# Patient Record
Sex: FEMALE | Race: WHITE | Employment: OTHER | URBAN - METROPOLITAN AREA
[De-identification: names, ages, dates, MRNs, and addresses within clinical notes are randomized per-mention and may not be internally consistent; named-entity substitution may affect disease eponyms.]

---

## 2017-01-24 ENCOUNTER — OFFICE VISIT (OUTPATIENT)
Dept: FAMILY MEDICINE CLINIC | Age: 47
End: 2017-01-24

## 2017-01-24 VITALS
HEART RATE: 63 BPM | BODY MASS INDEX: 24.76 KG/M2 | SYSTOLIC BLOOD PRESSURE: 134 MMHG | DIASTOLIC BLOOD PRESSURE: 81 MMHG | WEIGHT: 145.38 LBS | OXYGEN SATURATION: 100 %

## 2017-01-24 DIAGNOSIS — G43.109 MIGRAINE WITH AURA AND WITHOUT STATUS MIGRAINOSUS, NOT INTRACTABLE: Primary | ICD-10-CM

## 2017-01-24 DIAGNOSIS — R07.81 RIB PAIN: ICD-10-CM

## 2017-01-24 DIAGNOSIS — M54.12 CERVICAL RADICULOPATHY, ACUTE: ICD-10-CM

## 2017-01-24 PROCEDURE — G8420 CALC BMI NORM PARAMETERS: HCPCS | Performed by: FAMILY MEDICINE

## 2017-01-24 PROCEDURE — G8484 FLU IMMUNIZE NO ADMIN: HCPCS | Performed by: FAMILY MEDICINE

## 2017-01-24 PROCEDURE — 99214 OFFICE O/P EST MOD 30 MIN: CPT | Performed by: FAMILY MEDICINE

## 2017-01-24 PROCEDURE — G8427 DOCREV CUR MEDS BY ELIG CLIN: HCPCS | Performed by: FAMILY MEDICINE

## 2017-01-24 PROCEDURE — 1036F TOBACCO NON-USER: CPT | Performed by: FAMILY MEDICINE

## 2017-01-24 RX ORDER — OXYCODONE HYDROCHLORIDE AND ACETAMINOPHEN 5; 325 MG/1; MG/1
1 TABLET ORAL EVERY 4 HOURS PRN
COMMUNITY
End: 2017-01-24 | Stop reason: CLARIF

## 2017-01-24 RX ORDER — OXYCODONE AND ACETAMINOPHEN 7.5; 325 MG/1; MG/1
1 TABLET ORAL EVERY 4 HOURS PRN
COMMUNITY
End: 2018-01-10

## 2017-01-24 RX ORDER — FLUCONAZOLE 150 MG/1
150 TABLET ORAL ONCE
Qty: 1 TABLET | Refills: 1 | Status: SHIPPED | OUTPATIENT
Start: 2017-01-24 | End: 2017-01-24

## 2017-01-24 RX ORDER — PREDNISONE 10 MG/1
TABLET ORAL
Qty: 32 TABLET | Refills: 0 | Status: SHIPPED | OUTPATIENT
Start: 2017-01-24 | End: 2017-02-03

## 2017-01-24 RX ORDER — GABAPENTIN 600 MG/1
600 TABLET ORAL 3 TIMES DAILY
Qty: 90 TABLET | Refills: 5 | Status: SHIPPED | OUTPATIENT
Start: 2017-01-24 | End: 2017-07-24 | Stop reason: ALTCHOICE

## 2017-01-24 RX ORDER — RIZATRIPTAN BENZOATE 10 MG/1
TABLET ORAL
Qty: 12 TABLET | Refills: 3 | Status: SHIPPED | OUTPATIENT
Start: 2017-01-24 | End: 2017-05-31 | Stop reason: SDUPTHER

## 2017-02-02 RX ORDER — NICOTINE 21 MG/24HR
PATCH, TRANSDERMAL 24 HOURS TRANSDERMAL
Qty: 30 PATCH | Refills: 0 | Status: SHIPPED | OUTPATIENT
Start: 2017-02-02 | End: 2017-07-24 | Stop reason: ALTCHOICE

## 2017-02-03 ENCOUNTER — TELEPHONE (OUTPATIENT)
Dept: FAMILY MEDICINE CLINIC | Age: 47
End: 2017-02-03

## 2017-02-03 RX ORDER — CYCLOBENZAPRINE HCL 10 MG
TABLET ORAL
Qty: 30 TABLET | Refills: 0 | Status: SHIPPED | OUTPATIENT
Start: 2017-02-03 | End: 2017-07-24 | Stop reason: ALTCHOICE

## 2017-02-09 ENCOUNTER — TELEPHONE (OUTPATIENT)
Dept: FAMILY MEDICINE CLINIC | Age: 47
End: 2017-02-09

## 2017-02-09 DIAGNOSIS — M54.12 CERVICAL RADICULOPATHY, ACUTE: Primary | ICD-10-CM

## 2017-02-19 DIAGNOSIS — G43.109 MIGRAINE WITH AURA AND WITHOUT STATUS MIGRAINOSUS, NOT INTRACTABLE: ICD-10-CM

## 2017-02-20 ENCOUNTER — OFFICE VISIT (OUTPATIENT)
Dept: ORTHOPEDIC SURGERY | Age: 47
End: 2017-02-20

## 2017-02-20 VITALS
HEART RATE: 62 BPM | DIASTOLIC BLOOD PRESSURE: 77 MMHG | HEIGHT: 64 IN | SYSTOLIC BLOOD PRESSURE: 122 MMHG | BODY MASS INDEX: 24.8 KG/M2 | WEIGHT: 145.28 LBS

## 2017-02-20 DIAGNOSIS — M54.2 NECK PAIN: Primary | ICD-10-CM

## 2017-02-20 PROCEDURE — G8484 FLU IMMUNIZE NO ADMIN: HCPCS | Performed by: PHYSICIAN ASSISTANT

## 2017-02-20 PROCEDURE — 99214 OFFICE O/P EST MOD 30 MIN: CPT | Performed by: PHYSICIAN ASSISTANT

## 2017-02-20 PROCEDURE — 72050 X-RAY EXAM NECK SPINE 4/5VWS: CPT | Performed by: PHYSICIAN ASSISTANT

## 2017-02-20 PROCEDURE — G8427 DOCREV CUR MEDS BY ELIG CLIN: HCPCS | Performed by: PHYSICIAN ASSISTANT

## 2017-02-20 PROCEDURE — G8420 CALC BMI NORM PARAMETERS: HCPCS | Performed by: PHYSICIAN ASSISTANT

## 2017-02-20 PROCEDURE — 1036F TOBACCO NON-USER: CPT | Performed by: PHYSICIAN ASSISTANT

## 2017-02-20 RX ORDER — PROPRANOLOL HCL 60 MG
CAPSULE, EXTENDED RELEASE 24HR ORAL
Qty: 30 CAPSULE | Refills: 5 | Status: SHIPPED | OUTPATIENT
Start: 2017-02-20 | End: 2017-08-21 | Stop reason: SDUPTHER

## 2017-02-20 RX ORDER — PREGABALIN 75 MG/1
CAPSULE ORAL
Qty: 28 CAPSULE | Refills: 0 | Status: SHIPPED | OUTPATIENT
Start: 2017-02-20 | End: 2017-07-24 | Stop reason: SDUPTHER

## 2017-02-20 RX ORDER — PREGABALIN 75 MG/1
CAPSULE ORAL
Qty: 60 CAPSULE | Refills: 2 | Status: SHIPPED | OUTPATIENT
Start: 2017-02-20 | End: 2018-02-05 | Stop reason: SDUPTHER

## 2017-04-10 RX ORDER — ELETRIPTAN HYDROBROMIDE 40 MG/1
TABLET, FILM COATED ORAL
Qty: 6 TABLET | Refills: 5 | Status: SHIPPED | OUTPATIENT
Start: 2017-04-10 | End: 2018-01-24 | Stop reason: SDUPTHER

## 2017-05-31 DIAGNOSIS — G43.109 MIGRAINE WITH AURA AND WITHOUT STATUS MIGRAINOSUS, NOT INTRACTABLE: ICD-10-CM

## 2017-06-01 RX ORDER — RIZATRIPTAN BENZOATE 10 MG/1
TABLET ORAL
Qty: 12 TABLET | Refills: 3 | Status: SHIPPED | OUTPATIENT
Start: 2017-06-01 | End: 2017-09-30 | Stop reason: SDUPTHER

## 2017-06-20 ENCOUNTER — TELEPHONE (OUTPATIENT)
Dept: FAMILY MEDICINE CLINIC | Age: 47
End: 2017-06-20

## 2017-07-24 ENCOUNTER — OFFICE VISIT (OUTPATIENT)
Dept: FAMILY MEDICINE CLINIC | Age: 47
End: 2017-07-24

## 2017-07-24 VITALS
OXYGEN SATURATION: 98 % | WEIGHT: 151.25 LBS | BODY MASS INDEX: 25.82 KG/M2 | SYSTOLIC BLOOD PRESSURE: 124 MMHG | DIASTOLIC BLOOD PRESSURE: 70 MMHG | HEART RATE: 52 BPM

## 2017-07-24 DIAGNOSIS — M54.2 CHRONIC NECK PAIN: ICD-10-CM

## 2017-07-24 DIAGNOSIS — G89.29 CHRONIC NECK PAIN: ICD-10-CM

## 2017-07-24 DIAGNOSIS — N95.1 PERI-MENOPAUSE: ICD-10-CM

## 2017-07-24 DIAGNOSIS — G43.109 MIGRAINE WITH AURA AND WITHOUT STATUS MIGRAINOSUS, NOT INTRACTABLE: Primary | ICD-10-CM

## 2017-07-24 PROCEDURE — G8427 DOCREV CUR MEDS BY ELIG CLIN: HCPCS | Performed by: FAMILY MEDICINE

## 2017-07-24 PROCEDURE — 99214 OFFICE O/P EST MOD 30 MIN: CPT | Performed by: FAMILY MEDICINE

## 2017-07-24 PROCEDURE — 1036F TOBACCO NON-USER: CPT | Performed by: FAMILY MEDICINE

## 2017-07-24 PROCEDURE — G8419 CALC BMI OUT NRM PARAM NOF/U: HCPCS | Performed by: FAMILY MEDICINE

## 2017-07-24 RX ORDER — TOPIRAMATE 25 MG/1
25 TABLET ORAL 2 TIMES DAILY
COMMUNITY
End: 2018-01-12 | Stop reason: ALTCHOICE

## 2017-07-24 RX ORDER — ASPIRIN 325 MG
325 TABLET ORAL DAILY
COMMUNITY
End: 2018-06-03

## 2017-07-24 ASSESSMENT — PATIENT HEALTH QUESTIONNAIRE - PHQ9
1. LITTLE INTEREST OR PLEASURE IN DOING THINGS: 0
SUM OF ALL RESPONSES TO PHQ QUESTIONS 1-9: 0
2. FEELING DOWN, DEPRESSED OR HOPELESS: 0
SUM OF ALL RESPONSES TO PHQ9 QUESTIONS 1 & 2: 0

## 2017-08-21 DIAGNOSIS — G43.109 MIGRAINE WITH AURA AND WITHOUT STATUS MIGRAINOSUS, NOT INTRACTABLE: ICD-10-CM

## 2017-08-21 RX ORDER — PROPRANOLOL HCL 60 MG
CAPSULE, EXTENDED RELEASE 24HR ORAL
Qty: 30 CAPSULE | Refills: 5 | Status: SHIPPED | OUTPATIENT
Start: 2017-08-21 | End: 2018-01-12 | Stop reason: ALTCHOICE

## 2017-09-30 DIAGNOSIS — G43.109 MIGRAINE WITH AURA AND WITHOUT STATUS MIGRAINOSUS, NOT INTRACTABLE: ICD-10-CM

## 2017-10-02 RX ORDER — RIZATRIPTAN BENZOATE 10 MG/1
TABLET ORAL
Qty: 12 TABLET | Refills: 3 | Status: SHIPPED | OUTPATIENT
Start: 2017-10-02 | End: 2018-01-12 | Stop reason: ALTCHOICE

## 2017-11-20 ENCOUNTER — OFFICE VISIT (OUTPATIENT)
Dept: FAMILY MEDICINE CLINIC | Age: 47
End: 2017-11-20

## 2017-11-20 VITALS
OXYGEN SATURATION: 98 % | DIASTOLIC BLOOD PRESSURE: 84 MMHG | SYSTOLIC BLOOD PRESSURE: 150 MMHG | BODY MASS INDEX: 25.1 KG/M2 | HEART RATE: 63 BPM | WEIGHT: 147 LBS

## 2017-11-20 DIAGNOSIS — R10.11 RIGHT UPPER QUADRANT ABDOMINAL PAIN: Primary | ICD-10-CM

## 2017-11-20 LAB
ALBUMIN SERPL-MCNC: 4.7 G/DL (ref 3.4–5)
ALP BLD-CCNC: 73 U/L (ref 40–129)
ALT SERPL-CCNC: 13 U/L (ref 10–40)
AMYLASE: 69 U/L (ref 25–115)
ANION GAP SERPL CALCULATED.3IONS-SCNC: 16 MMOL/L (ref 3–16)
AST SERPL-CCNC: 16 U/L (ref 15–37)
BASOPHILS ABSOLUTE: 0.1 K/UL (ref 0–0.2)
BASOPHILS RELATIVE PERCENT: 1.2 %
BILIRUB SERPL-MCNC: <0.2 MG/DL (ref 0–1)
BILIRUBIN DIRECT: <0.2 MG/DL (ref 0–0.3)
BILIRUBIN, INDIRECT: NORMAL MG/DL (ref 0–1)
BILIRUBIN, POC: NORMAL
BLOOD URINE, POC: NORMAL
BUN BLDV-MCNC: 10 MG/DL (ref 7–20)
CALCIUM SERPL-MCNC: 9.7 MG/DL (ref 8.3–10.6)
CHLORIDE BLD-SCNC: 102 MMOL/L (ref 99–110)
CLARITY, POC: CLEAR
CO2: 23 MMOL/L (ref 21–32)
COLOR, POC: YELLOW
CREAT SERPL-MCNC: 0.8 MG/DL (ref 0.6–1.1)
EOSINOPHILS ABSOLUTE: 0.2 K/UL (ref 0–0.6)
EOSINOPHILS RELATIVE PERCENT: 4 %
GFR AFRICAN AMERICAN: >60
GFR NON-AFRICAN AMERICAN: >60
GLUCOSE BLD-MCNC: 101 MG/DL (ref 70–99)
GLUCOSE URINE, POC: NORMAL
HCT VFR BLD CALC: 39 % (ref 36–48)
HEMOGLOBIN: 13.2 G/DL (ref 12–16)
KETONES, POC: NORMAL
LEUKOCYTE EST, POC: NORMAL
LYMPHOCYTES ABSOLUTE: 2.2 K/UL (ref 1–5.1)
LYMPHOCYTES RELATIVE PERCENT: 48.3 %
MCH RBC QN AUTO: 32.6 PG (ref 26–34)
MCHC RBC AUTO-ENTMCNC: 33.8 G/DL (ref 31–36)
MCV RBC AUTO: 96.6 FL (ref 80–100)
MONOCYTES ABSOLUTE: 0.2 K/UL (ref 0–1.3)
MONOCYTES RELATIVE PERCENT: 4.9 %
NEUTROPHILS ABSOLUTE: 1.9 K/UL (ref 1.7–7.7)
NEUTROPHILS RELATIVE PERCENT: 41.6 %
NITRITE, POC: NORMAL
PDW BLD-RTO: 13.6 % (ref 12.4–15.4)
PH, POC: 7.5
PLATELET # BLD: 246 K/UL (ref 135–450)
PMV BLD AUTO: 8.5 FL (ref 5–10.5)
POTASSIUM SERPL-SCNC: 4 MMOL/L (ref 3.5–5.1)
PROTEIN, POC: NORMAL
RBC # BLD: 4.03 M/UL (ref 4–5.2)
SODIUM BLD-SCNC: 141 MMOL/L (ref 136–145)
SPECIFIC GRAVITY, POC: 1.01
TOTAL PROTEIN: 7.1 G/DL (ref 6.4–8.2)
UROBILINOGEN, POC: 0.2
WBC # BLD: 4.5 K/UL (ref 4–11)

## 2017-11-20 PROCEDURE — 81003 URINALYSIS AUTO W/O SCOPE: CPT | Performed by: FAMILY MEDICINE

## 2017-11-20 PROCEDURE — 1036F TOBACCO NON-USER: CPT | Performed by: FAMILY MEDICINE

## 2017-11-20 PROCEDURE — G8482 FLU IMMUNIZE ORDER/ADMIN: HCPCS | Performed by: FAMILY MEDICINE

## 2017-11-20 PROCEDURE — G8419 CALC BMI OUT NRM PARAM NOF/U: HCPCS | Performed by: FAMILY MEDICINE

## 2017-11-20 PROCEDURE — 99214 OFFICE O/P EST MOD 30 MIN: CPT | Performed by: FAMILY MEDICINE

## 2017-11-20 PROCEDURE — G8427 DOCREV CUR MEDS BY ELIG CLIN: HCPCS | Performed by: FAMILY MEDICINE

## 2017-11-20 PROCEDURE — 36415 COLL VENOUS BLD VENIPUNCTURE: CPT | Performed by: FAMILY MEDICINE

## 2017-11-20 RX ORDER — ZONISAMIDE 100 MG/1
CAPSULE ORAL
Refills: 2 | COMMUNITY
Start: 2017-11-10 | End: 2018-01-12 | Stop reason: ALTCHOICE

## 2017-11-20 RX ORDER — ACETAMINOPHEN 500 MG
500 TABLET ORAL EVERY 6 HOURS PRN
COMMUNITY
End: 2018-01-10

## 2017-11-20 RX ORDER — MEMANTINE HYDROCHLORIDE 5 MG/1
5 TABLET ORAL DAILY
COMMUNITY
End: 2018-01-12 | Stop reason: ALTCHOICE

## 2017-11-20 NOTE — PROGRESS NOTES
Subjective:   She presents for pain on and off in her right side right upper quadrant since September he states one time it seemed to be worse after eating but other times it is not related to eating she has had one episode of vomiting no nausea she states sometimes she is constipated and wonders if it could be from that. No fevers review systems negative for dysuria or hematuria negative for back pain with it positive for constipation        She is allergic to ibuprofen and vicodin [hydrocodone-acetaminophen]. Objective:   BP (!) 150/84 (Site: Left Arm, Position: Sitting, Cuff Size: Large Adult)   Pulse 63   Wt 147 lb (66.7 kg)   SpO2 98%   BMI 25.10 kg/m²   No results found for this visit on 11/20/17. Exam:  Constitutional:  Well developed, well nourished, no acute distress, non-toxic appearance    HENT:  Atraumatic,oropharynx moist,  Neck-  no tenderness, supple   Respiratory:  No respiratory distress, normal breath sounds, no rales, no wheezing   Cardiovascular:  Normal rate, normal rhythm, no murmurs, no gallops, no rubs   GI:  Soft, nondistended, very mild right upper quadrant right flank tenderness to palpation but no guarding or peritoneal signs, no organomegaly, no mass, no rebound, no guarding   Musculoskeletal:  No edema  Integument:  Well hydrated, no rash   Neurologic:  Alert & oriented x 3, no focal deficits noted   Psychiatric:  Speech and behavior appropriate, normal affect and mood   Assessment and Plan:  1. Right upper quadrant abdominal pain  POCT Urinalysis No Micro (Auto)    CBC Auto Differential    Basic Metabolic Panel    Hepatic Function Panel    Amylase    XR Abdomen Kub 1 VW     Urine dip in the office today is negative. Differential diagnosis includes constipation right colon and gallbladder disease and/or duodenal ulcer. Call or return to office prn if these symptoms worsen or fail to improve as anticipated. Avoid tobacco products exposure.  The Healthy Family Handout was given to the patient today.   Maxwell Mcghee M.D.

## 2017-11-21 DIAGNOSIS — R10.11 RIGHT UPPER QUADRANT ABDOMINAL PAIN: ICD-10-CM

## 2017-11-21 DIAGNOSIS — R10.11 RUQ PAIN: Primary | ICD-10-CM

## 2017-12-05 DIAGNOSIS — R10.11 RUQ PAIN: ICD-10-CM

## 2018-01-03 ENCOUNTER — TELEPHONE (OUTPATIENT)
Dept: FAMILY MEDICINE CLINIC | Age: 48
End: 2018-01-03

## 2018-01-03 DIAGNOSIS — K80.20 GALLSTONES: Primary | ICD-10-CM

## 2018-01-10 ENCOUNTER — INITIAL CONSULT (OUTPATIENT)
Dept: SURGERY | Age: 48
End: 2018-01-10

## 2018-01-10 VITALS
HEIGHT: 64 IN | DIASTOLIC BLOOD PRESSURE: 72 MMHG | BODY MASS INDEX: 25.44 KG/M2 | WEIGHT: 149 LBS | SYSTOLIC BLOOD PRESSURE: 110 MMHG

## 2018-01-10 DIAGNOSIS — K80.10 CHRONIC CALCULOUS CHOLECYSTITIS: Primary | ICD-10-CM

## 2018-01-10 PROCEDURE — 99202 OFFICE O/P NEW SF 15 MIN: CPT | Performed by: SURGERY

## 2018-01-15 ENCOUNTER — HOSPITAL ENCOUNTER (OUTPATIENT)
Dept: SURGERY | Age: 48
Discharge: HOME | End: 2018-01-15
Attending: SURGERY | Admitting: SURGERY

## 2018-01-15 VITALS
BODY MASS INDEX: 24.83 KG/M2 | DIASTOLIC BLOOD PRESSURE: 81 MMHG | WEIGHT: 149 LBS | TEMPERATURE: 97.9 F | HEIGHT: 65 IN | HEART RATE: 64 BPM | SYSTOLIC BLOOD PRESSURE: 141 MMHG | RESPIRATION RATE: 15 BRPM | OXYGEN SATURATION: 97 %

## 2018-01-15 DIAGNOSIS — K80.10 CHRONIC CALCULOUS CHOLECYSTITIS: Primary | ICD-10-CM

## 2018-01-15 LAB
ALBUMIN SERPL-MCNC: 4.5 G/DL (ref 3.4–5)
ALP BLD-CCNC: 66 U/L (ref 40–129)
ALT SERPL-CCNC: 25 U/L (ref 10–40)
AST SERPL-CCNC: 19 U/L (ref 15–37)
BILIRUB SERPL-MCNC: <0.2 MG/DL (ref 0–1)
BILIRUBIN DIRECT: <0.2 MG/DL (ref 0–0.3)
BILIRUBIN, INDIRECT: NORMAL MG/DL (ref 0–1)
PREGNANCY, URINE: NEGATIVE
TOTAL PROTEIN: 7.2 G/DL (ref 6.4–8.2)

## 2018-01-15 PROCEDURE — 47562 LAPAROSCOPIC CHOLECYSTECTOMY: CPT | Performed by: SURGERY

## 2018-01-15 RX ORDER — OXYCODONE HYDROCHLORIDE AND ACETAMINOPHEN 5; 325 MG/1; MG/1
1 TABLET ORAL PRN
Status: COMPLETED | OUTPATIENT
Start: 2018-01-15 | End: 2018-01-15

## 2018-01-15 RX ORDER — HYDRALAZINE HYDROCHLORIDE 20 MG/ML
5 INJECTION INTRAMUSCULAR; INTRAVENOUS EVERY 10 MIN PRN
Status: DISCONTINUED | OUTPATIENT
Start: 2018-01-15 | End: 2018-01-16 | Stop reason: HOSPADM

## 2018-01-15 RX ORDER — ONDANSETRON 2 MG/ML
4 INJECTION INTRAMUSCULAR; INTRAVENOUS EVERY 10 MIN PRN
Status: DISCONTINUED | OUTPATIENT
Start: 2018-01-15 | End: 2018-01-16 | Stop reason: HOSPADM

## 2018-01-15 RX ORDER — LABETALOL HYDROCHLORIDE 5 MG/ML
5 INJECTION, SOLUTION INTRAVENOUS EVERY 10 MIN PRN
Status: DISCONTINUED | OUTPATIENT
Start: 2018-01-15 | End: 2018-01-16 | Stop reason: HOSPADM

## 2018-01-15 RX ORDER — HYDROMORPHONE HCL 110MG/55ML
0.5 PATIENT CONTROLLED ANALGESIA SYRINGE INTRAVENOUS EVERY 5 MIN PRN
Status: COMPLETED | OUTPATIENT
Start: 2018-01-15 | End: 2018-01-15

## 2018-01-15 RX ORDER — HEPARIN SODIUM 5000 [USP'U]/ML
5000 INJECTION, SOLUTION INTRAVENOUS; SUBCUTANEOUS EVERY 8 HOURS SCHEDULED
Status: DISCONTINUED | OUTPATIENT
Start: 2018-01-15 | End: 2018-01-16 | Stop reason: HOSPADM

## 2018-01-15 RX ORDER — OXYCODONE HYDROCHLORIDE 5 MG/1
TABLET ORAL
Qty: 40 TABLET | Refills: 0 | Status: SHIPPED | OUTPATIENT
Start: 2018-01-15 | End: 2018-01-29

## 2018-01-15 RX ORDER — OXYCODONE HYDROCHLORIDE AND ACETAMINOPHEN 5; 325 MG/1; MG/1
2 TABLET ORAL PRN
Status: COMPLETED | OUTPATIENT
Start: 2018-01-15 | End: 2018-01-15

## 2018-01-15 RX ORDER — HYDROMORPHONE HCL 110MG/55ML
0.5 PATIENT CONTROLLED ANALGESIA SYRINGE INTRAVENOUS EVERY 5 MIN PRN
Status: DISCONTINUED | OUTPATIENT
Start: 2018-01-15 | End: 2018-01-16 | Stop reason: HOSPADM

## 2018-01-15 RX ORDER — MEPERIDINE HYDROCHLORIDE 25 MG/ML
12.5 INJECTION INTRAMUSCULAR; INTRAVENOUS; SUBCUTANEOUS EVERY 5 MIN PRN
Status: DISCONTINUED | OUTPATIENT
Start: 2018-01-15 | End: 2018-01-16 | Stop reason: HOSPADM

## 2018-01-15 RX ORDER — HYDROMORPHONE HCL 110MG/55ML
0.25 PATIENT CONTROLLED ANALGESIA SYRINGE INTRAVENOUS EVERY 5 MIN PRN
Status: DISCONTINUED | OUTPATIENT
Start: 2018-01-15 | End: 2018-01-16 | Stop reason: HOSPADM

## 2018-01-15 RX ORDER — SODIUM CHLORIDE 0.9 % (FLUSH) 0.9 %
10 SYRINGE (ML) INJECTION PRN
Status: DISCONTINUED | OUTPATIENT
Start: 2018-01-15 | End: 2018-01-16 | Stop reason: HOSPADM

## 2018-01-15 RX ORDER — SODIUM CHLORIDE, SODIUM LACTATE, POTASSIUM CHLORIDE, CALCIUM CHLORIDE 600; 310; 30; 20 MG/100ML; MG/100ML; MG/100ML; MG/100ML
INJECTION, SOLUTION INTRAVENOUS CONTINUOUS
Status: DISCONTINUED | OUTPATIENT
Start: 2018-01-15 | End: 2018-01-16 | Stop reason: HOSPADM

## 2018-01-15 RX ORDER — SODIUM CHLORIDE 0.9 % (FLUSH) 0.9 %
10 SYRINGE (ML) INJECTION EVERY 12 HOURS SCHEDULED
Status: DISCONTINUED | OUTPATIENT
Start: 2018-01-15 | End: 2018-01-16 | Stop reason: HOSPADM

## 2018-01-15 RX ADMIN — OXYCODONE HYDROCHLORIDE AND ACETAMINOPHEN 1 TABLET: 5; 325 TABLET ORAL at 14:02

## 2018-01-15 RX ADMIN — HEPARIN SODIUM 5000 UNITS: 5000 INJECTION, SOLUTION INTRAVENOUS; SUBCUTANEOUS at 10:52

## 2018-01-15 RX ADMIN — Medication 0.5 MG: at 12:51

## 2018-01-15 RX ADMIN — Medication 0.5 MG: at 12:56

## 2018-01-15 RX ADMIN — SODIUM CHLORIDE, SODIUM LACTATE, POTASSIUM CHLORIDE, CALCIUM CHLORIDE: 600; 310; 30; 20 INJECTION, SOLUTION INTRAVENOUS at 10:52

## 2018-01-15 RX ADMIN — Medication 0.5 MG: at 13:14

## 2018-01-15 RX ADMIN — SODIUM CHLORIDE, SODIUM LACTATE, POTASSIUM CHLORIDE, CALCIUM CHLORIDE: 600; 310; 30; 20 INJECTION, SOLUTION INTRAVENOUS at 12:58

## 2018-01-15 RX ADMIN — Medication 0.5 MG: at 13:24

## 2018-01-15 ASSESSMENT — PAIN DESCRIPTION - PAIN TYPE
TYPE: SURGICAL PAIN

## 2018-01-15 ASSESSMENT — PAIN SCALES - GENERAL
PAINLEVEL_OUTOF10: 8
PAINLEVEL_OUTOF10: 8
PAINLEVEL_OUTOF10: 7
PAINLEVEL_OUTOF10: 10
PAINLEVEL_OUTOF10: 10

## 2018-01-15 ASSESSMENT — PAIN DESCRIPTION - LOCATION
LOCATION: ABDOMEN

## 2018-01-15 ASSESSMENT — PAIN DESCRIPTION - DESCRIPTORS: DESCRIPTORS: CONSTANT

## 2018-01-15 ASSESSMENT — PAIN - FUNCTIONAL ASSESSMENT
PAIN_FUNCTIONAL_ASSESSMENT: 0-10
PAIN_FUNCTIONAL_ASSESSMENT: 0-10

## 2018-01-15 NOTE — PLAN OF CARE
Phase II:  1. Patient is identified using name and the date of birth. 2.  The patient is free from signs and symptoms of chemical, electrical, laser, radiation, positioning, or transfer/transport injury. 3.  The patient receives appropriate medication(s), safely administered during the Perioperative period. 4.  The patient has wound/tissue perfusion consistent with or improved from baseline levels established preoperatively. 5.  The patient is at or returning to normothermia at the conclusion of the immediate postoperative period. 6.  The patient's fluid, electrolyte, and acid base balances are consistent with or improved from baseline levels established preoperatively. 7.  The patient's pulmonary function is consistent with or improved from baseline levels established preoperatively. 8.  The patient's cardiovascular status is consistent with or improved from baseline levels established preoperatively. 9.  The patient/caregiver demonstrates knowledge of nutritional management related to the operative or other invasive procedure. 10. The patient/caregiver demonstrates knowledge of medication, pain, and wound management. 11. The patient participates in the rehabilitation process as applicable. 12.  The patient/caregiver participates in decisions affection his or her Perioperative plan of care. 13.  The patient's care is consistent with the individualized Perioperative plan of care. 14.  The patient's right to privacy is maintained. 15. The patient is the recipient of competent and ethical care within legal standards of practice. 16.  The patient's value system, lifestyle, ethnicity, and culture are considered, respected, and incorporated in the Perioperative plan of care and understands special services available. 17.  The patient demonstrates and/or reports adequate pain control throughout the the Perioperative period. 18.   The patient's neurological status is consistent with or improved from
and Phase II process. 23.  Patient pain level is established preoperatively using age appropriate pain scale. 24.  The patient will move to fall risk upon sedation- during and through the recovery phase. Interventions- orient the patient to the environment, especially the location of the bathroom; provide treaded socks/non-skid footwear; demonstrate and teach back use of the nurse's call system; instruct the patient to call for help before getting out of bed; lock all movable equipment before transferring patient; keep bed in lowest position possible.  25.  Other:

## 2018-01-15 NOTE — PROGRESS NOTES
Assessment remains unchanged from arrival. Verbal and written discharge instructions given to patient and family. .Verbalized understanding. Patient getting dressed. Assisted to bathroom. Gait steady; patient voided. States pain is easing 5-6/10 and is tolerable.

## 2018-01-17 NOTE — OP NOTE
from the  gallbladder fossa liver bed. Gallbladder was brought out through the  epigastric incision. I reinspected the right upper quadrant. I copiously  irrigated the area. I suctioned out the irrigant. There was no evident  bleeding, bile leak or complication. I deflated the abdomen and removed  the trocars. The fascia at the epigastric port site was reapproximated  with 0 Vicryl suture. Local anesthetic was infiltrated. A 4-0 Vicryl was  used to reapproximate the skin at all the incisions. Benzoin, Steri-Strip  dressing were placed. DISPOSITION:  The patient tolerated the procedure without any acute  complication. POSTOPERATIVE DIAGNOSIS:  Text.       Tereso Roberson MD    D: 01/16/2018 23:16:11       T: 01/16/2018 23:17:47     [email or web address]  Job#: [id number]     Doc#: [id number]    CC:  Chichi Carney MD

## 2018-01-24 RX ORDER — ELETRIPTAN HYDROBROMIDE 40 MG/1
TABLET, FILM COATED ORAL
Qty: 6 TABLET | Refills: 5 | Status: SHIPPED | OUTPATIENT
Start: 2018-01-24 | End: 2018-06-15 | Stop reason: SDUPTHER

## 2018-01-29 ENCOUNTER — OFFICE VISIT (OUTPATIENT)
Dept: SURGERY | Age: 48
End: 2018-01-29

## 2018-01-29 ENCOUNTER — TELEPHONE (OUTPATIENT)
Dept: FAMILY MEDICINE CLINIC | Age: 48
End: 2018-01-29

## 2018-01-29 VITALS
WEIGHT: 146 LBS | HEIGHT: 65 IN | DIASTOLIC BLOOD PRESSURE: 78 MMHG | BODY MASS INDEX: 24.32 KG/M2 | SYSTOLIC BLOOD PRESSURE: 126 MMHG

## 2018-01-29 DIAGNOSIS — Z09 SURGERY FOLLOW-UP EXAMINATION: Primary | ICD-10-CM

## 2018-01-29 PROCEDURE — 99024 POSTOP FOLLOW-UP VISIT: CPT | Performed by: SURGERY

## 2018-02-05 ENCOUNTER — OFFICE VISIT (OUTPATIENT)
Dept: FAMILY MEDICINE CLINIC | Age: 48
End: 2018-02-05

## 2018-02-05 VITALS
BODY MASS INDEX: 24.66 KG/M2 | SYSTOLIC BLOOD PRESSURE: 128 MMHG | HEIGHT: 65 IN | DIASTOLIC BLOOD PRESSURE: 82 MMHG | WEIGHT: 148 LBS | HEART RATE: 63 BPM | OXYGEN SATURATION: 99 %

## 2018-02-05 DIAGNOSIS — G43.109 MIGRAINE WITH AURA AND WITHOUT STATUS MIGRAINOSUS, NOT INTRACTABLE: ICD-10-CM

## 2018-02-05 DIAGNOSIS — M54.12 CERVICAL RADICULOPATHY, ACUTE: ICD-10-CM

## 2018-02-05 DIAGNOSIS — B35.1 ONYCHOMYCOSIS OF TOENAIL: Primary | ICD-10-CM

## 2018-02-05 PROCEDURE — 99214 OFFICE O/P EST MOD 30 MIN: CPT | Performed by: FAMILY MEDICINE

## 2018-02-05 RX ORDER — TERBINAFINE HYDROCHLORIDE 250 MG/1
250 TABLET ORAL DAILY
Qty: 30 TABLET | Refills: 2 | Status: SHIPPED | OUTPATIENT
Start: 2018-02-05 | End: 2018-03-19

## 2018-02-05 RX ORDER — ZONISAMIDE 100 MG/1
CAPSULE ORAL
COMMUNITY
Start: 2018-01-23 | End: 2018-03-13 | Stop reason: ALTCHOICE

## 2018-02-05 RX ORDER — RIZATRIPTAN BENZOATE 10 MG/1
TABLET ORAL
COMMUNITY
Start: 2018-01-23 | End: 2018-02-05

## 2018-02-05 RX ORDER — PREGABALIN 75 MG/1
CAPSULE ORAL
Qty: 60 CAPSULE | Refills: 2 | Status: SHIPPED | OUTPATIENT
Start: 2018-02-05 | End: 2018-11-10 | Stop reason: SDUPTHER

## 2018-02-05 NOTE — PATIENT INSTRUCTIONS
Please read the healthy family handout that you were given and share it with your family. Please compare this printed medication list with your medications at home to be sure they are the same. If you have any medications that are different please contact us immediately at [phone]. Also review your allergies that we have listed, these may also include medications that you have not been able to tolerate, make sure everything listed is correct. If you have any allergies that are different please contact us immediately at [phone].

## 2018-02-19 DIAGNOSIS — G43.109 MIGRAINE WITH AURA AND WITHOUT STATUS MIGRAINOSUS, NOT INTRACTABLE: ICD-10-CM

## 2018-02-19 RX ORDER — RIZATRIPTAN BENZOATE 10 MG/1
TABLET ORAL
Qty: 12 TABLET | Refills: 3 | Status: SHIPPED | OUTPATIENT
Start: 2018-02-19 | End: 2018-06-03 | Stop reason: ALTCHOICE

## 2018-03-13 ENCOUNTER — OFFICE VISIT (OUTPATIENT)
Dept: FAMILY MEDICINE CLINIC | Age: 48
End: 2018-03-13

## 2018-03-13 VITALS
WEIGHT: 152.25 LBS | TEMPERATURE: 98.3 F | BODY MASS INDEX: 25.34 KG/M2 | OXYGEN SATURATION: 99 % | DIASTOLIC BLOOD PRESSURE: 84 MMHG | SYSTOLIC BLOOD PRESSURE: 122 MMHG | HEART RATE: 76 BPM
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
## 2018-03-13 DIAGNOSIS — M25.512 ACUTE PAIN OF LEFT SHOULDER: ICD-10-CM

## 2018-03-13 DIAGNOSIS — R07.81 RIB PAIN: ICD-10-CM

## 2018-03-13 PROCEDURE — 99213 OFFICE O/P EST LOW 20 MIN: CPT | Performed by: FAMILY MEDICINE

## 2018-03-13 RX ORDER — TRAMADOL HYDROCHLORIDE 50 MG/1
50-100 TABLET ORAL EVERY 6 HOURS PRN
Qty: 30 TABLET | Refills: 0 | Status: SHIPPED | OUTPATIENT
Start: 2018-03-13 | End: 2018-03-13 | Stop reason: SINTOL

## 2018-03-13 RX ORDER — CYCLOBENZAPRINE HCL 10 MG
10 TABLET ORAL NIGHTLY PRN
Qty: 30 TABLET | Refills: 0 | Status: SHIPPED | OUTPATIENT
Start: 2018-03-13 | End: 2018-04-12

## 2018-03-13 NOTE — PATIENT INSTRUCTIONS
Please read the healthy family handout that you were given and share it with your family. Please compare this printed medication list with your medications at home to be sure they are the same. If you have any medications that are different please contact us immediately at [phone]. Also review your allergies that we have listed, these may also include medications that you have not been able to tolerate, make sure everything listed is correct. If you have any allergies that are different please contact us immediately at [phone].

## 2018-03-16 ENCOUNTER — HOSPITAL ENCOUNTER (OUTPATIENT)
Dept: OTHER | Age: 48
Discharge: HOME | End: 2018-03-16
Attending: FAMILY MEDICINE | Admitting: FAMILY MEDICINE

## 2018-03-16 DIAGNOSIS — M25.512 ACUTE PAIN OF LEFT SHOULDER: ICD-10-CM

## 2018-03-16 DIAGNOSIS — R07.81 RIB PAIN: ICD-10-CM

## 2018-05-17 DIAGNOSIS — B35.1 ONYCHOMYCOSIS OF TOENAIL: ICD-10-CM

## 2018-05-17 RX ORDER — TERBINAFINE HYDROCHLORIDE 250 MG/1
250 TABLET ORAL DAILY
Qty: 30 TABLET | Refills: 2 | OUTPATIENT
Start: 2018-05-17 | End: 2018-06-28

## 2018-06-15 RX ORDER — ELETRIPTAN HYDROBROMIDE 40 MG/1
TABLET, FILM COATED ORAL
Qty: 6 TABLET | Refills: 5 | Status: ON HOLD | OUTPATIENT
Start: 2018-06-15 | End: 2022-09-02

## 2018-07-09 DIAGNOSIS — G43.109 MIGRAINE WITH AURA AND WITHOUT STATUS MIGRAINOSUS, NOT INTRACTABLE: ICD-10-CM

## 2018-07-11 RX ORDER — RIZATRIPTAN BENZOATE 10 MG/1
TABLET ORAL
Qty: 12 TABLET | Refills: 3 | Status: SHIPPED | OUTPATIENT
Start: 2018-07-11 | End: 2018-11-11 | Stop reason: SDUPTHER

## 2018-07-11 NOTE — TELEPHONE ENCOUNTER
Patient states she is still having migraines and would like to have maxalt refill and discuss at her appt, Refilled medication per verbal order from MD.

## 2018-08-14 ENCOUNTER — TELEPHONE (OUTPATIENT)
Dept: FAMILY MEDICINE CLINIC | Age: 48
End: 2018-08-14

## 2018-08-21 ENCOUNTER — OFFICE VISIT (OUTPATIENT)
Dept: FAMILY MEDICINE CLINIC | Age: 48
End: 2018-08-21

## 2018-08-21 VITALS
HEART RATE: 72 BPM | SYSTOLIC BLOOD PRESSURE: 130 MMHG | BODY MASS INDEX: 24.83 KG/M2 | DIASTOLIC BLOOD PRESSURE: 80 MMHG | WEIGHT: 149.2 LBS | OXYGEN SATURATION: 98 %

## 2018-08-21 DIAGNOSIS — G89.29 CHRONIC BILATERAL LOW BACK PAIN WITH BILATERAL SCIATICA: Primary | ICD-10-CM

## 2018-08-21 DIAGNOSIS — M54.42 CHRONIC BILATERAL LOW BACK PAIN WITH BILATERAL SCIATICA: Primary | ICD-10-CM

## 2018-08-21 DIAGNOSIS — G43.109 MIGRAINE WITH AURA AND WITHOUT STATUS MIGRAINOSUS, NOT INTRACTABLE: ICD-10-CM

## 2018-08-21 DIAGNOSIS — M54.41 CHRONIC BILATERAL LOW BACK PAIN WITH BILATERAL SCIATICA: Primary | ICD-10-CM

## 2018-08-21 PROCEDURE — G8427 DOCREV CUR MEDS BY ELIG CLIN: HCPCS | Performed by: NURSE PRACTITIONER

## 2018-08-21 PROCEDURE — 99213 OFFICE O/P EST LOW 20 MIN: CPT | Performed by: NURSE PRACTITIONER

## 2018-08-21 PROCEDURE — G8420 CALC BMI NORM PARAMETERS: HCPCS | Performed by: NURSE PRACTITIONER

## 2018-08-21 PROCEDURE — 4004F PT TOBACCO SCREEN RCVD TLK: CPT | Performed by: NURSE PRACTITIONER

## 2018-08-21 RX ORDER — METHOCARBAMOL 500 MG/1
500 TABLET, FILM COATED ORAL 4 TIMES DAILY
Qty: 40 TABLET | Refills: 0 | Status: SHIPPED | OUTPATIENT
Start: 2018-08-21 | End: 2018-08-31

## 2018-08-21 RX ORDER — PREDNISONE 20 MG/1
40 TABLET ORAL DAILY
Qty: 10 TABLET | Refills: 0 | Status: SHIPPED | OUTPATIENT
Start: 2018-08-21 | End: 2018-08-26

## 2018-08-21 ASSESSMENT — PATIENT HEALTH QUESTIONNAIRE - PHQ9
2. FEELING DOWN, DEPRESSED OR HOPELESS: 1
1. LITTLE INTEREST OR PLEASURE IN DOING THINGS: 1
SUM OF ALL RESPONSES TO PHQ QUESTIONS 1-9: 2
SUM OF ALL RESPONSES TO PHQ9 QUESTIONS 1 & 2: 2
SUM OF ALL RESPONSES TO PHQ QUESTIONS 1-9: 2

## 2018-08-21 ASSESSMENT — ENCOUNTER SYMPTOMS
GASTROINTESTINAL NEGATIVE: 1
RESPIRATORY NEGATIVE: 1
BACK PAIN: 1

## 2018-08-21 NOTE — PROGRESS NOTES
FOR MIGRAINE MAY REPEAT IN 1-2 HOURS IF NEEDED 12 tablet 3    pregabalin (LYRICA) 75 MG capsule i po BID. 60 capsule 2       Allergies   Allergen Reactions    Ibuprofen      headache    Vicodin [Hydrocodone-Acetaminophen]     Tramadol Nausea And Vomiting       Social History   Substance Use Topics    Smoking status: Current Every Day Smoker     Packs/day: 1.00     Types: Cigarettes     Last attempt to quit: 9/30/2016    Smokeless tobacco: Never Used    Alcohol use Yes      Comment: bimonthly       Objective:   /80   Pulse 72   Wt 149 lb 3.2 oz (67.7 kg)   SpO2 98%   BMI 24.83 kg/m²     Physical Exam   Constitutional: She is oriented to person, place, and time. She appears well-developed and well-nourished. HENT:   Head: Normocephalic and atraumatic. Eyes: Pupils are equal, round, and reactive to light. Conjunctivae and EOM are normal.   Neck: Normal range of motion. Neck supple. Cardiovascular: Normal rate, regular rhythm and normal heart sounds. Pulmonary/Chest: Effort normal and breath sounds normal. No accessory muscle usage. No respiratory distress. Abdominal: Soft. Bowel sounds are normal.   Musculoskeletal: Normal range of motion. Lumbar back: She exhibits tenderness and pain. She exhibits no bony tenderness. Neurological: She is alert and oriented to person, place, and time. Skin: Skin is warm and dry. Psychiatric: She has a normal mood and affect. Her speech is normal and behavior is normal.   Vitals reviewed. Assessment/Plan:   1. Chronic bilateral low back pain with bilateral sciatica  Patient presents today with complaints of low back pain radiating down bilateral buttocks over the past several days. Patient with history of chronic back pain. Patient denies any known injury. On exam noted mild muscular tenderness - lumbar region. I suspect sciatica. Recommend treatment as below and advised on MARTIR.   Patient to follow-up if no better or worsening of

## 2018-08-21 NOTE — PATIENT INSTRUCTIONS
with a small pillow under their head and another under their knees. Some people prefer to lie on their side with a pillow between their knees. Don't stay in one position for too long. Take short walks (10 to 20 minutes) every 2 to 3 hours. Avoid slopes, hills, and stairs until you feel better. Walk only distances you can manage without pain, especially leg pain. How to do the exercises  Back stretches    1. Get down on your hands and knees on the floor. 2. Relax your head and allow it to droop. Round your back up toward the ceiling until you feel a nice stretch in your upper, middle, and lower back. Hold this stretch for as long as it feels comfortable, or about 15 to 30 seconds. 3. Return to the starting position with a flat back while you are on your hands and knees. 4. Let your back sway by pressing your stomach toward the floor. Lift your buttocks toward the ceiling. 5. Hold this position for 15 to 30 seconds. 6. Repeat 2 to 4 times. Follow-up care is a key part of your treatment and safety. Be sure to make and go to all appointments, and call your doctor if you are having problems. It's also a good idea to know your test results and keep a list of the medicines you take. Where can you learn more? Go to [email or web address]://Work[employer]V [email or web address] O. org and sign in to your [employer] account. Enter K089 in the [facility] box to learn more about \"Sciatica: Exercises. \"     If you do not have an account, please click on the \"Sign Up Now\" link. Current as of: November 29, 2017  Content Version: 11.7  © 3482-0605 [employer]. Care instructions adapted under license by [facility] ([facility]). If you have questions about a medical condition or this instruction, always ask your healthcare professional. [place] [address] any warranty or liability for your use of this information.        Patient Education        Oral Corticosteroids: Care Instructions  Your Care Instructions    Oral taking methocarbamol? You should not use methocarbamol if you are allergic to it. To make sure methocarbamol is safe for you, tell your doctor if you have:  · myasthenia gravis; or  · if you also use a narcotic (opioid) medication. Using methocarbamol during early pregnancy may cause birth defects. Tell your doctor if you are pregnant or if you become pregnant while using this medicine. It is not known whether methocarbamol passes into breast milk or if it could affect the nursing baby. Tell your doctor if you are breast-feeding. Methocarbamol is not approved for use by anyone younger than 12years old. How should I take methocarbamol? Follow all directions on your prescription label. Do not take this medicine in larger or smaller amounts or for longer than recommended. You may need to reduce your dose after the first 2 or 3 days of treatment. Carefully follow your doctor's dosing instructions. Methocarbamol is only part of a complete treatment program that may also include rest, physical therapy, or other pain relief measures. Follow your doctor's instructions very closely. This medicine can cause unusual results with certain medical tests. Tell any doctor who treats you that you are using methocarbamol. Store at room temperature away from moisture and heat. What happens if I miss a dose? Take the missed dose as soon as you remember. Skip the missed dose if it is almost time for your next scheduled dose. Do not take extra medicine to make up the missed dose. What happens if I overdose? Seek emergency medical attention or call the Poison Help line at 1-522.130.8157. Overdose symptoms may include extreme drowsiness, fainting, or seizure (convulsions). What should I avoid while taking methocarbamol? This medication may impair your thinking or reactions. Be careful if you drive or do anything that requires you to be alert. Drinking alcohol with this medicine can cause side effects.   What are the information contained herein may be time sensitive. [employer] information has been compiled for use by healthcare practitioners and consumers in the [place] and therefore [employer] does not warrant that uses outside of the [place] are appropriate, unless specifically indicated otherwise. [facility] drug information does not endorse drugs, diagnose patients or recommend therapy. [employer] drug information is an informational resource designed to assist licensed healthcare practitioners in caring for their patients and/or to serve consumers viewing this service as a supplement to, and not a substitute for, the expertise, skill, knowledge and judgment of healthcare practitioners. The absence of a warning for a given drug or drug combination in no way should be construed to indicate that the drug or drug combination is safe, effective or appropriate for any given patient. [facility] does not assume any responsibility for any aspect of healthcare administered with the aid of information [facility] provides. The information contained herein is not intended to cover all possible uses, directions, precautions, warnings, drug interactions, allergic reactions, or adverse effects. If you have questions about the drugs you are taking, check with your doctor, nurse or pharmacist.  Copyright 1758-5298 [address]. Version: 6.01. Revision date: 3/10/2017. Care instructions adapted under license by [facility] ([facility]). If you have questions about a medical condition or this instruction, always ask your healthcare professional. [place] [address] any warranty or liability for your use of this information.

## 2018-08-31 ENCOUNTER — TELEPHONE (OUTPATIENT)
Dept: FAMILY MEDICINE CLINIC | Age: 48
End: 2018-08-31

## 2018-11-10 DIAGNOSIS — M54.12 CERVICAL RADICULOPATHY, ACUTE: ICD-10-CM

## 2018-11-11 DIAGNOSIS — G43.109 MIGRAINE WITH AURA AND WITHOUT STATUS MIGRAINOSUS, NOT INTRACTABLE: ICD-10-CM

## 2018-11-12 RX ORDER — RIZATRIPTAN BENZOATE 10 MG/1
TABLET ORAL
Qty: 12 TABLET | Refills: 3 | Status: SHIPPED | OUTPATIENT
Start: 2018-11-12

## 2019-03-02 DIAGNOSIS — G43.109 MIGRAINE WITH AURA AND WITHOUT STATUS MIGRAINOSUS, NOT INTRACTABLE: ICD-10-CM

## 2019-03-04 RX ORDER — RIZATRIPTAN BENZOATE 10 MG/1
TABLET ORAL
Qty: 12 TABLET | Refills: 3 | OUTPATIENT
Start: 2019-03-04

## 2019-06-21 ENCOUNTER — HOSPITAL ENCOUNTER (EMERGENCY)
Age: 49
Discharge: HOME | End: 2019-06-21
Attending: EMERGENCY MEDICINE
Payer: MEDICARE

## 2019-06-21 ENCOUNTER — APPOINTMENT (OUTPATIENT)
Dept: GENERAL RADIOLOGY | Age: 49
End: 2019-06-21
Payer: MEDICARE

## 2019-06-21 VITALS
HEIGHT: 65 IN | SYSTOLIC BLOOD PRESSURE: 137 MMHG | HEART RATE: 66 BPM | RESPIRATION RATE: 12 BRPM | TEMPERATURE: 98.2 F | BODY MASS INDEX: 26.49 KG/M2 | DIASTOLIC BLOOD PRESSURE: 82 MMHG | WEIGHT: 159 LBS

## 2019-06-21 DIAGNOSIS — J06.9 UPPER RESPIRATORY TRACT INFECTION, UNSPECIFIED TYPE: Primary | ICD-10-CM

## 2019-06-21 PROCEDURE — 6370000000 HC RX 637 (ALT 250 FOR IP)

## 2019-06-21 PROCEDURE — 99283 EMERGENCY DEPT VISIT LOW MDM: CPT

## 2019-06-21 PROCEDURE — 6370000000 HC RX 637 (ALT 250 FOR IP): Performed by: EMERGENCY MEDICINE

## 2019-06-21 PROCEDURE — 71046 X-RAY EXAM CHEST 2 VIEWS: CPT

## 2019-06-21 RX ORDER — BENZONATATE 100 MG/1
100 CAPSULE ORAL ONCE
Status: COMPLETED | OUTPATIENT
Start: 2019-06-21 | End: 2019-06-21

## 2019-06-21 RX ORDER — PREDNISONE 20 MG/1
40 TABLET ORAL ONCE
Status: DISCONTINUED | OUTPATIENT
Start: 2019-06-21 | End: 2019-06-21 | Stop reason: HOSPADM

## 2019-06-21 RX ORDER — PREDNISONE 10 MG/1
TABLET ORAL
Status: COMPLETED
Start: 2019-06-21 | End: 2019-06-21

## 2019-06-21 RX ORDER — METHYLPREDNISOLONE 4 MG/1
TABLET ORAL
Qty: 1 KIT | Refills: 0 | Status: ON HOLD | OUTPATIENT
Start: 2019-06-21 | End: 2022-09-02

## 2019-06-21 RX ORDER — BENZONATATE 100 MG/1
100 CAPSULE ORAL 2 TIMES DAILY PRN
Qty: 14 CAPSULE | Refills: 0 | Status: SHIPPED | OUTPATIENT
Start: 2019-06-21 | End: 2019-06-28

## 2019-06-21 RX ADMIN — BENZONATATE 100 MG: 100 CAPSULE ORAL at 14:39

## 2019-06-21 RX ADMIN — PREDNISONE: 10 TABLET ORAL at 14:40

## 2019-06-21 ASSESSMENT — ENCOUNTER SYMPTOMS
SINUS PAIN: 1
SINUS PRESSURE: 1
NAUSEA: 0
COUGH: 1
SHORTNESS OF BREATH: 0
VOICE CHANGE: 0
TROUBLE SWALLOWING: 0
DIARRHEA: 0
VOMITING: 1

## 2019-06-21 ASSESSMENT — PAIN SCALES - GENERAL: PAINLEVEL_OUTOF10: 3

## 2019-06-21 NOTE — ED NOTES
Patient provided with discharge instructions and any prescriptions. Follow-up reviewed with patient. No further questions verbalized at this time. Vital signs and patient stable upon discharge.         Debi Mccoy, RN  06/21/19 3381

## 2019-06-21 NOTE — ED PROVIDER NOTES
[facility]  eMERGENCY dEPARTMENT eNCOUnter      Pt Name: Marla Norwood  MRN: [id number]  [place] 1970  Date of evaluation: 6/21/2019  Provider: Javi Blackburn MD    CHIEF COMPLAINT       Chief Complaint   Patient presents with    URI     Pt states she had bronchitis about a month ago, finished antibiotics for it about 2 weeks ago and states she feels like it came back; reports persistent cough, sts vomiting after \"coughing fit\". HISTORY OF PRESENT ILLNESS   (Location/Symptom, Timing/Onset, Context/Setting, Quality, Duration, Modifying Factors, Severity)  Note limiting factors. Marla Norwood is a 52 y.o. female who presents for 1 month of on and off bronchitis symptoms. She reports that 2 weeks ago she finished a course of amoxicillin and states she got mildly better but her symptoms never fully resolved and then now they have come back and worsened. She reports her symptoms are nonproductive cough, fatigue, nasal congestion, sinus pressure, fatigue and mellitus. The patient does report she had a coughing episode earlier today that caused posttussive emesis however she denies any nausea, abdominal pain, inability to tolerate p.o., or any vomiting not related to coughing. She reports a mild headache however states that this is \" nothing\" like her previous subarachnoid hemorrhage and denies any limitation of neck movement, syncope, or onset during exertion. She reports her symptoms have been ongoing for 1 month, are waxing and waning but currently worsening. HPI    Nursing Notes were reviewed. REVIEW OFSYSTEMS    (2-9 systems for level 4, 10 or more for level 5)     Review of Systems   Constitutional: Positive for activity change and fatigue. Negative for appetite change and fever. HENT: Positive for congestion, sinus pressure and sinus pain. Negative for trouble swallowing and voice change. Eyes: Negative for visual disturbance.    Respiratory: Positive for cough. Negative for shortness of breath. Cardiovascular: Negative for chest pain and palpitations. Gastrointestinal: Positive for vomiting (posttussive). Negative for diarrhea and nausea. Genitourinary: Negative for dysuria. Musculoskeletal: Negative for gait problem. Neurological: Positive for weakness. Negative for seizures and syncope. Psychiatric/Behavioral: Negative for self-injury and suicidal ideas. Except as noted above the remainder of the review of systems was reviewed and negative.        PAST MEDICAL HISTORY     Past Medical History:   Diagnosis Date    Alcohol abuse 2014    DDD (degenerative disc disease), lumbosacral     Depression     Gastritis     Heart murmur     Heart palpitations     Migraine     Osteoarthritis     Overactive bladder     SCIATIC NERVE DISEASE     Subarachnoid hemorrhage ([facility] [address] 2007         SURGICAL HISTORY       Past Surgical History:   Procedure Laterality Date    BRAIN SURGERY      PT STATED DRILLED HOLE TO RELIEVE BLOOD FROM HEMORRHAGE    CHOLECYSTECTOMY  01/15/2018     LAPAROSCOPIC CHOLECYSTECTOMY              ENDOMETRIAL ABLATION      TONSILLECTOMY      TUBAL LIGATION           CURRENT MEDICATIONS       Previous Medications    BUPROPION HCL (WELLBUTRIN PO)    Take by mouth Daily    ELETRIPTAN (RELPAX) 40 MG TABLET    TAKE 1 TABLET BY MOUTH AS NEEDED MAY REPEAT IN 2 HOURS IF NECESSARY    LYRICA 75 MG CAPSULE    TAKE 1 CAPSULE BY MOUTH TWICE A DAY    OMEGA-3 FATTY ACIDS (FISH OIL PO)    Take by mouth    RIZATRIPTAN (MAXALT) 10 MG TABLET    TAKE 1 TABLET BY MOUTH ONCE AS NEEDED FOR MIGRAINE MAY REPEAT IN 1-2 HOURS IF NEEDED    VITAMIN B-2 (RIBOFLAVIN) 100 MG TABS TABLET    Take 100 mg by mouth daily       ALLERGIES     Ibuprofen; Vicodin [hydrocodone-acetaminophen]; and Tramadol    FAMILY HISTORY       Family History   Problem Relation Age of Onset    Arthritis Mother     Asthma Mother     Depression Mother     Diabetes Mother  Hearing Loss Mother     Heart Disease Mother     High Blood Pressure Mother     High Cholesterol Mother     Mental Illness Mother     Stroke Mother     Vision Loss Mother     Arthritis Father     Hearing Loss Father     High Blood Pressure Father     High Cholesterol Father     Substance Abuse Father     Vision Loss Father     Arthritis Sister     Depression Sister     High Cholesterol Sister     Mental Retardation Sister     [de-identified] / [place] Sister           SOCIAL HISTORY       Social History     Socioeconomic History    Marital status:      Spouse name: None    Number of children: None    Years of education: None    Highest education level: None   Occupational History    None   Social Needs    Financial resource strain: None    Food insecurity:     Worry: None     Inability: None    Transportation needs:     Medical: None     Non-medical: None   Tobacco Use    Smoking status: Current Every Day Smoker     Packs/day: 1.00     Types: Cigarettes     Last attempt to quit: 2016     Years since quittin.7    Smokeless tobacco: Never Used   Substance and Sexual Activity    Alcohol use: Yes     Comment: Occ.     Drug use: No    Sexual activity: None   Lifestyle    Physical activity:     Days per week: None     Minutes per session: None    Stress: None   Relationships    Social connections:     Talks on phone: None     Gets together: None     Attends [employer] service: None     Active member of club or organization: None     Attends meetings of clubs or organizations: None     Relationship status: None    Intimate partner violence:     Fear of current or ex partner: None     Emotionally abused: None     Physically abused: None     Forced sexual activity: None   Other Topics Concern    None   Social History Narrative    None         PHYSICAL EXAM    (up to 7 for level 4, 8 or more for level 5)     ED Triage Vitals [19 1331]   BP Temp Temp Source Pulse Resp SpO2 Height Weight   137/82 98.2 °F (36.8 °C) Oral 66 12 -- 5' 5\" (1.651 m) 159 lb (72.1 kg)       Physical Exam   Constitutional: She is oriented to person, place, and time. She appears well-developed and well-nourished. No distress. HENT:   Head: Normocephalic and atraumatic. Right Ear: Hearing, tympanic membrane, external ear and ear canal normal.   Left Ear: Hearing, tympanic membrane, external ear and ear canal normal.   Mouth/Throat: Oropharynx is clear and moist. No oropharyngeal exudate, posterior oropharyngeal edema, posterior oropharyngeal erythema or tonsillar abscesses. Eyes: Pupils are equal, round, and reactive to light. Conjunctivae and EOM are normal.   Neck: Normal range of motion. Neck supple. No JVD present. Full range of motion of neck. Cardiovascular: Normal rate and intact distal pulses. Pulmonary/Chest: Effort normal and breath sounds normal. No stridor. No respiratory distress. She has no wheezes. Abdominal: There is no tenderness. There is no rebound. Musculoskeletal: She exhibits no deformity. Neurological: She is alert and oriented to person, place, and time. 5/5 strength in all 4 extremities. Normal gait. DIAGNOSTIC RESULTS       RADIOLOGY:     Interpretation per the Radiologist below, if available at the time of this note:    XR CHEST STANDARD (2 VW)   Final Result   No acute cardiopulmonary disease. EMERGENCY DEPARTMENT COURSE and DIFFERENTIAL DIAGNOSIS/MDM:   Vitals:    Vitals:    06/21/19 1331   BP: 137/82   Pulse: 66   Resp: 12   Temp: 98.2 °F (36.8 °C)   TempSrc: Oral   Weight: 159 lb (72.1 kg)   Height: 5' 5\" (1.651 m)         MDM  Patient is afebrile, nontoxic-appearing, and in no acute distress. Chest x-ray does not show any cardiopulmonary pathology such as pneumonia or focal airspace disease.   I suspect likely bronchitis and based on the fact the patient has already been placed on amoxicillin without full resolution of symptoms Cough    METHYLPREDNISOLONE (MEDROL, REX,) 4 MG TABLET    Take by mouth. NYSTATIN (MYCOSTATIN) 448152 UNIT/ML SUSPENSION    Take 5 mLs by mouth 4 times daily for 14 days          (Please note that portions of this note were completed with a voice recognition program.  Efforts were made to edit the dictations but occasionally words aremis-transcribed. )    Nikhil Valdivia MD (electronically signed)  Attending Emergency Physician           Nikhil Valdivia MD  06/21/19 7010

## 2020-02-12 ENCOUNTER — TELEPHONE (OUTPATIENT)
Dept: FAMILY MEDICINE CLINIC | Age: 50
End: 2020-02-12

## 2020-02-12 NOTE — TELEPHONE ENCOUNTER
Pt new to practice and has appointment for March 6 with Dr Domingo Devine. Pt would like to know with being pregnant and 48years old if she should continue taking her medications.  She has had difficult pregnancies in the past  Lyrica  Dicyclomine  Rizatriptan  Percoset 5mg  Mirtazapine

## 2020-02-14 NOTE — TELEPHONE ENCOUNTER
It's ok to give her the message, but if she's not establishing here any future questions need to go to her OB

## 2020-02-14 NOTE — TELEPHONE ENCOUNTER
Pt canceled appt with Jose J Schofield and did not reschedule for a future date. Do I still contact the pt with this information. We've never seen her.

## 2020-08-31 ENCOUNTER — HOSPITAL ENCOUNTER (OUTPATIENT)
Dept: NEUROLOGY | Age: 50
Discharge: HOME | End: 2020-08-31
Payer: MEDICARE

## 2020-08-31 PROCEDURE — 95909 NRV CNDJ TST 5-6 STUDIES: CPT

## 2020-08-31 PROCEDURE — 95886 MUSC TEST DONE W/N TEST COMP: CPT

## 2020-08-31 NOTE — PROCEDURES
Electrodiagnostic Report  [facility]  Physical Medicine & Rehabilitation    08/31/20    Pierce Mcneill  48 y.o.  1970  [id number]  Referring Provider: Carlos Terrell MD    Clinical Problem:  48 y. o with history of difficulties with foot pain after sitting for prolonged time. Onset months ago. PMH: + back issues, no spine surgery no endocrine disease. + fibromyalgia. + spinal injections 3 + yrs ago in back 1 yr ago c spine.  PE: reflexes trace, normal strength    EMG SUMMARY TABLE RIGHT/LEFT LOWER       Spontaneous     MUAP   Recruitment    Insertional Activity Fibrillation Potential Positive Sharp Waves   Fasiculation High Frequency Potentials   Amp Duration PPP Pattern   Vastus Medialis L2-4 Femoral normal none none none none normal normal normal normal   Anterior Tibialis L4,5 Deep Peroneal normal none none none none normal normal normal normal   Gluteus Medius L4-S1 Superior Gut normal none none none none normal normal normal normal   Peroneus Longus L5-S1 Sup Peroneal normal none none none none normal normal normal normal   Posterior Tibialis L5-S1 Tibial normal none none none none normal normal normal normal   Medial Gastroc S1,2 Tibial normal none none none none normal normal normal normal   Extensor Hallicis I4-W0 Peroneal normal none none none none normal normal normal normal   Extensor Dig Brevis L5-S1 Peroneal normal none none none none normal normal normal normal   LS Paraspinals Posterior Rami       normal none none none none normal normal normal normal       Nerve Conduction Studies     Nerve Sensory Distal Latency (msec) Sensory Distal Latency (msec) Amp uv Amp uv Motor Distal Latency (msec) Motor Distal Latency (msec) Amp uv Amp uv Motor NCV (m/sec) Motor NCV (m/sec)    Right Left Right Left Right Left Right Left Right Left   Sural 4.0 (n<4.2) 4.0 (n<4.2) 10 8         Peroneal     4.5 (n<5.5) 5.2 (n<5.5) 3.5  2.0  45 (n>40) 45 (n>40)   Above Knee         (n>40) (n>40)   Tibial Abs  Abs    4.6 (n<6.2) 5.1 (n<6.2) 5.8 8.6 (n>40) (n>40)   H-Reflex                 Summary of EMG and Nerve Conduction Findings: The above EMG needle exam was within normal limits. Nerve conduction studies demonstrate absent medial plantar sensory evoked responses. Remainder within normal limits. Overall Impression: essentially normal study absent medial plantar responses of uncertain significance may be secondary to technical factors, can not exclude mild tarsal tunnel syndrome. Thank you. Electronically signed by:  Yazan Lang DO,8/31/2020,9:02 AM

## 2021-03-24 ENCOUNTER — HOSPITAL ENCOUNTER (OUTPATIENT)
Dept: NON INVASIVE DIAGNOSTICS | Age: 51
Discharge: HOME | End: 2021-03-24
Payer: MEDICARE

## 2021-03-24 DIAGNOSIS — R00.2 PALPITATIONS: ICD-10-CM

## 2021-03-24 PROCEDURE — 93226 XTRNL ECG REC<48 HR SCAN A/R: CPT

## 2021-03-24 PROCEDURE — 93225 XTRNL ECG REC<48 HRS REC: CPT

## 2021-03-24 NOTE — PROGRESS NOTES
Holter placed on patient and patient understands to return monitor in 48 hours. Patient denies questions.

## 2021-04-01 LAB
ACQUISITION DURATION: NORMAL S
AVERAGE HEART RATE: 74 BPM
EKG DIAGNOSIS: NORMAL
HOLTER MAX HEART RATE: 109 BPM
HOOKUP DATE: NORMAL
HOOKUP TIME: NORMAL
MAX HEART RATE TIME/DATE: NORMAL
MIN HEART RATE TIME/DATE: NORMAL
MIN HEART RATE: 54 BPM
NUMBER OF QRS COMPLEXES: NORMAL
NUMBER OF SUPRAVENTRICULAR COUPLETS: 0
NUMBER OF SUPRAVENTRICULAR ECTOPICS: 0
NUMBER OF SUPRAVENTRICULAR ISOLATED BEATS: 0
NUMBER OF VENTRICULAR BIGEMINAL CYCLES: 0
NUMBER OF VENTRICULAR COUPLETS: 0
NUMBER OF VENTRICULAR ECTOPICS: 0

## 2021-04-15 ENCOUNTER — HOSPITAL ENCOUNTER (OUTPATIENT)
Dept: PHYSICAL THERAPY | Age: 51
Setting detail: THERAPIES SERIES
Discharge: HOME | End: 2021-04-15
Payer: MEDICARE

## 2021-04-15 PROCEDURE — 97140 MANUAL THERAPY 1/> REGIONS: CPT

## 2021-04-15 PROCEDURE — 97110 THERAPEUTIC EXERCISES: CPT

## 2021-04-15 PROCEDURE — 97162 PT EVAL MOD COMPLEX 30 MIN: CPT

## 2021-04-15 NOTE — PROGRESS NOTES
[facility], [facility], [address]  Phone: [phone]                                                    Physical Therapy Certification    We had the pleasure of evaluating the following patient for physical therapy services at [address] [place] [address]. A summary of our findings can be found in the initial assessment below. This includes our plan of care. If you have any questions or concerns regarding these findings, please do not hesitate to contact me at the office phone number checked above. Thank you for the referral.       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   [] Yes, Patient intake triggered further evaluation      [] C-SSRS Screening completed  [] PCP notified via Plan of Care  [] Emergency services notified     Pt Occupation/Job Duties:  Disability- enjoys fishing, berry picking, hiking, gardening    Social support/Environment:     Family/caregiver support:   [x]Yes, lives with  and her parents who have dementia and other medical issues.    []No    Health History reviewed with pt:   []Yes   []No      PMH:   subarachnoid brain hemorrhage with surgical decompression, short term memory deficits, depression, anxiety, Lumbar DDD, OA, alcohol abuse 2014, heart murmur, heart palpitations (f/u with MD tomorrow to address heart monitor findings, migraines (received botox injections), overactive bladder, IBS, sciatic nerve disease,  Choely, endometrial ablation, tonsillectomy, tubal ligation, smokes 1-1.5 ppd. SUBJECTIVE FINDINGS        History of Present Illness:   Patient reports insidious onset of L shoulder pain that started 2 months ago. She woke up one day and had pain in her L shoulder and scapular region. Her pain has gotten progressively worse since then. She followed up her PCP who referred her for PT treatment. She is R hand dominant. Has a history of neck pain with L UE radiculopathy. Had an Cervical MRI done in 2016 with results as follows:  Impression   IMPRESSION:   Disc and osteophytes result in narrowing of the left neural foramen at C3-4   and bilateral narrowing of the neural foramina at C6-7 as discussed above. No stenosis of the thecal sac in the cervical region. She had epidural injections done to her neck and low back in the past.  No recent imaging done. Pain       Patient describes pain to be intermittent pain in L shoulder and scapular region, occasionally radiating into L neck and upper arm region   Patient reports pain is  0/10 pain at present and  7/10 pain at its worst.  Worsened by:  Pain increases with any activity, reaching, lifting    Improved by:  Keeping arm at side       Current Functional Limitations:   [x]Yes   []No  Functional Complaints: limited with most activity using L UE, self-care, household chores     PLOF:   [x]No functional limitations   []Pt with previous functional limitations  Pt's sleep is affected?    [x]Yes, not sleeping well due to pain   []No         Patient goal for therapy:  \"get back to normal\"      OBJECTIVE FINDINGS    Posture:   [x]Forward head  [x]Forward flexed trunk   []Pronounced CT junction    [x]Increased thoracic kyphosis   []Increased lumbar lordosis   []Scoliosis   []Swayback  []Decreased WB on   []R   []L   []Scapular winging  []Other:       Range of Motion   [x]Cervical Spine   []Thoracic Spine     Range Tested AROM PROM MMT/Resisted Tests   Flexion 56  5/5   Extension 60  5/5   Sidebending Left 34  5/5   Sidebending Right 30*  5/5   Rotation Left 70  5/5   Rotation Right 77*  5/5   Comments:    UE Range of Motion/Strength Testing     [x]All ROM WNL except as marked below   [x]All strength WNL (5/5) except as marked below (measured out of 5)     Range Tested AROM PROM Resisted Comments   *denotes pain Left Right Left Right Left Right    Shoulder Flexion 115* 180 180  2/5 4/5    Shoulder Extension 60 70   4+/5 5/5    Shoulder Abduction 100* 180 180  2/5 4/5    Shoulder Adduction           Shoulder IR 90 90   4-/5 4+/5    Shoulder ER 80 100   4-/5 4+/5    Elbow Flexion WNL WNL   4-/5 4/5    Elbow Extension  \" \"   4-/5 5/5    Pronation \" \"   5/5 5/5    Supination \" \"   5/5 5/5    Wrist Flexion \" \"   5/5 5/5    Wrist Extension \" \"   5/5 5/5    Radial Deviation \" \"   5/5 5/5    Ulnar Deviation \" \"   5/5 5/5                           Scapular Strength    []All WNL (5/5) except as marked below (measured out of 5)  [x]NT     Reflexes/Cervical Strength    [x]All reflexes WNL (2+) or negative test except as marked below   [x]All strength WNL (5/5) except as marked below     Reflex Left Right Strength Strength   Biceps (C5,6) 1 1 Anterior cervical flexors    Brachioradialis (C6) 2 2 Lateral musculature    Triceps (C7) 2 2     Abductor Digiti Minimi (C8,T1)       Quadriceps (L3,4)       Achilles (S1,2)       Ankle clonus       Meagan's reflex        Babinski's reflex           Dermatomes/Myotomes     [x]All dermatomes WNL for light touch     Flexibility     Muscle Findings   Pectoralis Minor []WNL   []Decreased R   []Decreased L   []NT   Levator Scapula []WNL   [x]Decreased R   [x]Decreased L []NT   Scalenes []WNL   []Decreased R   []Decreased L   []NT   Upper Trapezius  []WNL   [x]Decreased R   [x]Decreased L   []NT   Suboccipitals  []WNL   []Decreased R   []Decreased L   []NT   Other:  []WNL   []Decreased R   []Decreased L   []NT     Special Tests- UE seated     Special Test Findings   Empty can test/supraspinatus []Neg   []Pos R   [x]Pos L   []NT   Drop arm test [x]Neg   []Pos R   []Pos L   []NT   Neer's impingement []Neg   []Pos R   [x]Pos L   []NT   Storm-Cooper []Neg   []Pos R   [x]Pos L   []NT   Elbow varus stress []Neg   []Pos R   []Pos L   []NT   Elbow valgus stress  []Neg   []Pos R   []Pos L   []NT   Tinel's sign  []Neg   []Pos R   []Pos L   []NT  []Elbow   []Wrist    Finkelstein's test []Neg   []Pos R   []Pos L   []NT   Phalen's test  []Neg   []Pos R   []Pos L   []NT   Other []Neg   []Pos R   []Pos L   []NT   Other  []Neg   []Pos R   []Pos L   []NT     Palpation     Patient reported tenderness with palpation:  [x]Yes   []No   []NA  Location:  L T1-5, L T4 rib, infraspinatus, supraspinatus, and proximal biceps tendon  PT notes warmth:  []Yes   [x]No   []NA  Location:   PT notes increased muscle tone:   [x]Yes   []No   []NA  Location:  L UT, levator scap and thoracic paraspinals  PT notes crepitus with palpation:   []Yes   [x]No   []NA  Location:   Ligament tenderness/provocation:   []Yes   []No   [x]NA  Location:    PT notes decreased scar mobility:   []Yes   []No   [x]NA    Co-morbidities/Complexities (which will affect course of rehabilitation):  []None           Arthritic conditions   []Rheumatoid arthritis (M05.9)  [x]Osteoarthritis (M19.91)   Cardiovascular conditions   []Hypertension (I10)  []Hyperlipidemia (E78.5)  []Angina pectoris (I20)  []Atherosclerosis (I70)   Musculoskeletal conditions   [x]Disc pathology   []Congenital spine pathologies   []Prior surgical intervention  []Osteoporosis (M81.8)  []Osteopenia (M85.8)   Endocrine conditions   []Hypothyroid (E03.9)  []Hyperthyroid or hold objects   [x]Reduced ability to throw or toss an object   []other:    Participation Restrictions   [x]Reduced participation in self care activities   [x]Reduced participation in home management activities   []Reduced participation in work activities   [x]Reduced participation in social activities. []Reduced participation in sport/recreation activities. Classification:    []Signs/symptoms consistent with post-surgical status including decreased ROM, strength and function. []Signs/symptoms consistent with joint sprain/strain   [x]Signs/symptoms consistent with shoulder impingement   [x]Signs/symptoms consistent with shoulder/elbow/wrist tendinopathy   []Signs/symptoms consistent with Rotator cuff tear   []Signs/symptoms consistent with labral tear   [x]Signs/symptoms consistent with postural dysfunction    []Signs/symptoms consistent with Glenohumeral IR Deficit - <45 degrees   [x]Signs/symptoms consistent with facet dysfunction of cervical/thoracic spine    []Signs/symptoms consistent with pathology which may benefit from Dry needling     []other:     Rehabilitation Potential:  Good for goals listed below. Strengths for achieving goals include:  [x]Pt motivated   [x]PLOF   [x]Family support   Limitations for achieving goals include:  [x]None   []Severity of condition     []Cognitive   []Lack of family support   []Lack of resources     []Other:         Prognosis:   [x]Good   []Fair   []Poor    GOALS:  Short Term Goals:  2 weeks  1. Independent in HEP and progression per patient tolerance, in order to prevent re-injury. [] Progressing: [] Met: [] Not Met: [] Adjusted   2. Patient will have a decrease in pain to facilitate improvement in movement, function, and ADLs as indicated by Functional Deficits. [] Progressing: [] Met: [] Not Met: [] Adjusted     Long Term Goals:  8 weeks  1. Disability index score of 20% or less for the QuickDash to assist with reaching prior level of function.    []

## 2021-04-15 NOTE — FLOWSHEET NOTE
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)     Treatment Diagnosis: L shoulder weakness, limited ROM, poor posture, poor scap control, thoracic spine and ribcage dysfunction                              Onset Date:  2/1/21      Referral Date:  3/11/21           Referring Physician: Jesse Mendoza MD                                                Visits Allowed/Insurance/Certification Information: Carlito Matthews     Restrictions/Precautions:  none    Plan of care sent to provider:   []NA   [x]Faxed   []Co-signature       Plan of care signed:    [x]NA   []Yes   []No      Progress report will be due (10 Rx or 30 days whichever is less): 0/17/36     Recertification will be due (POC Duration  / 90 days whichever is less): 7/15/21     Visit# / total visits:     Visit # Insurance Allowable Auth Required   In Person 1/16 PRN []  Yes     [x]  No    Tele Health 0  []  Yes     []  No    Total 1/16       Plan for Next Session:  Reassess thoracic/rib alignment, AAROM L shoulder, shoulder isometrics, prone long arm ext, prone row with kickback. PROM and joint mobs to L shoulder      Subjective:  See eval     Pain level:   AT EVAL: Patient describes pain to be intermittent pain in L shoulder and scapular region, occasionally radiating into L neck and upper arm region   Patient reports pain is  0/10 pain at present and  7/10 pain at its worst.      Objective:       Exercises:    Exercises in bold performed in department today. Items not bolded are carried forward from prior visits for continuity of the record.   Exercise/Equipment Resistance/Repetitions Issued for [employer] Axial elongation x10      scap depression and retraction x10      Seated UT stretch 30 sec/ x3      Barrel stretch to R 30 sec/ x3                                                                                                           Therapeutic Exercise/Home Exercise Program:   x30 min. Patient educated on eval findings, plan of care, and goals. Instructed in HEP with handout given. Group Therapy:    0 minutes    Therapeutic Activity:  0 minutes     Gait: 0 minutes    Neuromuscular Re-Education:  0 minutes      Canalith Repositioning Procedure:  0 minutes    Manual Therapy:  15 minutes  Thoracic mob in supine   Neutral alignment achieved with patient reports that she could sit up better with less pain noted in scap region. Modalities: 0 minutes   [] GAME READY (VASO)- for significant edema, swelling, pain control. Functional Outcome Measure:   []NA  Measure Used: Quick dash  Date Assessed:  4/15/21  Score:  37=59%    Assessment/Treatment/Activity Tolerance:    Patients response to treatment:   [x]Patient tolerated treatment well with no adverse reactions noted    []Patient limited by fatigue   []Patient limited by pain    []Patient limited by other medical complications   [x]Other:  Pain localized to L shoulder 2-3/10, no pain in scapular region after treatment    Goals:   Progress towards goals:  Goals established on 4/15/21  GOALS:  Short Term Goals:  2 weeks  1. Independent in HEP and progression per patient tolerance, in order to prevent re-injury. []? Progressing: []? Met: []? Not Met: []? Adjusted            2. Patient will have a decrease in pain to facilitate improvement in movement, function, and ADLs as indicated by Functional Deficits. []? Progressing: []? Met: []? Not Met: []? Adjusted               Long Term Goals:  8 weeks  1. Disability index score of 20% or less for the QuickDash to assist with reaching prior level of function. []? Progressing: []? Met: []? Not Met: []?  Adjusted 2. Patient will demonstrate increased AROM to L shoulder =R shoulder to allow for proper joint functioning as indicated by patients Functional Deficits. []? Progressing: []? Met: []? Not Met: []? Adjusted            3. Patient will demonstrate an increase in strength to 4+/5 or greater throughout R shoulder/UE to allow for proper functional mobility as indicated by patients Functional Deficits. []? Progressing: []? Met: []? Not Met: []? Adjusted            4. Patient will return to all transfers, work activities, and functional activities without increased symptoms or restriction. []? Progressing: []? Met: []? Not Met: []? Adjusted            5. Patient will have 0-2/10 pain with ADL's.  []? Progressing: []? Met: []? Not Met: []? Adjusted            6. Correct dysfunction in thoracic spine/ribcage. []? Progressing: []? Met: []? Not Met: []? Adjusted    Prognosis: [x]Good   []Fair   []Poor    Patient Requires Follow-up:  [x]Yes  []No    Plan: [x]Plan of care initiated     []Continue per plan of care    [] Alter current plan (see comments)    []Hold pending MD visit []Discharge    Charges:  Timed Code Treatment Minutes: 45   Total Treatment Minutes:  60   BWC time for each procedure?:  TE TIME:  NMR TIME:  MANUAL TIME:  UNTIMED MINUTES:       [] EVAL (LOW) 35275 (typically 20 minutes face-to-face)  [x] EVAL (MOD) 86724 (typically 30 minutes face-to-face)  [] EVAL (HIGH) 19675 (typically 45 minutes face-to-face)  [] RE-EVAL     [x] MV(56065) x2     [] IONTO  [] NMR (33239) x     [] VASO  [x] Manual (19811) x1     [] Other:  [] TA x      [] Mech Traction (49318)  [] ES(attended) (52923)     [] ES (un) (54028): Medicare Cap total YTD:   [x]N/A    Electronically signed by:  Ruthy Umanzor PT, MPT [id number]    Note: If patient does not return for scheduled/ recommended follow up visits, this note will serve as a discharge from care along with most recent update on progress.

## 2021-04-20 ENCOUNTER — HOSPITAL ENCOUNTER (OUTPATIENT)
Dept: PHYSICAL THERAPY | Age: 51
Setting detail: THERAPIES SERIES
Discharge: HOME | End: 2021-04-20
Payer: MEDICARE

## 2021-04-23 ENCOUNTER — HOSPITAL ENCOUNTER (OUTPATIENT)
Dept: PHYSICAL THERAPY | Age: 51
Setting detail: THERAPIES SERIES
Discharge: HOME | End: 2021-04-23
Payer: MEDICARE

## 2021-04-27 ENCOUNTER — HOSPITAL ENCOUNTER (OUTPATIENT)
Dept: PHYSICAL THERAPY | Age: 51
Setting detail: THERAPIES SERIES
Discharge: HOME | End: 2021-04-27
Payer: MEDICARE

## 2021-04-27 PROCEDURE — 97140 MANUAL THERAPY 1/> REGIONS: CPT

## 2021-04-27 PROCEDURE — 97110 THERAPEUTIC EXERCISES: CPT

## 2021-04-27 NOTE — FLOWSHEET NOTE
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)     Treatment Diagnosis: L shoulder weakness, limited ROM, poor posture, poor scap control, thoracic spine and ribcage dysfunction                              Onset Date:  2/1/21      Referral Date:  3/11/21           Referring Physician: Arin Crane MD                                                Visits Allowed/Insurance/Certification Information: Álvaro Camp     Restrictions/Precautions:  none    Plan of care sent to provider:   []NA   [x]Faxed   []Co-signature       Plan of care signed:    [x]NA   []Yes   []No      Progress report will be due (10 Rx or 30 days whichever is less): 2/90/71     Recertification will be due (POC Duration  / 90 days whichever is less): 7/15/21     Visit# / total visits:     Visit # Insurance Allowable Auth Required   In Person 2/16 PRN []  Yes     [x]  No    Tele Health 0  []  Yes     []  No    Total 2/16       Plan for Next Session:  Reassess thoracic/rib alignment,  shoulder isometrics or RTC tband exercises, prone long arm ext, prone row with kickback      Subjective:  Patient doing well. She realize she injured her L shoulder closing her bedroom door while turning off the light on the stairwell. She is wondering if is would be ok to babysit her 3year old autistic grandson who is non-verbal and requires a lot of physical effort to change his diaper. She got headaches 15 min after performing the UT stretch. Most of her pain is in the L scapular region, with minimal pain in her L shoulder. She is now able to don/doff bra without difficulty.   Only getting migraines 3-4x/month with almost daily headaches. Pain level:  0/10 currently, and 3/10 at worst since last visit. AT EVAL: Patient describes pain to be intermittent pain in L shoulder and scapular region, occasionally radiating into L neck and upper arm region   Patient reports pain is  0/10 pain at present and  7/10 pain at its worst.      Objective:       Exercises:    Exercises in bold performed in department today. Items not bolded are carried forward from prior visits for continuity of the record. Exercise/Equipment Resistance/Repetitions Issued for HEP     Axial elongation x10      scap depression and retraction x10      Seated UT stretch 30 sec/ x3, modified to avoid headaches, no hand on head,       Barrel stretch to R 30 sec/ x3      AAROM L shoulder  (flex, abd, ER) x5 each direction                                                                                                    Therapeutic Exercise/Home Exercise Program:   x15 min. Reviewed and progressed exercises as noted above with new handout given. Group Therapy:    0 minutes    Therapeutic Activity:  0 minutes     Gait: 0 minutes    Neuromuscular Re-Education:  0 minutes      Canalith Repositioning Procedure:  0 minutes    Manual Therapy:  30 minutes  Thoracic/rib mob in supine, neutral alignment achieved. STM and TPR to B UT, levator scap,SCM, scalenes  Gentle manual cervical traction  B shoulder depression  Manual UT stretch within comfortable range  STM to scalp        Modalities: 0 minutes   [] GAME READY (VASO)- for significant edema, swelling, pain control. Functional Outcome Measure:   []NA  Measure Used: Quick dash  Date Assessed:  4/15/21  Score:  37=59%    Assessment/Treatment/Activity Tolerance:   Instructed patient to let her pain determine her ability to babysit her autistic grandson. If she is having pain with functional tasks, babysitting might not be a good idea, but if pain-free can try.   Patients response to treatment:   [x]Patient tolerated treatment well with no adverse reactions noted    []Patient limited by fatigue   []Patient limited by pain    []Patient limited by other medical complications   [x]Other:  Pain decreased to 0/10 in all areas after treatment. Goals:   Progress towards goals:  Goals established on 4/15/21  GOALS:  Short Term Goals:  2 weeks  1. Independent in HEP and progression per patient tolerance, in order to prevent re-injury. []? Progressing: []? Met: []? Not Met: []? Adjusted            2. Patient will have a decrease in pain to facilitate improvement in movement, function, and ADLs as indicated by Functional Deficits. []? Progressing: []? Met: []? Not Met: []? Adjusted               Long Term Goals:  8 weeks  1. Disability index score of 20% or less for the QuickDash to assist with reaching prior level of function. []? Progressing: []? Met: []? Not Met: []? Adjusted           2. Patient will demonstrate increased AROM to L shoulder =R shoulder to allow for proper joint functioning as indicated by patients Functional Deficits. []? Progressing: []? Met: []? Not Met: []? Adjusted            3. Patient will demonstrate an increase in strength to 4+/5 or greater throughout R shoulder/UE to allow for proper functional mobility as indicated by patients Functional Deficits. []? Progressing: []? Met: []? Not Met: []? Adjusted            4. Patient will return to all transfers, work activities, and functional activities without increased symptoms or restriction. []? Progressing: []? Met: []? Not Met: []? Adjusted            5. Patient will have 0-2/10 pain with ADL's.  []? Progressing: []? Met: []? Not Met: []? Adjusted            6. Correct dysfunction in thoracic spine/ribcage. []? Progressing: []? Met: []? Not Met: []?  Adjusted    Prognosis: [x]Good   []Fair   []Poor    Patient Requires Follow-up:  [x]Yes  []No    Plan: []Plan of care initiated     [x]Continue per plan of care    []

## 2021-04-30 ENCOUNTER — HOSPITAL ENCOUNTER (OUTPATIENT)
Dept: PHYSICAL THERAPY | Age: 51
Setting detail: THERAPIES SERIES
Discharge: HOME | End: 2021-04-30
Payer: MEDICARE

## 2021-04-30 NOTE — FLOWSHEET NOTE
Physical Therapy  Cancellation/No-show Note  Patient Name:  Carmen Lunsford  :  1970   Date:  2021  Cancels to Date: 1  No-shows to Date: 2    For today's appointment patient:  []  Cancelled  []  Rescheduled appointment  [x]  No-show     Reason given by patient:  []  Patient ill  []  Conflicting appointment  []  No transportation    []  Conflict with work  []  No reason given  []  Other:     Comments:      Electronically signed by:  Cole Aldrich PT, MPT [id number]

## 2021-05-04 ENCOUNTER — HOSPITAL ENCOUNTER (OUTPATIENT)
Dept: PHYSICAL THERAPY | Age: 51
Setting detail: THERAPIES SERIES
Discharge: HOME | End: 2021-05-04
Payer: MEDICARE

## 2021-05-04 PROCEDURE — 97035 APP MDLTY 1+ULTRASOUND EA 15: CPT

## 2021-05-04 PROCEDURE — 97110 THERAPEUTIC EXERCISES: CPT

## 2021-05-04 PROCEDURE — 97140 MANUAL THERAPY 1/> REGIONS: CPT

## 2021-05-04 NOTE — FLOWSHEET NOTE
level:  0/10 currently, and 7/10 at worst since last visit. AT EVAL: Patient describes pain to be intermittent pain in L shoulder and scapular region, occasionally radiating into L neck and upper arm region   Patient reports pain is  0/10 pain at present and  7/10 pain at its worst.      Objective:       Exercises:    Exercises in bold performed in department today. Items not bolded are carried forward from prior visits for continuity of the record. Exercise/Equipment Resistance/Repetitions Issued for HEP     Axial elongation x10      scap depression and retraction X10, progressed to wing breathing      Seated UT stretch 30 sec/ x3, modified to avoid headaches, no hand on head,       Barrel stretch to R 30 sec/ x3      AAROM L shoulder  (flex, abd, ER) x5 each direction      Rhythmic stabilization x1  Min with manual resistance      PNF with D2 x10 reps with light resistance      Ceiling punches 2x10      Figure 8 2x10        Prone row 2x10      Prone long arm ext  2x10                                                       Therapeutic Exercise/Home Exercise Program:   x30 min. Reviewed and progressed exercises as noted above with new handout given. Group Therapy:    0 minutes    Therapeutic Activity:  0 minutes     Gait: 0 minutes    Neuromuscular Re-Education:  0 minutes      Canalith Repositioning Procedure:  0 minutes    Manual Therapy:  15 minutes  PROM to L shoulder  Inf and post joint mobs to [facility], grade 3-4         Modalities: 10 minutes   [] GAME READY (VASO)- for significant edema, swelling, pain control.    US to L ant shoulder, 50%, 3mhz     Functional Outcome Measure:   []NA  Measure Used: Quick dash  Date Assessed:  4/15/21  Score:  37=59%    Assessment/Treatment/Activity Tolerance:     Patients response to treatment:   [x]Patient tolerated treatment well with no adverse reactions noted    []Patient limited by fatigue   []Patient limited by pain    []Patient limited by other medical complications   [x]Other:  Pain decreased to 0/10 in all areas after treatment. Goals:   Progress towards goals:  Goals established on 4/15/21  GOALS:  Short Term Goals:  2 weeks  1. Independent in HEP and progression per patient tolerance, in order to prevent re-injury. []? Progressing: []? Met: []? Not Met: []? Adjusted            2. Patient will have a decrease in pain to facilitate improvement in movement, function, and ADLs as indicated by Functional Deficits. []? Progressing: []? Met: []? Not Met: []? Adjusted               Long Term Goals:  8 weeks  1. Disability index score of 20% or less for the QuickDash to assist with reaching prior level of function. []? Progressing: []? Met: []? Not Met: []? Adjusted           2. Patient will demonstrate increased AROM to L shoulder =R shoulder to allow for proper joint functioning as indicated by patients Functional Deficits. []? Progressing: []? Met: []? Not Met: []? Adjusted            3. Patient will demonstrate an increase in strength to 4+/5 or greater throughout R shoulder/UE to allow for proper functional mobility as indicated by patients Functional Deficits. []? Progressing: []? Met: []? Not Met: []? Adjusted            4. Patient will return to all transfers, work activities, and functional activities without increased symptoms or restriction. []? Progressing: []? Met: []? Not Met: []? Adjusted            5. Patient will have 0-2/10 pain with ADL's.  []? Progressing: []? Met: []? Not Met: []? Adjusted            6. Correct dysfunction in thoracic spine/ribcage. []? Progressing: []? Met: []? Not Met: []?  Adjusted    Prognosis: [x]Good   []Fair   []Poor    Patient Requires Follow-up:  [x]Yes  []No    Plan: []Plan of care initiated     [x]Continue per plan of care    [] Alter current plan (see comments)    []Hold pending MD visit []Discharge    Charges:  Timed Code Treatment Minutes: 55   Total Treatment Minutes:  55   [facility] time for each procedure?:  TE TIME:  NMR TIME:  MANUAL TIME:  UNTIMED MINUTES:       [] EVAL (LOW) 81835 (typically 20 minutes face-to-face)  [] EVAL (MOD) 99297 (typically 30 minutes face-to-face)  [] EVAL (HIGH) 31575 (typically 45 minutes face-to-face)  [] RE-EVAL     X KV(02030) x2   [] IONTO  [] NMR (66426) x     [] VASO  [x] Manual (62341) x1     [x] Other:  US  [] TA x      [] Mech Traction (77018)  [] ES(attended) (63858)     [] ES (un) (14804): Medicare Cap total YTD:   [x]N/A    Electronically signed by:  Susan Black, PT, MPT [id number]    Note: If patient does not return for scheduled/ recommended follow up visits, this note will serve as a discharge from care along with most recent update on progress.

## 2021-05-06 ENCOUNTER — HOSPITAL ENCOUNTER (OUTPATIENT)
Dept: PHYSICAL THERAPY | Age: 51
Setting detail: THERAPIES SERIES
Discharge: HOME | End: 2021-05-06
Payer: MEDICARE

## 2021-07-13 ENCOUNTER — HOSPITAL ENCOUNTER (OUTPATIENT)
Age: 51
Discharge: HOME | End: 2021-07-13
Payer: MEDICARE

## 2021-07-13 ENCOUNTER — HOSPITAL ENCOUNTER (OUTPATIENT)
Dept: GENERAL RADIOLOGY | Age: 51
Discharge: HOME | End: 2021-07-13
Payer: MEDICARE

## 2021-07-13 DIAGNOSIS — M54.16 LUMBAR RADICULOPATHY, ACUTE: ICD-10-CM

## 2021-07-13 PROCEDURE — 72100 X-RAY EXAM L-S SPINE 2/3 VWS: CPT

## 2021-08-03 ENCOUNTER — HOSPITAL ENCOUNTER (OUTPATIENT)
Dept: PHYSICAL THERAPY | Age: 51
Setting detail: THERAPIES SERIES
Discharge: HOME | End: 2021-08-03
Payer: MEDICARE

## 2021-08-03 PROCEDURE — 97162 PT EVAL MOD COMPLEX 30 MIN: CPT

## 2021-08-03 PROCEDURE — 97110 THERAPEUTIC EXERCISES: CPT

## 2021-08-03 PROCEDURE — 97140 MANUAL THERAPY 1/> REGIONS: CPT

## 2021-08-03 NOTE — FLOWSHEET NOTE
[address] [place] [address] [facility] DANAE Chavez [address]  Phone [phone]  Fax [phone]    Outpatient Physical Therapy     [x] Daily Treatment Note   [] Progress Note   [] Discharge Note    Date:  8/3/2021    Patient Name:  Faviola Last         YOB: 1970    Medical Diagnosis:  Lumbar radiculopathy, acute (M54.16)  Treatment Diagnosis:  Pelvic/sacral dysfunction, LE weakness, limited ROM, decreased functional status      Onset Date: 7/10/21  Referral Date: 7/13/21  Referring Physician: Rafita Pugh MD      Visits Allowed/Insurance/Certification Information:  [facility] dual complete, no precert, med nec      Restrictions/Precautions:  none    Plan of care sent to provider:   []NA   [x]Faxed   []Co-signature       Plan of care signed:    [x]NA   []Yes   []No      Progress report will be due (10 Rx or 30 days whichever is less):  5/10/77     Recertification will be due (POC Duration  / 90 days whichever is less): 11/3/21     Visit# / total visits:     Visit # Insurance Allowable Auth Required   In Person 1/16 Med nec []  Yes     [x]  No    Tele Health 0  []  Yes     []  No    Total 1/16       Plan for Next Session:  Add nustep, supine TA, TA with march, TA with controlled knee fallout, seated HS stretch, piriformis stretch      Subjective:  See eval     Pain level:   AT EVAL: Patient describes pain to be intermittent across low back radiating down L leg to knee level  Patient reports pain is  1-2/10 pain at present and  8/10 pain at its worst.  Worsened by:  Stooping, bending, lifting, standing >10 min, walking >10 min, sitting >20 min        Objective:       Exercises:    Exercises in bold performed in department today. Items not bolded are carried forward from prior visits for continuity of the record.   Exercise/Equipment Resistance/Repetitions Issued for HEP     nustep nv    Prone glut squeeze 6 sec/ x10    Prone hip IR/ER x10 Prone TA 6 sec x10      Clam shells x10 B                                                                                                    Therapeutic Exercise/Home Exercise Program:   x30 min. Patient educated on eval findings, plan of care, and goals. Instructed in HEP with handout given. Group Therapy:    0 minutes    Therapeutic Activity:  0 minutes     Gait: 0 minutes    Neuromuscular Re-Education:  0 minutes      Canalith Repositioning Procedure:  0 minutes    Manual Therapy:  15 minutes  Patient noted to have  L ant pelvic rotation, and L on R sacral torsion  Performed B supine lumbopelvic roll, sidelying lumbar roll, and sidelying MET for sacral torsion. Neutral alignment achieved with manual therapy. Modalities: 0 minutes   [] GAME READY (VASO)- for significant edema, swelling, pain control. Functional Outcome Measure:   []NA  Measure Used: oswestry  Date Assessed: 8/3/21  Score: 72%    Assessment/Treatment/Activity Tolerance:    Patients response to treatment:   [x]Patient tolerated treatment well with no adverse reactions noted    []Patient limited by fatigue   []Patient limited by pain    []Patient limited by other medical complications   [x]Other: pain decreased to 1-2/10 across low back, with no pain radiating down her LE's. Goals:   Progress towards goals:  Goals established on 8/3/21  GOALS:  Short Term Goals: 2 weeks  1. Independent in HEP and progression per patient tolerance, in order to prevent re-injury. []? Progressing: []? Met: []? Not Met: []? Adjusted            2. Patient will have a decrease in pain to facilitate improvement in movement, function, and ADLs as indicated by Functional Deficits. []? Progressing: []? Met: []? Not Met: []? Adjusted               Long Term Goals:  8 weeks  1. Disability index score of 20% or less for the oswestry functional questionnaire to assist with reaching prior level of function. []? Progressing: []? Met: []? Not Met: []?  Adjusted 2. Patient will demonstrate increased  Lumbar AROM and hamstring flexibility  to WNL to allow for proper joint functioning as indicated by patients Functional Deficits. []? Progressing: []? Met: []? Not Met: []? Adjusted            3. Patient will demonstrate an increase in strength to 4+/5 or greater throughout B LE's to allow for proper functional mobility as indicated by patients Functional Deficits. []? Progressing: []? Met: []? Not Met: []? Adjusted            4. Patient will return to all transfers, work activities, and functional activities without increased symptoms or restriction. []? Progressing: []? Met: []? Not Met: []? Adjusted            5. Patient will have 0-2/10 pain with ADL's.  []? Progressing: []? Met: []? Not Met: []? Adjusted            6. Patient stated goal: Sleep through the night without waking due to pain  []? Progressing: []? Met: []? Not Met: []? Adjusted    Prognosis: [x]Good   []Fair   []Poor    Patient Requires Follow-up:  [x]Yes  []No    Plan: [x]Plan of care initiated     []Continue per plan of care    [] Alter current plan (see comments)    []Hold pending MD visit []Discharge    Charges:  Timed Code Treatment Minutes: 45   Total Treatment Minutes:  60   [facility] time for each procedure?:  TE TIME:  NMR TIME:  MANUAL TIME:  UNTIMED MINUTES:         [] EVAL (LOW) 48533 (typically 20 minutes face-to-face)  [x] EVAL (MOD) 07669 (typically 30 minutes face-to-face)  [] EVAL (HIGH) 91364 (typically 45 minutes face-to-face)  [] RE-EVAL     [x] BN(88450) x 2    [] IONTO  [] NMR (40446) x     [] VASO  [x] Manual (16158) x1     [] Other:  [] TA x      [] Mech Traction (19039)  [] ES(attended) (21865)     [] ES (un) (76171):     Medicare Cap total YTD:   [x]N/A    Electronically signed by:  Staci Medrano, PT, MPT, OMT-C [id number]      Note: If patient does not return for scheduled/ recommended follow up visits, this note will serve as a discharge from care along with most recent update on progress.

## 2021-08-03 NOTE — PROGRESS NOTES
[facility], [facility], [address]  Phone: [phone]                                                    Physical Therapy Certification    We had the pleasure of evaluating the following patient for physical therapy services at [address] [place] [address]. A summary of our findings can be found in the initial assessment below. This includes our plan of care. If you have any questions or concerns regarding these findings, please do not hesitate to contact me at the office phone number checked above. Thank you for the referral.       Physician Signature:_______________________________Date:__________________  By signing above (or electronic signature), therapists plan is approved by physician    [id number] [facility] EVALUATION    Patient: Deyvi Burns   : 1970   MRN: [id number]     Medical Diagnosis:  Lumbar radiculopathy, acute (M54.16)  Treatment Diagnosis:  Pelvic/sacral dysfunction, LE weakness, limited ROM, decreased functional status      Onset Date: 7/10/21  Referral Date: 21  Referring Physician: Jodee Ghosh MD      Visits Allowed/Insurance/Certification Information:  Jessica dual complete, no precert, med nec      Restrictions/Precautions:  none    C-SSRS Triggered by Intake questionnaire (Past 2 wk assessment):   [x] No, Questionnaire did not trigger screening.   [] Yes, Patient intake triggered further evaluation      [] C-SSRS Screening completed  [] PCP notified via Plan of Care  [] Emergency services notified     Pt's Occupation/Job Duties:  Disability- enjoys fishing, berry picking, hiking, gardening. Takes care of parents who live with her. Social support/Environment:    Family/caregiver support:   [x]Yes, ? Yes, lives with , step-son (21), and her parents who have dementia and other medical issues   []No  Home Environment:   []1 story   [x]>2 story   [x] PIOTR- 2   []No rail   [x]Handrail Bedroom on second floor    Health History reviewed with pt:   [x]Yes   []No       2007 subarachnoid brain hemorrhage with surgical decompression, short term memory deficits, depression, anxiety, Lumbar DDD, OA, alcohol abuse 2014, heart murmur, heart palpitations, migraines (received botox injections), overactive bladder, IBS, sciatic nerve disease,  Choely, endometrial ablation, tonsillectomy, tubal ligation, smokes 2 ppd. SUBJECTIVE FINDINGS         History of Present Illness:         Pt reports that she has had LBP for the past 17 years when she bent forward and felt instant pain in her back. She had pain for several months before the pain went away. She has had a reoccurance of her pain 3 times since that episode. Recently, three weeks ago, she woke up with insidious onset of low back pain radiating down L leg to toes, but now the pain is only going to her knee level. She is treating her pain with percocet and prednisone, but she is now done with the prednisone. She had recent xrays and an old MRI as noted below. 7/13/21 xray lumbar spine-   FINDINGS:   Lumbar vertebrae demonstrate normal height and alignment.  No fracture,   subluxation, or suspicious osseous lesion identified.  There is severe disc   height loss at the lumbosacral junction.  Moderate disc height loss at L4-L5. There is mild disc height loss at the remainder of lumbar levels.  There is   facet arthrosis at the lumbosacral junction.  Soft tissues are without acute   process.  Degenerative changes at L4-L5 appear to progressed compared to   prior. 10/14/15 MRI lumbar spine  Impression:       Mild enhancement of the aforementioned extradural rounded lesion   along the left lateral recess/foramen at L3-L4 which courses along   the L4 nerve root, and is favored to represent either a mildly   enhancing nerve sheath tumor such as a schwannoma or meningioma,   or possibly focal neuritis.  A mildly enhancing disc extrusion is considered less likely. Pain       Patient describes pain to be intermittent across low back radiating down L leg to knee level  Patient reports pain is  1-2/10 pain at present and  8/10 pain at its worst.  Worsened by:  Stooping, bending, lifting, standing >10 min, walking >10 min, sitting >20 min    Improved by:  nothing    Pt. reports pain with coughing, sneezing and laughing:    []Yes   [x]No   []NA   Pt. reports bowel and bladder changes:      []Yes   [x]No   []NA   Pt. reports knee/hip/ankle crepitus, locking, buckling:   [x]Yes   []No   []NA  L Leg buckled on her 1 week ago, she did not fall, was pushing a shopping cart     Current Functional Limitations:   [x]Yes   []No  Functional complaints: not able to garden, limited with household chores     PLOF:   [x]No functional limitations   []Pt with previous functional limitations   Pt's sleep is affected?    [x]Yes, not sleeping well due to pain   []No     Patient goal for therapy:  \"get pain to stop, avoid surgery\"          OBJECTIVE FINDINGS         Gait/Steps/Balance       []Gait WNL unless otherwise noted below:                             [x]Deviations on a level linoleum surface include:  Slightly antalgic on L, no AD    []Steps WNL (reciprocal pattern with 0-1 rail) unless otherwise noted below:    [x]Deviations include: alt with B rails, decreased eccentric control with descending stairs    [x]All balance WNL unless otherwise noted below:      Static/ Dynamic Sitting:    Static/Dynamic Standing:      Quick Tests/Functional Myotome Tests:   Unilateral sit to stand (L1-3)   []NT  []Able to perform WNL   []Unable to perform     Heel Walk (L4):       []NT  []Able to perform WNL   [x]Unable to perform         Toe Walk (S1):        []NT  []Able to perform WNL   [x]Unable to perform        Posture: [] WNL  [x]Forward head   []Forward flexed trunk    []Pronounced CT junction    [x]Increased thoracic kyphosis   []Increased lumbar lordosis   []Scoliosis []Swayback   []Decreased WB on   []R   []L     []Other:       Special Tests- standing   (C)= Keven's Criteria: 3/4 Positive tests or (+) Fortins sign with two additional (+) tests  Special Test Findings   Vianey's Sign                (C)                  []Neg   []Pos R   [x]Pos L   []NT   Standing Landmarks []Iliac Crests Equal   []R High  [x]L High  []PSIS Equal   []R High  []L High  []ASIS Equal   []R High  []L High   []NT  []Difficult to assess due to body habitus    Standing Flexion Test  (C) []Neg   []Pos R   [x]Pos L   []NT   March Test (Gillet's Test) []Neg   []Pos R   [x]Pos L   []NT   Sacral Sulci Test  []Neg   []Pos R   []Pos L   []NT   Cigarette Butt Test:  []Neg   []Pos R   []Pos L   []NT     Lumbar Range of MotionTesting      [x]All WNL except as marked below     ROM (*denotes pain) AROM COMMENTS   Flexion 80*    Extension 15*    Sidebending Left 27*    Sidebending Right 25    Rotation Left  45* []Seated  [x]Standing       Rotation Right 45 []Seated  [x]Standing         Special Tests- seated     Special Test Findings   Seated pelvic landmarks (C) [x]Iliac Crests Equal   []R High   []L High  []PSIS Equal   []R High  []L High  []Difficult to assess due to body habitus   Sitting flexion test  []Neg   []Pos R   []Pos L   []NT   Hip IR PROM  []WNL []Pos R    [x]Pos L   []NT        Slump test/Dural tension  [x]Neg   []Pos R   []Pos L   []NT       Reflexes     [x]All reflexes WNL (2+) or negative test except as marked below    [x]All strength WNL (5/5) or negative test except as marked below    Reflex Left Right Comments   Meagan's Reflex      Biceps (C5,6)      Brachioradialis (C6)      Triceps (C7)      Quadriceps (L3,4)   2 2 []Pendular x 3 R  []Pendular x 3 L   Achilles (S1,2) 1 2    Ankle clonus   []# Beats   Babinski's reflex         Sensation   [x]All dermatomes WNL for light touch     Range of Motion/Strength Testing-Myotomes    [x]All ROM WNL except as marked below    [x]All strength WNL (5/5) except as marked below (measured out of 5)   [x]All  myotomes WNL (5/5) except as marked below (measured out of 5)      Range Tested AROM PROM MMT/Resisted Comments   *denotes pain Left Right Left Right Left Right    Hip Flexion  (L1-2)     4/5 4+/5    Hip Extension     4-/5 4/5    Hip Abduction  (L5)     4/5 5/5    Hip Adduction  (L3)     4/5 4/5    Hip IR          Hip ER          Knee Flexion  (L5,S1)     4/5 5/5    Knee Extension  (L3,4)     4/5 5/5    Ankle Dorsiflex  (L4)     4/5 5/5    Ankle Plantarflex  (S1,2)     4/5 5/5    Ankle Inversion          Ankle Eversion          Great Toe Ext  (L5)     4/5 5/5        Flexibility     Muscle Findings   Hip flexors/Jose Miguel  []WNL   []Decreased R   []Decreased L   []NT   Hamstrings  Degrees in 90/90 []WNL   []Decreased R   []Decreased L   []NT  Right: -20             Left:  -40    Gastrocs []WNL   []Decreased R   []Decreased L   []NT   Obers/TFL/ITB []WNL   []Decreased R   []Decreased L   []NT   Piriformis  []WNL   []Decreased R   [x]Decreased L   []NT   Other:  []WNL   []Decreased R   []Decreased L   []NT     Special Tests Lumbosacral and hip- supine/sidelying/prone    (L) = Laslett's Criteria: 2 positive tests  Special Test Findings   Sit up test/ Supine Long sit test  (C) []Neg   []Pos R   [x]Pos L   []NT  Comments: L ant  rot   SI distraction                               (L) []Neg   []Pos   []NT   Thigh Thrust test                         (L) []Neg   []Pos R   [x]Pos L   []NT   90/90 test  []Neg   []Pos R   []Pos L   []NT   Gaenslen's test []Neg   []Pos R   []Pos L   []NT   Straight Leg Raise [x]Neg   []Pos R   []Pos L   []NT   Crams []Neg   []Pos R   []Pos L   []NT   Lumbar Distraction  []Relief noted   []No relief noted  []Rebound pain   []NT   Hip scour []Neg   []Pos R   [x]Pos L   []NT   Morenita's test []Neg   []Pos R   [x]Pos L   []NT   Jessee's test []Neg   []Pos R   []Pos L   []NT   Oscillation []Neg   []Pos R   []Pos L   []NT   Ant/Post Provocation  []Neg []Pos R   []Pos L   []NT   SI compression                           (L) []Neg   []Pos   []NT   Prone knee flexion test               (C) [x]Neg   []Pos R   []Pos L   []NT  Comments:    Femoral nerve tension test []Neg   []Pos R   []Pos L   []NT   Pheasant test []Neg   []Pos R   []Pos L   []NT   Sacral thrusts                              (L) []Neg   []Pos   []NT  []Base   [][place]   []R Sacral Sulcus  []L Sacral Sulcus   []R AMI   []L AMI     Palpation   L ant pelvic rotation, and L on R sacral torsion  Patient reported tenderness with palpation:  [x]Yes   []No   []NT  Location:  L SI joint, L piriformis, along SP L3-5  PT notes warmth:  []Yes   [x]No   []NT  Location:   PT notes increased muscle tone:   [x]Yes   []No   []NT  Location: L hamstring and piriformis  PT notes crepitus with palpation:   []Yes   [x]No   []NT   Location:     Co-morbidities/Complexities (which will affect course of rehabilitation):  []None           Arthritic conditions   []Rheumatoid arthritis (M05.9)  [x]Osteoarthritis (M19.91)   Cardiovascular conditions   []Hypertension (I10)  []Hyperlipidemia (E78.5)  []Angina pectoris (I20)  []Atherosclerosis (I70)   Musculoskeletal conditions   [x]Disc pathology   []Congenital spine pathologies   []Prior surgical intervention  []Osteoporosis (M81.8)  []Osteopenia (M85.8)   Endocrine conditions   []Hypothyroid (E03.9)  []Hyperthyroid Gastrointestinal conditions   []Constipation (B21.03)   Metabolic conditions   []Morbid obesity (E66.01)  []Diabetes type 1(E10.65) or 2 (E11.65)   []Neuropathy (G60.9)     Pulmonary conditions   []Asthma (J45)  []Coughing   []COPD (J44.9)   Psychological Disorders  []Anxiety (F41.9)  [x]Depression (F32.9)   [x]Other: hx alcohol abuse   [x]Other:    Subarachnoid hemorrhage with surgery  STM issues        Functional Outcome Measure:   []NA  Measure Used: oswestry  Date Assessed: 8/3/21  Score: 72%    ASSESSMENT: Patient presents with s/s consistent with Dx.   Recommend PT treatment to address problem list so that the patient may return to regular activities without any functional limitations noted. Functional Impairments:     []Noted lumbar/proximal hip/LE joint hypomobility   []Decreased LE functional ROM   [x]Decreased core/proximal hip strength and neuromuscular control   [x]Decreased LE functional strength   []Reduced balance/proprioceptive control   [x]other:  Pelvic/sacral dysfunction    Functional Activity Limitations (from functional questionnaire and intake)   [x]Reduced ability to tolerate prolonged functional positions   [x]Reduced ability or difficulty with changes of positions or transfers between positions   [x]Reduced ability to maintain good posture and demonstrate good body mechanics with sitting, bending, and lifting   [x]Reduced ability to sleep   [x] Reduced ability or tolerance with driving and/or computer work   [x]Reduced ability to perform lifting, carrying tasks   [x]Reduced ability to squat   [x]Reduced ability to forward bend   [x]Reduced ability to ambulate prolonged functional periods/distances/surfaces   [x]Reduced ability to ascend/descend stairs   [x]Reduced ability to run, hop, cut or jump   []other:    Participation Restrictions   [x]Reduced participation in self care activities   [x]Reduced participation in home management activities   [x]Reduced participation in work activities   [x]Reduced participation in social activities. [x]Reduced participation in sport/recreation activities. Classification :    []Signs/symptoms consistent with post-surgical status including decreased ROM, strength and function.    []Signs/symptoms consistent with joint sprain/strain   []Signs/symptoms consistent with patella-femoral syndrome   []Signs/symptoms consistent with knee OA/hip OA   []Signs/symptoms consistent with internal derangement of knee/Hip   [x]Signs/symptoms consistent with functional hip weakness/NMR control      []Signs/symptoms consistent with tendinitis/tendinosis    []signs/symptoms consistent with pathology which may benefit from Dry needling      [x]other: pelvic/sacral dysfunction, radiculopathy     Rehabilitation Potential:  Good for goals listed below. Strengths for achieving goals include:   [x]Pt motivated   [x]PLOF   []Family support   Limitations for achieving goals include: []Pain  []Severity of condition     []Cognitive   []Lack of family support   []Lack of resources     []Other:         Prognosis:   [x]Good   []Fair   []Poor    GOALS:  Short Term Goals: 2 weeks  1. Independent in HEP and progression per patient tolerance, in order to prevent re-injury. [] Progressing: [] Met: [] Not Met: [] Adjusted   2. Patient will have a decrease in pain to facilitate improvement in movement, function, and ADLs as indicated by Functional Deficits. [] Progressing: [] Met: [] Not Met: [] Adjusted     Long Term Goals:  8 weeks  1. Disability index score of 20% or less for the oswestry functional questionnaire to assist with reaching prior level of function. [] Progressing: [] Met: [] Not Met: [] Adjusted   2. Patient will demonstrate increased  Lumbar AROM and hamstring flexibility  to WNL to allow for proper joint functioning as indicated by patients Functional Deficits. [] Progressing: [] Met: [] Not Met: [] Adjusted   3. Patient will demonstrate an increase in strength to 4+/5 or greater throughout B LE's to allow for proper functional mobility as indicated by patients Functional Deficits. [] Progressing: [] Met: [] Not Met: [] Adjusted   4. Patient will return to all transfers, work activities, and functional activities without increased symptoms or restriction. [] Progressing: [] Met: [] Not Met: [] Adjusted   5. Patient will have 0-2/10 pain with ADL's.  [] Progressing: [] Met: [] Not Met: [] Adjusted   6.  Patient stated goal: Sleep through the night without waking due to pain  [] Progressing: [] Met: [] Not Met: [] Adjusted     PLAN OF CARE     To see patient  1-2 x/week for 8  weeks for the following treatment interventions:  [x]Therapeutic Exercise  [x]Modalities of Choice:   []Heat   []Cold   []US   []Estim   []Ionto  [x]Mechanical Traction   [x]Manual Therapy  [x]Neuromuscular Re-Education  [x]Gait Training   [x]Therapeutic Activity  []Other     Physical Therapy Evaluation Complexity Justification  [] EVAL (LOW) 51756 (typically 20 minutes face-to-face)  [x] EVAL (MOD) 71256 (typically 30 minutes face-to-face)  [] EVAL (HIGH) 24065 (typically 45 minutes face-to-face)  [] RE-EVAL     Thank you for the referral of this patient.       Timed Code Treatment Minutes:  45   minutes   Total Treatment Time:  60  minutes    Calvin Gordon, PT, MPT, OMT-C [id number]

## 2021-08-05 ENCOUNTER — HOSPITAL ENCOUNTER (OUTPATIENT)
Dept: PHYSICAL THERAPY | Age: 51
Setting detail: THERAPIES SERIES
Discharge: HOME | End: 2021-08-05
Payer: MEDICARE

## 2021-08-05 PROCEDURE — 97110 THERAPEUTIC EXERCISES: CPT

## 2021-08-05 NOTE — FLOWSHEET NOTE
[address] [facility] DANAE Chavez [address]  Phone [phone]  Fax [phone]    Outpatient Physical Therapy     [x] Daily Treatment Note   [] Progress Note   [] Discharge Note    Date:  8/5/2021    Patient Name:  Earline Marie         YOB: 1970    Medical Diagnosis:  Lumbar radiculopathy, acute (M54.16)  Treatment Diagnosis:  Pelvic/sacral dysfunction, LE weakness, limited ROM, decreased functional status      Onset Date: 7/10/21  Referral Date: 7/13/21  Referring Physician: Fortino Brittle, MD      Visits Allowed/Insurance/Certification Information:  [facility] dual complete, no precert, med nec      Restrictions/Precautions:  none    Plan of care sent to provider:   []NA   [x]Faxed   []Co-signature       Plan of care signed:    [x]NA   []Yes   []No      Progress report will be due (10 Rx or 30 days whichever is less):  7/53/41     Recertification will be due (POC Duration  / 90 days whichever is less): 11/3/21     Visit# / total visits:     Visit # Insurance Allowable Auth Required   In Person 2/16 Med nec []  Yes     [x]  No    Tele Health 0  []  Yes     []  No    Total 2/16       Plan for Next Session:  Add standing high marches, standing heel raises, standing gastroc stretch, reverse crunch with tball, lat rolls with tball, sidelying hip abd, prone hip ext alt      Subjective:  Patient feeling good today. No reports of low back pain since last visit, but having soreness in B quads. Performed HEP 1x since last visit. She feels she has had more energy the past 2 days.      Pain level: 0/10 currently, and at worst since last visit  AT EVAL: Patient describes pain to be intermittent across low back radiating down L leg to knee level  Patient reports pain is  1-2/10 pain at present and  8/10 pain at its worst.  Worsened by:  Stooping, bending, lifting, standing >10 min, walking >10 min, sitting >20 min        Objective: Exercises:    Exercises in bold performed in department today. Items not bolded are carried forward from prior visits for continuity of the record. Exercise/Equipment Resistance/Repetitions Issued for HEP     nustep L5, x5 min    Prone glut squeeze 6 sec/ x10    Prone hip IR/ER x10       Prone/supine TA 6 sec x10      Clam shells x20 B      bridges x10      Supine TA with march x10 B      Supine TA with controlled knee fallout x10 B      Supine piriformis stretch 30 sec/ x2        Seated HS stretch 30 sec/ x2       Standing multifidus handshake  3x10 B      Prone quad stretch 30 sec/ x2 B                                                Therapeutic Exercise/Home Exercise Program:   x40 min. Reviewed and progressed exercises as noted above with new handout given. Group Therapy:    0 minutes    Therapeutic Activity:  0 minutes     Gait: 0 minutes  2Neuromuscular Re-Education:  0 minutes      Canalith Repositioning Procedure:  0 minutes    Manual Therapy:  0 minutes    Neutral alignment achieved without manual therapy needed today. Modalities: 0 minutes   [] GAME READY (VASO)- for significant edema, swelling, pain control. Functional Outcome Measure:   []NA  Measure Used: oswestry  Date Assessed: 8/3/21  Score: 72%    Assessment/Treatment/Activity Tolerance:    Patients response to treatment:   [x]Patient tolerated treatment well with no adverse reactions noted    []Patient limited by fatigue   []Patient limited by pain    []Patient limited by other medical complications   [x]Other: pain remained 0/10 after treatment     Goals:   Progress towards goals:  Goals established on 8/3/21  GOALS:  Short Term Goals: 2 weeks  1. Independent in HEP and progression per patient tolerance, in order to prevent re-injury. []? Progressing: []? Met: []? Not Met: []? Adjusted            2.  Patient will have a decrease in pain to facilitate improvement in movement, function, and ADLs as indicated by Functional (62126)     [] ES () (40864): Medicare Cap total YTD:   [x]N/A    Electronically signed by:  Staci Medrano, PT, MPT, OMT-C [id number]      Note: If patient does not return for scheduled/ recommended follow up visits, this note will serve as a discharge from care along with most recent update on progress.

## 2021-08-10 ENCOUNTER — HOSPITAL ENCOUNTER (OUTPATIENT)
Dept: PHYSICAL THERAPY | Age: 51
Setting detail: THERAPIES SERIES
Discharge: HOME | End: 2021-08-10
Payer: MEDICARE

## 2021-08-10 NOTE — FLOWSHEET NOTE
Physical Therapy  Cancellation/No-show Note  Patient Name:  Brodie Soulier  :  1970   Date:  8/10/2021  Cancels to Date: 1  No-shows to Date: 0    For today's appointment patient:  [x]  Cancelled  []  Rescheduled appointment  []  No-show     Reason given by patient:  []  Patient ill  []  Conflicting appointment  []  No transportation    []  Conflict with work  []  No reason given  [x]  Other:     Comments:  Taking someone to the hospital    Electronically signed Petrona Godoy, [id number]

## 2021-08-12 ENCOUNTER — HOSPITAL ENCOUNTER (OUTPATIENT)
Dept: PHYSICAL THERAPY | Age: 51
Setting detail: THERAPIES SERIES
Discharge: HOME | End: 2021-08-12
Payer: MEDICARE

## 2021-08-12 NOTE — FLOWSHEET NOTE
Physical Therapy  Cancellation/No-show Note  Patient Name:  Ace Lockhart  :  1970   Date:  2021  Cancels to Date: 2  No-shows to Date: 0    For today's appointment patient:  [x]  Cancelled  []  Rescheduled appointment  []  No-show     Reason given by patient:  [x]  Patient ill  []  Conflicting appointment  []  No transportation    []  Conflict with work  []  No reason given  [x]  Other:     Comments:  No feeling well today.     Electronically signed by:  Tre Crook, PT , MPT, OMT-C [id number]

## 2021-08-17 ENCOUNTER — HOSPITAL ENCOUNTER (OUTPATIENT)
Dept: PHYSICAL THERAPY | Age: 51
Setting detail: THERAPIES SERIES
Discharge: HOME | End: 2021-08-17
Payer: MEDICARE

## 2021-08-17 PROCEDURE — 97110 THERAPEUTIC EXERCISES: CPT

## 2021-08-17 NOTE — FLOWSHEET NOTE
[address] [facility] DA[place]E Chavez [address]  Phone [phone]  Fax [phone]    Outpatient Physical Therapy     [x] Daily Treatment Note   [] Progress Note   [] Discharge Note    Date:  8/17/2021    Patient Name:  Airam Carter         YOB: 1970    Medical Diagnosis:  Lumbar radiculopathy, acute (M54.16)  Treatment Diagnosis:  Pelvic/sacral dysfunction, LE weakness, limited ROM, decreased functional status      Onset Date: 7/10/21  Referral Date: 7/13/21  Referring Physician: Satinder Grey MD      Visits Allowed/Insurance/Certification Information:  [place] dual complete, no precert, med nec      Restrictions/Precautions:  none    Plan of care sent to provider:   []NA   [x]Faxed   []Co-signature       Plan of care signed:    [x]NA   []Yes   []No      Progress report will be due (10 Rx or 30 days whichever is less):  1/23/06     Recertification will be due (POC Duration  / 90 days whichever is less): 11/3/21     Visit# / total visits:     Visit # Insurance Allowable Auth Required   In Person 3/16 Med nec []  Yes     [x]  No    Tele Health 0  []  Yes     []  No    Total 3/16       Plan for Next Session:  Review est ex      Subjective:  Patient reports she was ill with tummy bug, water leak that ruined her hardwood floors and she had power outage that ruined her food. She has been working at pulling up her floor and has not done many ex. 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      Pain level: 0/10 currently, and at worst since last visit just muscle soreness from all the work she has been doing  AT EVAL: Patient describes pain to be intermittent across low back radiating down L leg to knee level  Patient reports pain is  1-2/10 pain at present and  8/10 pain at its worst.  Worsened by:  Stooping, bending, lifting, standing >10 min, walking >10 min, sitting >20 min        Objective:       Exercises: Exercises in bold performed in department today. Items not bolded are carried forward from prior visits for continuity of the record. Exercise/Equipment Resistance/Repetitions Issued for HEP   nustep L5, x5 min   Not avail at this time    Prone glut squeeze 6 sec/ x10 x   Prone hip IR/ER x10  x   Prone/supine TA 6 sec x10 x   Prone quad stretch 30 sec/ x2 B x   prone hip ext alt x10 B tighten buttock to decr discomfort on R x   Clam shells x20 B x   sidelying hip abd x10 x   bridges x10 x   Supine TA with march x10 B x   Supine TA with controlled knee fallout x10 B x   Supine piriformis stretch 30 sec/ x2 x   lat rolls with tball x10    reverse crunch with tball x10    Seated HS stretch 30 sec/ x2 x   Standing multifidus handshake 3x10 B x   standing high marches x10 x   standing heel raises   x10 x   standing gastroc stretch 2-3x30\" B x                                 Therapeutic Exercise/Home Exercise Program:   x40 min. Reviewed and progressed exercises as noted above with new handout given. Group Therapy:    0 minutes    Therapeutic Activity:  0 minutes     Gait: 0 minutes  2Neuromuscular Re-Education:  0 minutes      Canalith Repositioning Procedure:  0 minutes    Manual Therapy:  0 minutes   No pain in SI jt just muscle        Modalities: 0 minutes   [] GAME READY (VASO)- for significant edema, swelling, pain control. Functional Outcome Measure:   []NA  Measure Used: oswestry  Date Assessed: 8/3/21  Score: 72%    Assessment/Treatment/Activity Tolerance:    Patients response to treatment:   [x]Patient tolerated treatment well with no adverse reactions noted    []Patient limited by fatigue   []Patient limited by pain    []Patient limited by other medical complications   [x]Other: pain remained 0/10 after treatment     Goals:   Progress towards goals:  Goals established on 8/3/21  GOALS:  Short Term Goals: 2 weeks  1.  Independent in HEP and progression per patient tolerance, in order to prevent re-injury. []? Progressing: []? Met: []? Not Met: []? Adjusted            2. Patient will have a decrease in pain to facilitate improvement in movement, function, and ADLs as indicated by Functional Deficits. []? Progressing: []? Met: []? Not Met: []? Adjusted               Long Term Goals:  8 weeks  1. Disability index score of 20% or less for the oswestry functional questionnaire to assist with reaching prior level of function. []? Progressing: []? Met: []? Not Met: []? Adjusted           2. Patient will demonstrate increased  Lumbar AROM and hamstring flexibility  to WNL to allow for proper joint functioning as indicated by patients Functional Deficits. []? Progressing: []? Met: []? Not Met: []? Adjusted            3. Patient will demonstrate an increase in strength to 4+/5 or greater throughout B LE's to allow for proper functional mobility as indicated by patients Functional Deficits. []? Progressing: []? Met: []? Not Met: []? Adjusted            4. Patient will return to all transfers, work activities, and functional activities without increased symptoms or restriction. []? Progressing: []? Met: []? Not Met: []? Adjusted            5. Patient will have 0-2/10 pain with ADL's.  []? Progressing: []? Met: []? Not Met: []? Adjusted            6. Patient stated goal: Sleep through the night without waking due to pain  []? Progressing: []? Met: []? Not Met: []?  Adjusted    Prognosis: [x]Good   []Fair   []Poor    Patient Requires Follow-up:  [x]Yes  []No    Plan: []Plan of care initiated     [x]Continue per plan of care    [] Alter current plan (see comments)    []Hold pending MD visit []Discharge    Charges:  Timed Code Treatment Minutes: 47   Total Treatment Minutes:  47   [facility] [facility] time for each procedure?:  TE TIME:  NMR TIME:  MANUAL TIME:  UNTIMED MINUTES:         [] EVAL (LOW) 73828 (typically 20 minutes face-to-face)  [] EVAL (MOD) 19144 (typically 30 minutes face-to-face)  [] EVAL (HIGH) 87031

## 2021-08-19 ENCOUNTER — HOSPITAL ENCOUNTER (OUTPATIENT)
Dept: PHYSICAL THERAPY | Age: 51
Setting detail: THERAPIES SERIES
Discharge: HOME | End: 2021-08-19
Payer: MEDICARE

## 2021-08-19 PROCEDURE — 97110 THERAPEUTIC EXERCISES: CPT

## 2021-08-19 PROCEDURE — 97140 MANUAL THERAPY 1/> REGIONS: CPT

## 2021-08-19 NOTE — FLOWSHEET NOTE
[address] [place] [address] [facility] DANAE Chavez [address]  Phone [phone]  Fax [phone]    Outpatient Physical Therapy     [x] Daily Treatment Note   [] Progress Note   [] Discharge Note    Date:  8/19/2021    Patient Name:  Reyna Dunlap         YOB: 1970    Medical Diagnosis:  Lumbar radiculopathy, acute (M54.16)  Treatment Diagnosis:  Pelvic/sacral dysfunction, LE weakness, limited ROM, decreased functional status      Onset Date: 7/10/21  Referral Date: 7/13/21  Referring Physician: Chau Cisneros MD      Visits Allowed/Insurance/Certification Information:  [facility] dual complete, no precert, med nec      Restrictions/Precautions:  none    Plan of care sent to provider:   []NA   [x]Faxed   []Co-signature       Plan of care signed:    [x]NA   []Yes   []No      Progress report will be due (10 Rx or 30 days whichever is less):  7/24/22     Recertification will be due (POC Duration  / 90 days whichever is less): 11/3/21     Visit# / total visits:     Visit # Insurance Allowable Auth Required   In Person 4/16 Med nec []  Yes     [x]  No    Tele Health 0  []  Yes     []  No    Total 4/16       Plan for Next Session:  Add multifidus handshake      Subjective:  Patient reports an increase in her pain since yesterday. She is not sure if her fibromyalgia is getting flared up, as she says it hurts to wear clothes today. She doesn't know how much she is able to tolerate today. She has not been working on her floor the past few days. Only taking care of her parents and pets with limited activity.          Pain level: 5-6/10 currently, and at worst since last visit , achy/soreness in L SI joint and B flank regions  AT EVAL: Patient describes pain to be intermittent across low back radiating down L leg to knee level  Patient reports pain is  1-2/10 pain at present and  8/10 pain at its worst.  Worsened by:  Stooping, bending, lifting, standing >10 min, walking >10 min, sitting >20 min        Objective:       Exercises:      Exercises in bold performed in department today. Items not bolded are carried forward from prior visits for continuity of the record. Exercise/Equipment Resistance/Repetitions Issued for HEP   nustep L3, x5 min       Prone glut squeeze 6 sec/ x10 x   Prone hip IR/ER x10  B x   Prone/supine TA 6 sec x10 x   Prone quad stretch 30 sec/ x2 B x   prone hip ext alt x10 B tighten buttock to decr discomfort on R x   Clam shells x10 B x   sidelying hip abd x10 B x   bridges x10 x   Supine TA with march x10 B x   Supine TA with controlled knee fallout x10 B x   Supine piriformis stretch 30 sec/ x2 x   lat rolls with tball x20 red tball    reverse crunch with tball x20 red tball    Seated HS stretch 30 sec/ x2 x   Standing multifidus handshake 3x10 B x   standing high marches x10 x   standing heel raises   x10 x   standing gastroc stretch 2-3x30\" B x                                 Therapeutic Exercise/Home Exercise Program:   x30 min. Reviewed and progressed exercises as noted above. Group Therapy:    0 minutes    Therapeutic Activity:  0 minutes     Gait: 0 minutes    Neuromuscular Re-Education:  0 minutes      Canalith Repositioning Procedure:  0 minutes    Manual Therapy:  15 minutes    Patient noted to have  L ant pelvic rotation, and L on R sacral torsion  Performed B supine lumbopelvic roll, sidelying lumbar roll, and sidelying MET for sacral torsion. Neutral alignment achieved with manual therapy today. Modalities: 0 minutes   [] GAME READY (VASO)- for significant edema, swelling, pain control.      Functional Outcome Measure:   []NA  Measure Used: oswestry  Date Assessed: 8/3/21  Score: 72%    Assessment/Treatment/Activity Tolerance:    Patients response to treatment:   [x]Patient tolerated treatment well with no adverse reactions noted    []Patient limited by fatigue   []Patient limited by pain    []Patient limited Total Treatment Minutes:  45   [facility] time for each procedure?:  TE TIME:  NMR TIME:  MANUAL TIME:  UNTIMED MINUTES:         [] EVAL (LOW) 73796 (typically 20 minutes face-to-face)  [] EVAL (MOD) 31606 (typically 30 minutes face-to-face)  [] EVAL (HIGH) 43553 (typically 45 minutes face-to-face)  [] RE-EVAL     [x] TD(82995) x 2    [] IONTO  [] NMR (45594) x     [] VASO  [x] Manual (26901) x1     [] Other:  [] TA x      [] Mech Traction (84562)  [] ES(attended) (41913)     [] ES (un) (44574): Medicare Cap total YTD:   [x]N/A    Electronically signed by:  Ailin Kimble, PT, MPT, OMT-C [id number]        Note: If patient does not return for scheduled/ recommended follow up visits, this note will serve as a discharge from care along with most recent update on progress.

## 2021-08-24 ENCOUNTER — HOSPITAL ENCOUNTER (OUTPATIENT)
Dept: PHYSICAL THERAPY | Age: 51
Setting detail: THERAPIES SERIES
Discharge: HOME | End: 2021-08-24
Payer: MEDICARE

## 2021-08-24 PROCEDURE — 97110 THERAPEUTIC EXERCISES: CPT

## 2021-08-24 NOTE — FLOWSHEET NOTE
Exercises in bold performed in department today. Items not bolded are carried forward from prior visits for continuity of the record. Exercise/Equipment Resistance/Repetitions Issued for HEP   nustep L3, x5 min       Prone glut squeeze 6 sec/ x10 x   Prone hip IR/ER x10  B x   Prone/supine TA 6 sec x10 x   Prone quad stretch 30 sec/ x2 B x   prone hip ext alt x10 B tighten buttock to decr discomfort on R x   Clam shells x10 B, red band as tolerated x   sidelying hip abd x10 B x   bridges x10 x   Supine TA with march x10 B, red band x   Supine TA with controlled knee fallout x10 B, red band x   Supine piriformis stretch 30 sec/ x2 x   lat rolls with tball x20 red tball    reverse crunch with tball x20 red tball    Seated HS stretch 30 sec/ x2 x   Standing multifidus handshake 3x10 B x   standing high marches x10 x   standing heel raises   x10 x   standing gastroc stretch 2-3x30\" B x                                Therapeutic Exercise/Home Exercise Program:   x25 min. Reviewed and progressed exercises as noted above. Review of pelvic anatomy. Group Therapy:    0 minutes    Therapeutic Activity:  0 minutes     Gait: 0 minutes    Neuromuscular Re-Education:  0 minutes      Canalith Repositioning Procedure:  0 minutes    Manual Therapy:   minutes    ay. Modalities: 0 minutes   [] GAME READY (VASO)- for significant edema, swelling, pain control. Functional Outcome Measure:   []NA  Measure Used: oswestry  Date Assessed: 8/3/21  Score: 72%    Assessment/Treatment/Activity Tolerance:    Patients response to treatment:   [x]Patient tolerated treatment well with no adverse reactions noted    []Patient limited by fatigue   []Patient limited by pain    []Patient limited by other medical complications   [x]Other: pain decreased to 0/10 after treatment     Goals:   Progress towards goals:  Goals established on 8/3/21  GOALS:  Short Term Goals: 2 weeks  1.  Independent in HEP and progression per patient tolerance, (HIGH) 55316 (typically 45 minutes face-to-face)  [] RE-EVAL     [x] QG(68027) x 2    [] IONTO  [] NMR (39694) x     [] VASO  [] Manual (15739) x     [] Other:  [] TA x      [] Mech Traction (41826)  [] ES(attended) (23412)     [] ES (un) (62505): Medicare Cap total YTD:   [x]N/A    Electronically signed by:  BOBO PETERSEN,[id number]        Note: If patient does not return for scheduled/ recommended follow up visits, this note will serve as a discharge from care along with most recent update on progress.

## 2021-08-26 ENCOUNTER — HOSPITAL ENCOUNTER (OUTPATIENT)
Dept: PHYSICAL THERAPY | Age: 51
Setting detail: THERAPIES SERIES
Discharge: HOME | End: 2021-08-26
Payer: MEDICARE

## 2021-08-26 PROCEDURE — 97110 THERAPEUTIC EXERCISES: CPT

## 2021-08-26 NOTE — FLOWSHEET NOTE
[address] [place] [address] [facility] [place] [address]  Phone [phone]  Fax [phone]    Outpatient Physical Therapy     [x] Daily Treatment Note   [] Progress Note   [x] Discharge Note   8/6/21  Date:  8/26/2021    Patient Name:  Airam Carter         YOB: 1970    Medical Diagnosis:  Lumbar radiculopathy, acute (M54.16)  Treatment Diagnosis:  Pelvic/sacral dysfunction, LE weakness, limited ROM, decreased functional status      Onset Date: 7/10/21  Referral Date: 7/13/21  Referring Physician: Satinder Grey MD      Visits Allowed/Insurance/Certification Information:  [facility] dual complete, no precert, med nec      Restrictions/Precautions:  none    Plan of care sent to provider:   []NA   [x]Faxed   []Co-signature       Plan of care signed:    [x]NA   []Yes   []No      Progress report will be due (10 Rx or 30 days whichever is less):  Last sched visit is 8/26 1/46/65     Recertification will be due (POC Duration  / 90 days whichever is less): 11/3/21     Visit# / total visits:     Visit # Insurance Allowable Auth Required   In Person 6/16 Med nec []  Yes     [x]  No    Tele Health 0  []  Yes     []  No    Total 6/16       Plan for Next Session:  Continue with HEP independently      Subjective:  Patient doing great! She has made 100% improvement with PT treatment. She has joined a gym and plans to continue exercising. She is sleeping through the night without waking due to pain. Performing all of her normal activities without limitations. She feels she is ready for d/c from PT. Performing HEP several times per week.         Pain level: 0/10 currently, and at worst since last visit      AT EVAL: Patient describes pain to be intermittent across low back radiating down L leg to knee level  Patient reports pain is  1-2/10 pain at present and  8/10 pain at its worst.  Worsened by:  Stooping, bending, lifting, standing >10 min, walking >10 min, sitting >20 min        Objective:     AROM:  Lumbar flex 115, ext 30, SB 27 B,  ROT L 60, R 57  MMT:  5/5 throughout B LE's,   HS flexibility -15 deg B  Gait:  WNL  Palpation:  Non-tender, neutral alignment      Exercises:      Exercises in bold performed in department today. Items not bolded are carried forward from prior visits for continuity of the record. Exercise/Equipment Resistance/Repetitions Issued for HEP   nustep L5, x5 min       Prone glut squeeze 6 sec/ x10 x   Prone hip IR/ER x10  B x   Prone/supine TA 6 sec x10 x   Prone quad stretch 30 sec/ x2 B x   prone hip ext alt x10 B tighten buttock to decr discomfort on R x   Clam shells x10 B, red band as tolerated x   sidelying hip abd x10 B x   bridges x10 x   Supine TA with march x10 B, red band x   Supine TA with controlled knee fallout x10 B, red band x   Supine piriformis stretch 30 sec/ x2 x   lat rolls with tball x20 red tball    reverse crunch with tball x20 red tball    Seated HS stretch 30 sec/ x2 x   Standing multifidus handshake 3x10 B x   standing high marches x10 x   standing heel raises   x10 x   standing gastroc stretch 2-3x30\" B x                                Therapeutic Exercise/Home Exercise Program:   x45 min. Reviewed exercises as noted above. Patient instructed in proper body mechanics for lifting and other household chores/functional tasks. Handouts given. Assessment done for d/c note. Group Therapy:    0 minutes    Therapeutic Activity:  0 minutes     Gait: 0 minutes    Neuromuscular Re-Education:  0 minutes      Canalith Repositioning Procedure:  0 minutes    Manual Therapy: 0 minutes - neutral alignment   ay. Modalities: 0 minutes   [] GAME READY (VASO)- for significant edema, swelling, pain control. Functional Outcome Measure:   []NA  Measure Used: oswestry  Date Assessed: 8/3/21  8/26/21  Score: 72%    0%    Assessment/Treatment/Activity Tolerance:  Patient made great progress with PT treatment. AROM, strength, alignment, gait WNL. NO reports of pain, and full return to prior level of activity without limitations noted. All goals MET. NO further PT needed at this time. D/c from PT. Patients response to treatment:   [x]Patient tolerated treatment well with no adverse reactions noted    []Patient limited by fatigue   []Patient limited by pain    []Patient limited by other medical complications   [x]Other: pain decreased to 0/10 after treatment     Goals:   Progress towards goals: All goals MET  GOALS:  Short Term Goals: 2 weeks  1. Independent in HEP and progression per patient tolerance, in order to prevent re-injury. []? Progressing: [x]? Met: []? Not Met: []? Adjusted            2. Patient will have a decrease in pain to facilitate improvement in movement, function, and ADLs as indicated by Functional Deficits. []? Progressing: [x]? Met: []? Not Met: []? Adjusted               Long Term Goals:  8 weeks  1. Disability index score of 20% or less for the oswestry functional questionnaire to assist with reaching prior level of function. []? Progressing: [x]? Met: []? Not Met: []? Adjusted           2. Patient will demonstrate increased  Lumbar AROM and hamstring flexibility  to WNL to allow for proper joint functioning as indicated by patients Functional Deficits. []? Progressing: [x]? Met: []? Not Met: []? Adjusted            3. Patient will demonstrate an increase in strength to 4+/5 or greater throughout B LE's to allow for proper functional mobility as indicated by patients Functional Deficits. []? Progressing: [x]? Met: []? Not Met: []? Adjusted            4. Patient will return to all transfers, work activities, and functional activities without increased symptoms or restriction. []? Progressing: [x]? Met: []? Not Met: []? Adjusted            5. Patient will have 0-2/10 pain with ADL's.  []? Progressing: [x]? Met: []? Not Met: []? Adjusted            6.  Patient stated goal: Sleep through the night without waking due to pain  []? Progressing: [x]? Met: []? Not Met: []? Adjusted    Prognosis: [x]Good   []Fair   []Poor    Patient Requires Follow-up:  []Yes  [x]No    Plan: []Plan of care initiated     []Continue per plan of care    [] Alter current plan (see comments)    []Hold pending MD visit [x]Discharge    Charges:  Timed Code Treatment Minutes: 45   Total Treatment Minutes:  45   6768 [facility] time for each procedure?:  TE TIME:  NMR TIME:  MANUAL TIME:  UNTIMED MINUTES:         [] EVAL (LOW) 38364 (typically 20 minutes face-to-face)  [] EVAL (MOD) 88334 (typically 30 minutes face-to-face)  [] EVAL (HIGH) 99632 (typically 45 minutes face-to-face)  [] RE-EVAL     [x] HR(56485) x 3    [] IONTO  [] NMR (38904) x     [] VASO  [] Manual (62020) x     [] Other:  [] TA x      [] Mech Traction (31026)  [] ES(attended) (87400)     [] ES (un) (55540): Medicare Cap total YTD:   [x]N/A    Electronically signed by:  Divina Mendoza, PT,MPT, OMT-C [id number]          Note: If patient does not return for scheduled/ recommended follow up visits, this note will serve as a discharge from care along with most recent update on progress.

## 2021-12-05 ENCOUNTER — HOSPITAL ENCOUNTER (EMERGENCY)
Age: 51
Discharge: HOME | End: 2021-12-05
Attending: EMERGENCY MEDICINE
Payer: MEDICARE

## 2021-12-05 VITALS
HEART RATE: 78 BPM | WEIGHT: 147 LBS | DIASTOLIC BLOOD PRESSURE: 86 MMHG | OXYGEN SATURATION: 95 % | BODY MASS INDEX: 24.49 KG/M2 | RESPIRATION RATE: 20 BRPM | HEIGHT: 65 IN | SYSTOLIC BLOOD PRESSURE: 154 MMHG | TEMPERATURE: 97.8 F

## 2021-12-05 DIAGNOSIS — J06.9 UPPER RESPIRATORY TRACT INFECTION, UNSPECIFIED TYPE: Primary | ICD-10-CM

## 2021-12-05 DIAGNOSIS — J20.9 ACUTE BRONCHITIS, UNSPECIFIED ORGANISM: ICD-10-CM

## 2021-12-05 PROCEDURE — 99284 EMERGENCY DEPT VISIT MOD MDM: CPT

## 2021-12-05 RX ORDER — ALBUTEROL SULFATE 90 UG/1
2 AEROSOL, METERED RESPIRATORY (INHALATION) EVERY 4 HOURS PRN
Qty: 1 EACH | Refills: 0 | Status: SHIPPED | OUTPATIENT
Start: 2021-12-05 | End: 2022-01-04

## 2021-12-05 RX ORDER — MONTELUKAST SODIUM 10 MG/1
TABLET ORAL
Status: ON HOLD | COMMUNITY
Start: 2021-03-09 | End: 2022-09-02

## 2021-12-05 RX ORDER — PREDNISONE 10 MG/1
10 TABLET ORAL SEE ADMIN INSTRUCTIONS
Qty: 30 TABLET | Refills: 0 | Status: ON HOLD | OUTPATIENT
Start: 2021-12-06 | End: 2022-09-02 | Stop reason: HOSPADM

## 2021-12-05 RX ORDER — GUAIFENESIN AND DEXTROMETHORPHAN HYDROBROMIDE 600; 30 MG/1; MG/1
1 TABLET, EXTENDED RELEASE ORAL 3 TIMES DAILY PRN
Qty: 30 TABLET | Refills: 0 | Status: SHIPPED | OUTPATIENT
Start: 2021-12-05 | End: 2021-12-15

## 2021-12-05 RX ORDER — DOXYCYCLINE HYCLATE 100 MG/1
100 CAPSULE ORAL 2 TIMES DAILY
Qty: 20 CAPSULE | Refills: 0 | Status: SHIPPED | OUTPATIENT
Start: 2021-12-05 | End: 2021-12-15

## 2021-12-05 ASSESSMENT — ENCOUNTER SYMPTOMS
CHEST TIGHTNESS: 0
COUGH: 1
SHORTNESS OF BREATH: 0
WHEEZING: 1

## 2021-12-05 NOTE — ED TRIAGE NOTES
Chief Complaint   Patient presents with    URI     cough, congestion, body aches, feeling of sob since wednesday

## 2021-12-05 NOTE — Clinical Note
Rubina Paul was seen and treated in our emergency department on 12/5/2021. She may return to work on 12/08/2021. If you have any questions or concerns, please don't hesitate to call.       Sia Nur MD

## 2021-12-05 NOTE — DISCHARGE INSTR - COC
Continuity of Care Form    Patient Name: Leslee Ford   :  1970  MRN:  [id number]    Admit date:  2021  Discharge date:  ***    Code Status Order: Prior   Advance Directives:      Admitting Physician:  No admitting provider for patient encounter.   PCP: Reese Mckinney MD    Discharging Nurse: [facility] Unit/Room#:   Discharging Unit Phone Number: ***    Emergency Contact:   Extended Emergency Contact Information  Primary Emergency Contact: Bon Glover  Address: [address] [place] [address] Phone: [phone]  Mobile Phone: [phone]  Relation: Other    Past Surgical History:  Past Surgical History:   Procedure Laterality Date    BRAIN SURGERY      PT STATED DRILLED HOLE TO RELIEVE BLOOD FROM HEMORRHAGE    CHOLECYSTECTOMY  01/15/2018     LAPAROSCOPIC CHOLECYSTECTOMY              ENDOMETRIAL ABLATION      TONSILLECTOMY      TUBAL LIGATION         Immunization History:   Immunization History   Administered Date(s) Administered    COVID-19, Pfizer, PF, 30mcg/0.3mL 2021, 2021    Influenza 2013    Influenza Virus Vaccine 2014, 2015, 10/20/2017    Pneumococcal Polysaccharide (Kppubyzqf12) 2014       Active Problems:  Patient Active Problem List   Diagnosis Code    Gastritis K29.70    Anxiety F41.9    Nicotine dependence F17.200    OA (osteoarthritis) M19.90    Alcohol abuse F10.10    Overactive bladder N32.81    Insomnia G47.00    Cervical radiculopathy, acute M54.12    Chronic lumbar pain M54.50, G89.29    Migraine with aura and without status migrainosus, not intractable G43.109    Onychomycosis of toenail B35.1       Isolation/Infection:   Isolation            No Isolation          Patient Infection Status       None to display            Nurse Assessment:  Last Vital Signs: BP (!) 154/86   Pulse 78   Temp 97.8 °F (36.6 °C) (Oral)   Resp 20   Ht 5' 5\" (1.651 m)   Wt 147 lb (66.7 kg) SpO2 95%   BMI 24.46 kg/m²     Last documented pain score (0-10 scale):    Last Weight:   Wt Readings from Last 1 Encounters:   21 147 lb (66.7 kg)     Mental Status:  {IP PT MENTAL STATUS:}    IV Access:  { NIKOS IV ACCESS:733743309}    Nursing Mobility/ADLs:  Walking   {CHP DME FGNK:749651838}  Transfer  {CHP DME LMPV:743261046}  Bathing  {CHP DME ZWJW:678460016}  Dressing  {CHP DME NCUN:934553302}  Toileting  {CHP DME MMIV:841790810}  Feeding  {CHP DME BIGM:372304271}  Med Admin  {CHP DME XGPM:813659140}  Med Delivery   { NIKOS MED Delivery:161834724}    Wound Care Documentation and Therapy:        Elimination:  Continence: Bowel: {YES / HH:28894}  Bladder: {YES / EQ:30193}  Urinary Catheter: {Urinary Catheter:773329943}   Colostomy/Ileostomy/Ileal Conduit: {YES / LW:25425}       Date of Last BM: ***  No intake or output data in the 24 hours ending 21 1000  No intake/output data recorded.     Safety Concerns:     [address] Safety Concerns:199267066}    Impairments/Disabilities:      [address] Impairments/Disabilities:086650793}    Nutrition Therapy:  Current Nutrition Therapy:   [address] Diet List:696604630}    Routes of Feeding: {[facility] DME Other Feedings:331254215}  Liquids: {Slp liquid thickness:83487}  Daily Fluid Restriction: {CHP DME Yes amt example:204534952}  Last Modified Barium Swallow with Video (Video Swallowing Test): {Done Not Done QIYT:149307567}    Treatments at the Time of Hospital Discharge:   Respiratory Treatments: ***  Oxygen Therapy:  {Therapy; copd oxygen:63055}  Ventilator:    { CC Vent SVDJ:973174368}    Rehab Therapies: {THERAPEUTIC INTERVENTION:5912957773}  Weight Bearing Status/Restrictions: [address]  Weight Bearin}  Other Medical Equipment (for information only, NOT a DME order):  {EQUIPMENT:505892834}  Other Treatments: ***    Patient's personal belongings (please select all that are sent with patient):  {MARINA DME Belongings:196044377}    RN SIGNATURE: {Esignature:360065931}    CASE MANAGEMENT/SOCIAL WORK SECTION    Inpatient Status Date: ***    Readmission Risk Assessment Score:  Readmission Risk              Risk of Unplanned Readmission:  0           Discharging to Facility/ Agency   Name:   Address:  Phone:  Fax:    Dialysis Facility (if applicable)   Name:  Address:  Dialysis Schedule:  Phone:  Fax:    / signature: {Esignature:520975294}    PHYSICIAN SECTION    Prognosis: {Prognosis:8676365727}    Condition at Discharge: [address] [place] [address] Patient Condition:574945555}    Rehab Potential (if transferring to Rehab): {Prognosis:3171850500}    Recommended Labs or Other Treatments After Discharge: ***    Physician Certification: I certify the above information and transfer of Zoe Rubalcava  is necessary for the continuing treatment of the diagnosis listed and that she requires {Admit to Appropriate Level of Care:40714} for {GREATER/LESS:759960109} 30 days.      Update Admission H&P: {CHP DME Changes in FEYAR:386984189}    PHYSICIAN SIGNATURE:  {Esignature:685773780}

## 2021-12-05 NOTE — ED PROVIDER NOTES
[address] ENCOUNTER      Pt Name: Leah Francois  MRN: [id number]  [place] 1970  Date of evaluation: 12/5/2021  Provider: Indira Cuello MD    [address] [place] [address]       Chief Complaint   Patient presents with    URI     cough, congestion, body aches, feeling of sob since wednesday         HISTORY OF PRESENT ILLNESS   (Location/Symptom, Timing/Onset, Context/Setting, Quality, Duration, Modifying Factors, Severity)  Note limiting factors. Leah Francois is a 46 y.o. female who presents to the emergency department     This patient presents emergency department history of apparently complaining of cough congestion. Patient states that she does live in a house for many people right now have cold symptoms she did test herself for Covid 2 days ago which was negative. She says that she has had Covid twice. She denies any generalized aching or severely high fever she is a smoker unfortunately  Patient denies shortness of breath denies chest pain patient is coughing up. Then congested. Is her main complaint    The history is provided by the patient. Nursing Notes were reviewed. REVIEW OF SYSTEMS    (2-9 systems for level 4, 10 or more for level 5)     Review of Systems   Constitutional: Positive for activity change, appetite change, chills and fatigue. Negative for fever. HENT: Positive for congestion. Respiratory: Positive for cough and wheezing. Negative for chest tightness and shortness of breath. Cardiovascular: Negative for chest pain and leg swelling. Neurological: Negative for speech difficulty. All other systems reviewed and are negative. Except as noted above the remainder of the review of systems was reviewed and negative.        PAST MEDICAL HISTORY     Past Medical History:   Diagnosis Date    Alcohol abuse 2014    DDD (degenerative disc disease), lumbosacral     Depression     Gastritis     Heart murmur     Heart palpitations     Migraine     Osteoarthritis     Overactive bladder     SCIATIC NERVE DISEASE     Subarachnoid hemorrhage (HCC) 2007         SURGICAL HISTORY       Past Surgical History:   Procedure Laterality Date    BRAIN SURGERY      PT STATED DRILLED HOLE TO RELIEVE BLOOD FROM HEMORRHAGE    CHOLECYSTECTOMY  01/15/2018     LAPAROSCOPIC CHOLECYSTECTOMY              ENDOMETRIAL ABLATION      TONSILLECTOMY      TUBAL LIGATION           CURRENT MEDICATIONS       Previous Medications    BUPROPION HCL (WELLBUTRIN PO)    Take by mouth Daily    ELETRIPTAN (RELPAX) 40 MG TABLET    TAKE 1 TABLET BY MOUTH AS NEEDED MAY REPEAT IN 2 HOURS IF NECESSARY    LYRICA 75 MG CAPSULE    TAKE 1 CAPSULE BY MOUTH TWICE A DAY    METHYLPREDNISOLONE (MEDROL, REX,) 4 MG TABLET    Take by mouth.     MONTELUKAST (SINGULAIR) 10 MG TABLET    Take by mouth    OMEGA-3 FATTY ACIDS (FISH OIL PO)    Take by mouth    RIZATRIPTAN (MAXALT) 10 MG TABLET    TAKE 1 TABLET BY MOUTH ONCE AS NEEDED FOR MIGRAINE MAY REPEAT IN 1-2 HOURS IF NEEDED    VITAMIN B-2 (RIBOFLAVIN) 100 MG TABS TABLET    Take 100 mg by mouth daily       ALLERGIES     Ibuprofen, Vicodin [hydrocodone-acetaminophen], and Tramadol    FAMILY HISTORY       Family History   Problem Relation Age of Onset    Arthritis Mother     Asthma Mother     Depression Mother     Diabetes Mother     Hearing Loss Mother     Heart Disease Mother     High Blood Pressure Mother     High Cholesterol Mother     Mental Illness Mother     Stroke Mother     Vision Loss Mother     Arthritis Father     Hearing Loss Father     High Blood Pressure Father     High Cholesterol Father     Substance Abuse Father     Vision Loss Father     Arthritis Sister     Depression Sister     High Cholesterol Sister     Mental Retardation Sister     Miscarriages / [place] Sister           SOCIAL HISTORY       Social History     Socioeconomic History    Marital status:      Spouse name: None  Number of children: None    Years of education: None    Highest education level: None   Occupational History    None   Tobacco Use    Smoking status: Current Every Day Smoker     Packs/day: 1.00     Types: Cigarettes     Last attempt to quit: 2016     Years since quittin.1    Smokeless tobacco: Never Used   Substance and Sexual Activity    Alcohol use: Yes     Comment: Occ.  Drug use: No    Sexual activity: None   Other Topics Concern    None   Social History Narrative    None     Social Determinants of Health     Financial Resource Strain:     Difficulty of Paying Living Expenses: Not on file   Food Insecurity:     Worried About Running Out of Food in the Last Year: Not on file    Michelle of Food in the Last Year: Not on file   Transportation Needs:     Lack of Transportation (Medical): Not on file    Lack of Transportation (Non-Medical):  Not on file   Physical Activity:     Days of Exercise per Week: Not on file    Minutes of Exercise per Session: Not on file   Stress:     Feeling of Stress : Not on file   Social Connections:     Frequency of Communication with Friends and Family: Not on file    Frequency of Social Gatherings with Friends and Family: Not on file    Attends Sikhism Services: Not on file    Active Member of [address] [place] [address] [employer] or Organizations: Not on file    Attends Club or Organization Meetings: Not on file    Marital Status: Not on file   Intimate Partner Violence:     Fear of Current or Ex-Partner: Not on file    Emotionally Abused: Not on file    Physically Abused: Not on file    Sexually Abused: Not on file   Housing Stability:     Unable to Pay for Housing in the Last Year: Not on file    Number of [place] in the Last Year: Not on file    Unstable Housing in the Last Year: Not on file       SCREENINGS               PHYSICAL EXAM    (up to 7 for level 4, 8 or more for level 5)     ED Triage Vitals [21 0935]   BP Temp Temp Source Pulse Resp SpO2 Height Weight   (!) 154/86 97.8 °F (36.6 °C) Oral 78 20 95 % 5' 5\" (1.651 m) 147 lb (66.7 kg)       Physical Exam  Vitals and nursing note reviewed. Constitutional:       General: She is not in acute distress. Appearance: Normal appearance. She is normal weight. She is not ill-appearing. HENT:      Head: Normocephalic. Right Ear: Tympanic membrane, ear canal and external ear normal.      Left Ear: Tympanic membrane and external ear normal.      Nose: Congestion present. Eyes:      Extraocular Movements: Extraocular movements intact. Pupils: Pupils are equal, round, and reactive to light. Cardiovascular:      Rate and Rhythm: Normal rate and regular rhythm. Pulses: Normal pulses. Heart sounds: Normal heart sounds. Pulmonary:      Breath sounds: Wheezing and rhonchi present. No rales. Chest:      Chest wall: No tenderness. Abdominal:      General: Abdomen is flat. Musculoskeletal:         General: Normal range of motion. Skin:     General: Skin is warm. Neurological:      General: No focal deficit present. Mental Status: She is alert and oriented to person, place, and time. DIAGNOSTIC RESULTS     EKG: All EKG's are interpreted by the Emergency Department Physician who either signs or Co-signs this chart in the absence of a cardiologist.        RADIOLOGY:   Non-plain film images such as CT, Ultrasound and MRI are read by the radiologist. Plain radiographic images are visualized and preliminarily interpreted by the emergency physician with the below findings:        Interpretation per the Radiologist below, if available at the time of this note:    No orders to display           LABS:  No results found for this visit on 12/05/21.          EMERGENCY DEPARTMENT COURSE and DIFFERENTIAL DIAGNOSIS/MDM:     Vitals:    12/05/21 0935   BP: (!) 154/86   Pulse: 78   Resp: 20   Temp: 97.8 °F (36.6 °C)   TempSrc: Oral   SpO2: 95%   Weight: 147 lb (66.7 kg)   Height: 5' 5\" (1.651 m)           MDM      REASSESSMENT          CRITICAL CARE TIME     CONSULTS:  None      PROCEDURES:     Procedures    MEDICATIONS GIVEN THIS VISIT:  Medications - No data to display     FINAL IMPRESSION      1. Upper respiratory tract infection, unspecified type    2. Acute bronchitis, unspecified organism            DISPOSITION/PLAN   DISPOSITION Decision To Discharge 12/05/2021 09:58:52 AM      PATIENT REFERRED TO:  No follow-up provider specified. DISCHARGE MEDICATIONS:  New Prescriptions    ALBUTEROL SULFATE  (90 BASE) MCG/ACT INHALER    Inhale 2 puffs into the lungs every 4 hours as needed for Wheezing or Shortness of Breath (Space out to every 6 hours as symptoms improve)    DEXTROMETHORPHAN-GUAIFENESIN (MUCINEX DM)  MG TB12    Take 1 tablet by mouth 3 times daily as needed (Cough/congestion)    DOXYCYCLINE HYCLATE (VIBRAMYCIN) 100 MG CAPSULE    Take 1 capsule by mouth 2 times daily for 10 days    PREDNISONE (DELTASONE) 10 MG TABLET    Take 1 tablet by mouth See Admin Instructions Take 4 tablets ×4 days  Take 3 tablets ×3 days  Take 2 tablets ×2 days  Take 1 tablet ×1 day       Controlled Substances Monitoring  RX Monitoring 11/12/2018   Attestation The Prescription Monitoring Report for this patient was reviewed today. Periodic Controlled Substance Monitoring No signs of potential drug abuse or diversion identified. (Please note that portions of this note were completed with a voice recognition program.  Efforts were made to edit the dictations but occasionally words are mis-transcribed.)    Patient was advised to return to the Emergency Department if there was any worsening.     Indira Cuello MD (electronically signed)  Attending Emergency Physician         Lico Rich MD  12/05/21 7682

## 2021-12-05 NOTE — ED NOTES
Discharge instructions reviewed with Ms. Glover. She verbalized understanding. Copy of discharge instructions and prescriptions given. Ms. Brett Prakash was discharged to home in good condition per personal vehicle. She exited the ED without difficulty.         Mandy Schmid RN  12/05/21 1011

## 2022-01-21 DIAGNOSIS — M25.511 BILATERAL SHOULDER PAIN, UNSPECIFIED CHRONICITY: ICD-10-CM

## 2022-01-21 DIAGNOSIS — M25.512 BILATERAL SHOULDER PAIN, UNSPECIFIED CHRONICITY: ICD-10-CM

## 2022-01-21 DIAGNOSIS — M54.2 NECK PAIN: Primary | ICD-10-CM

## 2022-01-25 ENCOUNTER — HOSPITAL ENCOUNTER (OUTPATIENT)
Dept: GENERAL RADIOLOGY | Age: 52
Discharge: HOME | End: 2022-01-25
Payer: MEDICARE

## 2022-01-25 ENCOUNTER — HOSPITAL ENCOUNTER (OUTPATIENT)
Age: 52
Discharge: HOME | End: 2022-01-25
Payer: MEDICARE

## 2022-01-25 DIAGNOSIS — M25.512 BILATERAL SHOULDER PAIN, UNSPECIFIED CHRONICITY: ICD-10-CM

## 2022-01-25 DIAGNOSIS — M54.2 NECK PAIN: ICD-10-CM

## 2022-01-25 DIAGNOSIS — M25.511 BILATERAL SHOULDER PAIN, UNSPECIFIED CHRONICITY: ICD-10-CM

## 2022-01-25 PROCEDURE — 73030 X-RAY EXAM OF SHOULDER: CPT

## 2022-01-25 PROCEDURE — 72040 X-RAY EXAM NECK SPINE 2-3 VW: CPT

## 2022-01-25 PROCEDURE — 72070 X-RAY EXAM THORAC SPINE 2VWS: CPT

## 2022-07-15 ENCOUNTER — HOSPITAL ENCOUNTER (OUTPATIENT)
Dept: MAMMOGRAPHY | Age: 52
Discharge: HOME | End: 2022-07-15
Payer: MEDICARE

## 2022-07-15 DIAGNOSIS — Z12.39 SCREENING BREAST EXAMINATION: ICD-10-CM

## 2022-07-15 PROCEDURE — 77067 SCR MAMMO BI INCL CAD: CPT

## 2022-07-16 ENCOUNTER — TELEPHONE (OUTPATIENT)
Dept: MAMMOGRAPHY | Age: 52
End: 2022-07-16

## 2022-08-31 ENCOUNTER — HOSPITAL ENCOUNTER (EMERGENCY)
Age: 52
Discharge: ANOTHER HOSPITAL | End: 2022-09-01
Attending: STUDENT IN AN ORGANIZED HEALTH CARE EDUCATION/TRAINING PROGRAM
Payer: MEDICARE

## 2022-08-31 ENCOUNTER — APPOINTMENT (OUTPATIENT)
Dept: MRI IMAGING | Age: 52
End: 2022-08-31
Payer: MEDICARE

## 2022-08-31 DIAGNOSIS — R29.898 RIGHT LEG WEAKNESS: ICD-10-CM

## 2022-08-31 DIAGNOSIS — R20.0 NUMBNESS IN RIGHT LEG: ICD-10-CM

## 2022-08-31 DIAGNOSIS — M54.50 MIDLINE LOW BACK PAIN, UNSPECIFIED CHRONICITY, UNSPECIFIED WHETHER SCIATICA PRESENT: Primary | ICD-10-CM

## 2022-08-31 LAB
A/G RATIO: 1.7 (ref 1.1–2.2)
ALBUMIN SERPL-MCNC: 4.6 G/DL (ref 3.4–5)
ALP BLD-CCNC: 77 U/L (ref 40–129)
ALT SERPL-CCNC: 16 U/L (ref 10–40)
ANION GAP SERPL CALCULATED.3IONS-SCNC: 10 MMOL/L (ref 3–16)
AST SERPL-CCNC: 17 U/L (ref 15–37)
BACTERIA: ABNORMAL /HPF
BASOPHILS ABSOLUTE: 0.3 K/UL (ref 0–0.2)
BASOPHILS RELATIVE PERCENT: 4.1 %
BILIRUB SERPL-MCNC: <0.2 MG/DL (ref 0–1)
BILIRUBIN URINE: NEGATIVE
BLOOD, URINE: NEGATIVE
BUN BLDV-MCNC: 7 MG/DL (ref 7–20)
CALCIUM SERPL-MCNC: 9.8 MG/DL (ref 8.3–10.6)
CHLORIDE BLD-SCNC: 105 MMOL/L (ref 99–110)
CLARITY: CLEAR
CO2: 27 MMOL/L (ref 21–32)
COLOR: YELLOW
CREAT SERPL-MCNC: 0.7 MG/DL (ref 0.6–1.1)
EOSINOPHILS ABSOLUTE: 0.2 K/UL (ref 0–0.6)
EOSINOPHILS RELATIVE PERCENT: 2.1 %
EPITHELIAL CELLS, UA: ABNORMAL /HPF (ref 0–5)
GFR AFRICAN AMERICAN: >60
GFR NON-AFRICAN AMERICAN: >60
GLUCOSE BLD-MCNC: 94 MG/DL (ref 70–99)
GLUCOSE URINE: NEGATIVE MG/DL
HCT VFR BLD CALC: 40.5 % (ref 36–48)
HEMOGLOBIN: 13.5 G/DL (ref 12–16)
KETONES, URINE: NEGATIVE MG/DL
LEUKOCYTE ESTERASE, URINE: NEGATIVE
LYMPHOCYTES ABSOLUTE: 3.2 K/UL (ref 1–5.1)
LYMPHOCYTES RELATIVE PERCENT: 44.2 %
MCH RBC QN AUTO: 31 PG (ref 26–34)
MCHC RBC AUTO-ENTMCNC: 33.2 G/DL (ref 31–36)
MCV RBC AUTO: 93.4 FL (ref 80–100)
MICROSCOPIC EXAMINATION: ABNORMAL
MONOCYTES ABSOLUTE: 0.3 K/UL (ref 0–1.3)
MONOCYTES RELATIVE PERCENT: 3.7 %
MUCUS: ABNORMAL /LPF
NEUTROPHILS ABSOLUTE: 3.3 K/UL (ref 1.7–7.7)
NEUTROPHILS RELATIVE PERCENT: 45.9 %
NITRITE, URINE: NEGATIVE
PDW BLD-RTO: 14.8 % (ref 12.4–15.4)
PH UA: 6 (ref 5–8)
PLATELET # BLD: 268 K/UL (ref 135–450)
PMV BLD AUTO: 7.6 FL (ref 5–10.5)
POTASSIUM SERPL-SCNC: 4 MMOL/L (ref 3.5–5.1)
PROTEIN UA: NEGATIVE MG/DL
RBC # BLD: 4.34 M/UL (ref 4–5.2)
RBC UA: ABNORMAL /HPF (ref 0–4)
SODIUM BLD-SCNC: 142 MMOL/L (ref 136–145)
SPECIFIC GRAVITY UA: <=1.005 (ref 1–1.03)
TOTAL PROTEIN: 7.3 G/DL (ref 6.4–8.2)
URINE TYPE: ABNORMAL
UROBILINOGEN, URINE: 0.2 E.U./DL
WBC # BLD: 7.2 K/UL (ref 4–11)
WBC UA: ABNORMAL /HPF (ref 0–5)

## 2022-08-31 PROCEDURE — 51798 US URINE CAPACITY MEASURE: CPT

## 2022-08-31 PROCEDURE — 99285 EMERGENCY DEPT VISIT HI MDM: CPT

## 2022-08-31 PROCEDURE — 6360000002 HC RX W HCPCS: Performed by: NURSE PRACTITIONER

## 2022-08-31 PROCEDURE — 96375 TX/PRO/DX INJ NEW DRUG ADDON: CPT

## 2022-08-31 PROCEDURE — 6360000004 HC RX CONTRAST MEDICATION: Performed by: NURSE PRACTITIONER

## 2022-08-31 PROCEDURE — 80053 COMPREHEN METABOLIC PANEL: CPT

## 2022-08-31 PROCEDURE — 96376 TX/PRO/DX INJ SAME DRUG ADON: CPT

## 2022-08-31 PROCEDURE — 96374 THER/PROPH/DIAG INJ IV PUSH: CPT

## 2022-08-31 PROCEDURE — 81001 URINALYSIS AUTO W/SCOPE: CPT

## 2022-08-31 PROCEDURE — 85025 COMPLETE CBC W/AUTO DIFF WBC: CPT

## 2022-08-31 PROCEDURE — 72158 MRI LUMBAR SPINE W/O & W/DYE: CPT

## 2022-08-31 PROCEDURE — A9579 GAD-BASE MR CONTRAST NOS,1ML: HCPCS | Performed by: NURSE PRACTITIONER

## 2022-08-31 RX ORDER — PROMETHAZINE HYDROCHLORIDE 25 MG/1
12.5 TABLET ORAL ONCE
Status: DISCONTINUED | OUTPATIENT
Start: 2022-08-31 | End: 2022-08-31

## 2022-08-31 RX ORDER — MORPHINE SULFATE 4 MG/ML
4 INJECTION, SOLUTION INTRAMUSCULAR; INTRAVENOUS ONCE
Status: COMPLETED | OUTPATIENT
Start: 2022-08-31 | End: 2022-08-31

## 2022-08-31 RX ORDER — ONDANSETRON 2 MG/ML
4 INJECTION INTRAMUSCULAR; INTRAVENOUS ONCE
Status: COMPLETED | OUTPATIENT
Start: 2022-08-31 | End: 2022-08-31

## 2022-08-31 RX ORDER — MORPHINE SULFATE 4 MG/ML
4 INJECTION, SOLUTION INTRAMUSCULAR; INTRAVENOUS ONCE
Status: COMPLETED | OUTPATIENT
Start: 2022-09-01 | End: 2022-09-01

## 2022-08-31 RX ORDER — HYDROCODONE BITARTRATE AND ACETAMINOPHEN 5; 325 MG/1; MG/1
1 TABLET ORAL ONCE
Status: DISCONTINUED | OUTPATIENT
Start: 2022-08-31 | End: 2022-08-31

## 2022-08-31 RX ADMIN — GADOTERIDOL 10 ML: 279.3 INJECTION, SOLUTION INTRAVENOUS at 21:59

## 2022-08-31 RX ADMIN — ONDANSETRON HYDROCHLORIDE 4 MG: 2 INJECTION, SOLUTION INTRAMUSCULAR; INTRAVENOUS at 19:41

## 2022-08-31 RX ADMIN — MORPHINE SULFATE 4 MG: 4 INJECTION, SOLUTION INTRAMUSCULAR; INTRAVENOUS at 19:41

## 2022-08-31 ASSESSMENT — ENCOUNTER SYMPTOMS
VOMITING: 0
RHINORRHEA: 0
NAUSEA: 0
BLOOD IN STOOL: 0
DIARRHEA: 0
BACK PAIN: 1
EYE PAIN: 0
COUGH: 0
ABDOMINAL PAIN: 0
SORE THROAT: 0
SHORTNESS OF BREATH: 0

## 2022-08-31 ASSESSMENT — PAIN DESCRIPTION - LOCATION: LOCATION: BUTTOCKS

## 2022-08-31 ASSESSMENT — PAIN SCALES - GENERAL
PAINLEVEL_OUTOF10: 8
PAINLEVEL_OUTOF10: 3

## 2022-08-31 ASSESSMENT — PAIN - FUNCTIONAL ASSESSMENT: PAIN_FUNCTIONAL_ASSESSMENT: 0-10

## 2022-08-31 NOTE — ED PROVIDER NOTES
[address] ED  EMERGENCY DEPARTMENT ENCOUNTER        Pt Name: Varun Smith  MRN: [id number]  [place] 1970  Date of evaluation: 8/31/2022  Provider: LV Barry - CNP  PCP: Sunil Krishnan MD  Note Started: 7:46 PM EDT       I have seen and evaluated this patient with my supervising physician Sarah Braun MD.      [address]       Chief Complaint   Patient presents with    Back Pain     Pt states Monday sciatic pain into right leg causing it to go numb, had PCP visit josep Guillen yesterday he ordered xray and is trying to get MRI approved by ins. Pt states pain starts at top or right \"butt cheek\" and goes all the way down. Pt reports when she coughs it shoots lighting through her         HISTORY OF PRESENT ILLNESS   (Location/Symptom, Timing/Onset, Context/Setting, Quality, Duration, Modifying Factors, Severity)  Note limiting factors. Chief Complaint: Right leg weakness and numbness    Varun Smith is a 46 y.o. female who presents to the emergency department symptoms of low back pain radiates down the right leg but has had changes where she now feels weak and numb in the right leg. States that she has a loss of sensation in the right leg. States that she has some sensation left but states it is definitely decreased in her baseline. She believes this is all due to injuring her low back. She denies any trauma. She does have pain in her back. States he does have history of chronic back pain reports that symptoms have worsened since Monday. He says she is having some difficulty walking and states that she is dragging the right leg. She has no symptoms of headache or vision changes. No upper extremity weakness or numbness. Denies any loss of bowel or bladder function. No symptoms of fever. Again no trauma. Nursing Notes were all reviewed and agreed with or any disagreements were addressed in the HPI.     REVIEW OF SYSTEMS    (2-9 systems for level 4, 10 or more for level 5)     Review of Systems   Constitutional:  Negative for chills, diaphoresis and fever. HENT:  Negative for congestion, ear pain, rhinorrhea and sore throat. Eyes:  Negative for pain and visual disturbance. Respiratory:  Negative for cough and shortness of breath. Cardiovascular:  Negative for chest pain and leg swelling. Gastrointestinal:  Negative for abdominal pain, blood in stool, diarrhea, nausea and vomiting. Genitourinary:  Negative for difficulty urinating, dysuria, flank pain and frequency. Musculoskeletal:  Positive for back pain. Negative for neck pain. Skin:  Negative for rash and wound. Neurological:  Positive for weakness and numbness. Negative for dizziness and light-headedness.      PAST MEDICAL HISTORY     Past Medical History:   Diagnosis Date    Alcohol abuse 2014    DDD (degenerative disc disease), lumbosacral     Depression     Gastritis     Heart murmur     Heart palpitations     Migraine     Osteoarthritis     Overactive bladder     SCIATIC NERVE DISEASE     Subarachnoid hemorrhage ([facility] [address] 2007       SURGICAL HISTORY     Past Surgical History:   Procedure Laterality Date    BRAIN SURGERY      PT STATED DRILLED HOLE TO RELIEVE BLOOD FROM HEMORRHAGE    CHOLECYSTECTOMY  01/15/2018     LAPAROSCOPIC CHOLECYSTECTOMY              ENDOMETRIAL ABLATION      TONSILLECTOMY      TUBAL LIGATION         CURRENTMEDICATIONS       Discharge Medication List as of 9/1/2022  3:58 AM        CONTINUE these medications which have NOT CHANGED    Details   albuterol sulfate  (90 Base) MCG/ACT inhaler Inhale 2 puffs into the lungs every 4 hours as needed for Wheezing or Shortness of Breath (Space out to every 6 hours as symptoms improve), Disp-1 each, R-0Print      predniSONE (DELTASONE) 10 MG tablet Take 1 tablet by mouth See Admin Instructions Take 4 tablets ×4 days  Take 3 tablets ×3 days  Take 2 tablets ×2 days  Take 1 tablet ×1 day, Disp-30 tablet, R-0Print montelukast (SINGULAIR) 10 MG tablet Take by mouthHistorical Med      buPROPion HCl (WELLBUTRIN PO) Take by mouth DailyHistorical Med      methylPREDNISolone (MEDROL, REX,) 4 MG tablet Take by mouth., Disp-1 kit, R-0Print      rizatriptan (MAXALT) 10 MG tablet TAKE 1 TABLET BY MOUTH ONCE AS NEEDED FOR MIGRAINE MAY REPEAT IN 1-2 HOURS IF NEEDED, Disp-12 tablet, R-3Normal      LYRICA 75 MG capsule TAKE 1 CAPSULE BY MOUTH TWICE A DAY, Disp-60 capsule, R-0Print      eletriptan (RELPAX) 40 MG tablet TAKE 1 TABLET BY MOUTH AS NEEDED MAY REPEAT IN 2 HOURS IF NECESSARY, Disp-6 tablet, R-5Normal      vitamin B-2 (RIBOFLAVIN) 100 MG TABS tablet Take 100 mg by mouth dailyHistorical Med      Omega-3 Fatty Acids (FISH OIL PO) Take by mouth             ALLERGIES     Ibuprofen, Vicodin [hydrocodone-acetaminophen], and Tramadol    FAMILYHISTORY       Family History   Problem Relation Age of Onset    Arthritis Mother     Asthma Mother     Depression Mother     Diabetes Mother     Hearing Loss Mother     Heart Disease Mother     High Blood Pressure Mother     High Cholesterol Mother     Mental Illness Mother     Stroke Mother     Vision Loss Mother     Arthritis Father     Hearing Loss Father     High Blood Pressure Father     High Cholesterol Father     Substance Abuse Father     Vision Loss Father     Arthritis Sister     Depression Sister     High Cholesterol Sister     Mental Retardation Sister     Miscarriages / Stillbirths Sister         SOCIAL HISTORY       Social History     Socioeconomic History    Marital status:      Spouse name: None    Number of children: None    Years of education: None    Highest education level: None   Tobacco Use    Smoking status: Every Day     Packs/day: 1.00     Types: Cigarettes     Last attempt to quit: 2016     Years since quittin.9    Smokeless tobacco: Never   Substance and Sexual Activity    Alcohol use: Yes     Comment: Occ.     Drug use: No       SCREENINGS    Heron Coma Scale  Eye Opening: Spontaneous  Best Verbal Response: Oriented  Best Motor Response: Obeys commands  Heron Coma Scale Score: 15        PHYSICAL EXAM    (up to 7 for level 4, 8 or more for level 5)     ED Triage Vitals   BP Temp Temp Source Heart Rate Resp SpO2 Height Weight   08/31/22 1741 08/31/22 1741 08/31/22 1741 08/31/22 1741 08/31/22 1741 08/31/22 1741 08/31/22 1742 08/31/22 1742   (!) 141/84 97.8 °F (36.6 °C) Oral 77 16 97 % 5' 5\" (1.651 m) 119 lb (54 kg)       Physical Exam  Vitals and nursing note reviewed. Constitutional:       Appearance: Normal appearance. She is not toxic-appearing or diaphoretic. HENT:      Head: Normocephalic and atraumatic. Nose: Nose normal.   Eyes:      General: No visual field deficit. Right eye: No discharge. Left eye: No discharge. Cardiovascular:      Rate and Rhythm: Normal rate and regular rhythm. Pulses: Normal pulses. Heart sounds: No murmur heard. Pulmonary:      Effort: Pulmonary effort is normal. No respiratory distress. Breath sounds: No wheezing or rhonchi. Abdominal:      Palpations: Abdomen is soft. Tenderness: There is no abdominal tenderness. There is no guarding or rebound. Musculoskeletal:      Cervical back: Normal, normal range of motion and neck supple. Thoracic back: Normal.      Lumbar back: Tenderness present. No lacerations. Normal range of motion. Positive right straight leg raise test. Negative left straight leg raise test.      Right lower leg: No swelling. No edema. Left lower leg: Normal. No swelling or tenderness. No edema. Comments: General weakness in the right lower extremity. Weakness noted 3 out of 5 on the right leg compared to 5 out of 5 on the left leg. Patient also has significant numbness involving the right leg. Significant decrease sensation. Patient is barely able to lift extremity for a moment against gravity. Skin:     General: Skin is warm and dry. Neurological:      General: No focal deficit present. Mental Status: She is alert and oriented to person, place, and time. GCS: GCS eye subscore is 4. GCS verbal subscore is 5. GCS motor subscore is 6. Cranial Nerves: Cranial nerves are intact. No dysarthria. Sensory: Sensation is intact. No sensory deficit. Motor: Weakness present. Gait: Gait is intact. Deep Tendon Reflexes:      Reflex Scores:       Patellar reflexes are 2+ on the right side and 2+ on the left side. Psychiatric:         Mood and Affect: Mood normal.         Behavior: Behavior normal.       DIAGNOSTIC RESULTS   LABS:    Labs Reviewed   CBC WITH AUTO DIFFERENTIAL - Abnormal; Notable for the following components:       Result Value    Basophils Absolute 0.3 (*)     All other components within normal limits   URINALYSIS WITH MICROSCOPIC - Abnormal; Notable for the following components:    Mucus, UA Rare (*)     Bacteria, UA 1+ (*)     All other components within normal limits   COMPREHENSIVE METABOLIC PANEL       When ordered, only abnormal lab results are displayed. All other labs were within normal range or not returned as of this dictation. EKG: When ordered, EKG's are interpreted by the Emergency Department Physician in the absence of a cardiologist.  Please see their note for interpretation of EKG. RADIOLOGY:   Non-plain film images such as CT, Ultrasound and MRI are read by the radiologist. Naima Colin radiographic images are visualized andpreliminarily interpreted by the  ED Provider with the below findings:        Interpretation [facility] Radiologist below, if available at the time of this note:    MRI [address]   Final Result   Moderate broad-based posterior disc protrusion at L5-S1. Moderate narrowing   of the neural foramina bilaterally. Mild broad-based annular bulges at L3-4 and L4-5. No results found.       PROCEDURES   Unless otherwise noted below, none Procedures    CRITICAL CARE TIME   N/A    CONSULTS:  IP CONSULT TO NEUROSURGERY  IP CONSULT TO HOSPITALIST      EMERGENCY DEPARTMENT COURSE and DIFFERENTIAL DIAGNOSIS/MDM:   Vitals:    Vitals:    08/31/22 1946 08/31/22 2102 09/01/22 0011 09/01/22 0332   BP: 117/77 135/75 (!) 141/71 124/75   Pulse: 72 70 67 65   Resp: 20  18    Temp:       TempSrc:       SpO2: 98% 98% 97% 95%   Weight:       Height:           Patient was given thefollowing medications:  Medications   ondansetron (ZOFRAN) injection 4 mg (4 mg IntraVENous Given 8/31/22 1941)   morphine sulfate (PF) injection 4 mg (4 mg IntraVENous Given 8/31/22 1941)   gadoteridol (PROHANCE) injection 10 mL (10 mLs IntraVENous Given 8/31/22 2159)   morphine sulfate (PF) injection 4 mg (4 mg IntraVENous Given 9/1/22 0009)     ED Course as of 09/02/22 1336   Wed Aug 31, 2022   2127 Urinalysis shows no evidence of blood or infection. Low suspicion for UTI or kidney stone [ER]   2127 No electrolyte abnormalities or evidence of kidney dysfunction [ER]   2127 Liver function testing unremarkable [ER]   2127 No Leukocytosis, anemia, thrombocytopenia [ER]   2307 MRI L spine: IMPRESSION:  Moderate broad-based posterior disc protrusion at L5-S1. Moderate narrowing  of the neural foramina bilaterally. Mild broad-based annular bulges at L3-4 and L4-5.  ------  Discussed with neurosurgery given neurologic deficits [ER]      ED Course User Index  [ER] Emily Steinberg MD      Is this patient to be included in the SEP-1 Core Measure due to severe sepsis or septic shock? No   Exclusion criteria - the patient is NOT to be included for SEP-1 Core Measure due to:  2+ SIRS criteria are not met  Signout provided to Dr. Gary Segura. Patient is noted to have right leg weakness and likely due to symptoms in her lumbar spine. I believe that the plan of my last evaluation of the patient she was awaiting on MRI here in the emergency department.   She was having difficulty walking and having difficulty in performing range of motion of the right leg. At this time I believe this is a issue within the low back and not culprit of any intracranial etiology. The patient agrees with the treatment plan and currently pain controlled here in the emergency department. At this time Dr. Amor Garcia will follow results of the MRI and we will set up for further evaluation and disposition. I am the Primary Clinician of Record. FINAL IMPRESSION      1. Midline low back pain, unspecified chronicity, unspecified whether sciatica present    2. Right leg weakness    3. Numbness in right leg          DISPOSITION/PLAN   DISPOSITION Decision To Transfer 09/01/2022 12:10:22 AM      PATIENT REFERREDTO:  No follow-up provider specified.     DISCHARGE MEDICATIONS:  Discharge Medication List as of 9/1/2022  3:58 AM          DISCONTINUED MEDICATIONS:  Discharge Medication List as of 9/1/2022  3:58 AM                 (Please note that portions ofthis note were completed with a voice recognition program.  Efforts were made to edit the dictations but occasionally words are mis-transcribed.)    LV Esparza CNP (electronically signed)             LV Esparza CNP  09/02/22 4666

## 2022-08-31 NOTE — ED PROVIDER NOTES
I independently examined and evaluated Neema Mosquera. I personally saw the patient and performed a substantive portion of the visit including all aspects of the medical decision making. I am the primary physician of record. In brief, Neema Mosquera is a 46 y.o. female with a past medical history of migraine and sciatica, who presents to the ED complaining of low back pain and right leg neurologic symptoms. Patient reports chronic low back pain that radiates down the right leg. However, now patient is reporting decreased sensation on the lateral part of the leg as well as weakness over the past 2 days. She does report some difficulty walking due to this weakness. She denies headache, upper extremity symptoms, loss of bowel or bladder function. She denies fever. No trauma. Back Pain Red Flags:   IV drug abuse? No   Fever without source? No   Systemic illness? No   Immunosuppressed? No   Recent surgery/procedure? No   Incontinence or retention? No   Indwelling parker? No   Alcohol abuse? No      Diabetes? No   Night pain? No   3rd visit in 20 days? No   Renal failure? No   Total: 0         REVIEW OF SYSTEMS  All systems reviewed, pertinent positives per HPI otherwise noted to be negative. Focused exam revealed   PHYSICAL EXAM  BP (!) 141/84   Pulse 77   Temp 97.8 °F (36.6 °C) (Oral)   Resp 16   Ht 5' 5\" (1.651 m)   Wt 119 lb (54 kg)   SpO2 97%   BMI 19.80 kg/m²    GENERAL APPEARANCE: Awake and alert. Cooperative. uncomfortable. HENT: Normocephalic. Atraumatic. Mucous membranes are moist  NECK: Supple. Full range of motion of the neck without stiffness or pain. EYES: PERRL. EOM's grossly intact. HEART/CHEST: RRR. No murmurs. Chest wall is not tender to palpation. LUNGS: Respirations unlabored. CTAB. Good air exchange. Speaking comfortably in full sentences. ABDOMEN: No tenderness. Soft. Non-distended. No masses. No organomegaly. No guarding or rebound.    BACK: lumbar midline spinal tenderness. no CVA tenderness  MUSCULOSKELETAL: No extremity edema. Compartments soft. No deformity. No tenderness in the extremities. All extremities vascularly intact. SKIN: Warm and dry. No acute rashes. NEUROLOGICAL: Awake, alert and oriented x 3. Difficulty lifting right leg off the bed, intact strength at the knee and ankle. Sensation is in the lateral arch of the right lower extremity. Patellar DTRs intact. Patient denies saddle anesthesia. No ataxia. PSYCHIATRIC: Normal mood and affect. ED course / MDM:   Overall well appearing patient, presenting for lumbar back pain and new weakness and numbness. Physical exam remarkable for decree sensation and weakness of the right lower extremity. I personally saw the patient and performed a substantive portion of the visit including all aspects of the medical decision making. Differential diagnosis includes but is not limited to: Musculoskeletal pain, spinal stenosis, sciatica, disc herniation, degenerative joint disease, kidney stone, pyelonephritis, low suspicion for spinal fracture, epidural abscess, cauda equina, AAA, aortic dissection        Workup showed:    Imaging:  MRI LUMBAR SPINE W WO CONTRAST   Final Result   Moderate broad-based posterior disc protrusion at L5-S1. Moderate narrowing   of the neural foramina bilaterally. Mild broad-based annular bulges at L3-4 and L4-5. ED Course as of 09/01/22 2057   Wed Aug 31, 2022   2127 Urinalysis shows no evidence of blood or infection. Low suspicion for UTI or kidney stone [ER]   2127 No electrolyte abnormalities or evidence of kidney dysfunction [ER]   2127 Liver function testing unremarkable [ER]   2127 No Leukocytosis, anemia, thrombocytopenia [ER]   2307 MRI L spine: IMPRESSION:  Moderate broad-based posterior disc protrusion at L5-S1. Moderate narrowing  of the neural foramina bilaterally.      Mild broad-based annular bulges at L3-4 and L4-5.  ------  Discussed with neurosurgery given neurologic deficits [ER]      ED Course User Index  [ER] Anabel Carr MD     Patient reports that her symptoms have been progressive over the past 2 days, presentation is not consistent with stroke. Is this patient to be included in the SEP-1 Core Measure due to severe sepsis or septic shock? No   Exclusion criteria - the patient is NOT to be included for SEP-1 Core Measure due to:  2+ SIRS criteria are not met    At least 3 minutes of smoking cessation education was provided to the patient. MRI without evidence of cauda equina, spinal abscess, or other significant abnormality. Does show some evidence of foraminal narrowing. Did speak to neurosurgery regarding patient's objective findings on exam of weakness and report of decreased sensation. Neurosurgery did not feel that patient Judaism time. They recommended not at MRI of the thoracic spine and neurology consultation once in the hospital. At this time, do feel the patient requires admission for further work-up and management. Discussed the patient with hospital team.    CLINICAL IMPRESSION  1. Midline low back pain, unspecified chronicity, unspecified whether sciatica present    2. Right leg weakness    3. Numbness in right leg        Blood pressure 124/75, pulse 65, temperature 97.8 °F (36.6 °C), temperature source Oral, resp. rate 18, height 5' 5\" (1.651 m), weight 119 lb (54 kg), SpO2 95 %, not currently breastfeeding. DISPOSITION  Leslee Ashley was transferred to [facility]  in stable condition. All diagnostic, treatment, and disposition decisions were made by myself in conjunction with the advanced practice provider. For all further details of the patient's emergency department visit, please see the advanced practice provider's documentation.     Comment: Please note this report has been produced using speech recognition software and may contain errors related to that system including errors in grammar, punctuation, and spelling, as well as words and phrases that may be inappropriate. If there are any questions or concerns please feel free to contact the dictating provider for clarification.        Aisha Whaley MD  09/01/22 6699

## 2022-09-01 ENCOUNTER — APPOINTMENT (OUTPATIENT)
Dept: MRI IMAGING | Age: 52
End: 2022-09-01
Attending: INTERNAL MEDICINE
Payer: MEDICARE

## 2022-09-01 ENCOUNTER — HOSPITAL ENCOUNTER (OUTPATIENT)
Age: 52
Setting detail: OBSERVATION
Discharge: HOME | End: 2022-09-02
Attending: INTERNAL MEDICINE | Admitting: INTERNAL MEDICINE
Payer: MEDICARE

## 2022-09-01 VITALS
BODY MASS INDEX: 19.83 KG/M2 | DIASTOLIC BLOOD PRESSURE: 75 MMHG | TEMPERATURE: 97.8 F | HEIGHT: 65 IN | RESPIRATION RATE: 18 BRPM | HEART RATE: 65 BPM | SYSTOLIC BLOOD PRESSURE: 124 MMHG | WEIGHT: 119 LBS | OXYGEN SATURATION: 95 %

## 2022-09-01 DIAGNOSIS — R29.898 RIGHT LEG WEAKNESS: Primary | ICD-10-CM

## 2022-09-01 PROCEDURE — 72146 MRI CHEST SPINE W/O DYE: CPT

## 2022-09-01 PROCEDURE — G0379 DIRECT REFER HOSPITAL OBSERV: HCPCS

## 2022-09-01 PROCEDURE — 6360000002 HC RX W HCPCS: Performed by: STUDENT IN AN ORGANIZED HEALTH CARE EDUCATION/TRAINING PROGRAM

## 2022-09-01 PROCEDURE — 1200000000 HC SEMI PRIVATE

## 2022-09-01 PROCEDURE — G0378 HOSPITAL OBSERVATION PER HR: HCPCS

## 2022-09-01 PROCEDURE — 72141 MRI NECK SPINE W/O DYE: CPT

## 2022-09-01 PROCEDURE — 6370000000 HC RX 637 (ALT 250 FOR IP): Performed by: STUDENT IN AN ORGANIZED HEALTH CARE EDUCATION/TRAINING PROGRAM

## 2022-09-01 PROCEDURE — 96374 THER/PROPH/DIAG INJ IV PUSH: CPT

## 2022-09-01 PROCEDURE — 6370000000 HC RX 637 (ALT 250 FOR IP): Performed by: INTERNAL MEDICINE

## 2022-09-01 PROCEDURE — 2580000003 HC RX 258: Performed by: INTERNAL MEDICINE

## 2022-09-01 PROCEDURE — 6360000002 HC RX W HCPCS: Performed by: INTERNAL MEDICINE

## 2022-09-01 RX ORDER — MORPHINE SULFATE 4 MG/ML
4 INJECTION, SOLUTION INTRAMUSCULAR; INTRAVENOUS EVERY 4 HOURS PRN
Status: DISCONTINUED | OUTPATIENT
Start: 2022-09-01 | End: 2022-09-02 | Stop reason: HOSPADM

## 2022-09-01 RX ORDER — OXYCODONE HYDROCHLORIDE AND ACETAMINOPHEN 5; 325 MG/1; MG/1
1 TABLET ORAL EVERY 6 HOURS PRN
COMMUNITY

## 2022-09-01 RX ORDER — LIDOCAINE 4 G/G
1 PATCH TOPICAL DAILY
Status: DISCONTINUED | OUTPATIENT
Start: 2022-09-01 | End: 2022-09-02 | Stop reason: HOSPADM

## 2022-09-01 RX ORDER — ACETAMINOPHEN 325 MG/1
650 TABLET ORAL EVERY 6 HOURS PRN
Status: DISCONTINUED | OUTPATIENT
Start: 2022-09-01 | End: 2022-09-02 | Stop reason: HOSPADM

## 2022-09-01 RX ORDER — MORPHINE SULFATE 4 MG/ML
4 INJECTION, SOLUTION INTRAMUSCULAR; INTRAVENOUS ONCE
Status: COMPLETED | OUTPATIENT
Start: 2022-09-01 | End: 2022-09-01

## 2022-09-01 RX ORDER — ONDANSETRON 4 MG/1
4 TABLET, ORALLY DISINTEGRATING ORAL EVERY 8 HOURS PRN
Status: DISCONTINUED | OUTPATIENT
Start: 2022-09-01 | End: 2022-09-02 | Stop reason: HOSPADM

## 2022-09-01 RX ORDER — POLYETHYLENE GLYCOL 3350 17 G/17G
17 POWDER, FOR SOLUTION ORAL DAILY PRN
Status: DISCONTINUED | OUTPATIENT
Start: 2022-09-01 | End: 2022-09-02 | Stop reason: HOSPADM

## 2022-09-01 RX ORDER — TIZANIDINE 4 MG/1
4 TABLET ORAL 3 TIMES DAILY
Status: DISCONTINUED | OUTPATIENT
Start: 2022-09-01 | End: 2022-09-02 | Stop reason: HOSPADM

## 2022-09-01 RX ORDER — ENOXAPARIN SODIUM 100 MG/ML
40 INJECTION SUBCUTANEOUS DAILY
Status: DISCONTINUED | OUTPATIENT
Start: 2022-09-01 | End: 2022-09-02 | Stop reason: HOSPADM

## 2022-09-01 RX ORDER — SODIUM CHLORIDE 0.9 % (FLUSH) 0.9 %
5-40 SYRINGE (ML) INJECTION EVERY 12 HOURS SCHEDULED
Status: DISCONTINUED | OUTPATIENT
Start: 2022-09-01 | End: 2022-09-02 | Stop reason: HOSPADM

## 2022-09-01 RX ORDER — DIPHENHYDRAMINE HCL 25 MG
25 TABLET ORAL EVERY 6 HOURS PRN
Status: DISCONTINUED | OUTPATIENT
Start: 2022-09-01 | End: 2022-09-02 | Stop reason: HOSPADM

## 2022-09-01 RX ORDER — NICOTINE 21 MG/24HR
1 PATCH, TRANSDERMAL 24 HOURS TRANSDERMAL DAILY
Status: DISCONTINUED | OUTPATIENT
Start: 2022-09-01 | End: 2022-09-02 | Stop reason: HOSPADM

## 2022-09-01 RX ORDER — ACETAMINOPHEN 650 MG/1
650 SUPPOSITORY RECTAL EVERY 6 HOURS PRN
Status: DISCONTINUED | OUTPATIENT
Start: 2022-09-01 | End: 2022-09-02 | Stop reason: HOSPADM

## 2022-09-01 RX ORDER — SODIUM CHLORIDE 9 MG/ML
INJECTION, SOLUTION INTRAVENOUS PRN
Status: DISCONTINUED | OUTPATIENT
Start: 2022-09-01 | End: 2022-09-02 | Stop reason: HOSPADM

## 2022-09-01 RX ORDER — NICOTINE 21 MG/24HR
1 PATCH, TRANSDERMAL 24 HOURS TRANSDERMAL ONCE
Status: DISCONTINUED | OUTPATIENT
Start: 2022-09-01 | End: 2022-09-01 | Stop reason: HOSPADM

## 2022-09-01 RX ORDER — OXYCODONE HYDROCHLORIDE AND ACETAMINOPHEN 5; 325 MG/1; MG/1
1 TABLET ORAL EVERY 6 HOURS PRN
Status: DISCONTINUED | OUTPATIENT
Start: 2022-09-01 | End: 2022-09-02 | Stop reason: HOSPADM

## 2022-09-01 RX ORDER — ONDANSETRON 2 MG/ML
4 INJECTION INTRAMUSCULAR; INTRAVENOUS EVERY 6 HOURS PRN
Status: DISCONTINUED | OUTPATIENT
Start: 2022-09-01 | End: 2022-09-02 | Stop reason: HOSPADM

## 2022-09-01 RX ORDER — SODIUM CHLORIDE 0.9 % (FLUSH) 0.9 %
5-40 SYRINGE (ML) INJECTION PRN
Status: DISCONTINUED | OUTPATIENT
Start: 2022-09-01 | End: 2022-09-02 | Stop reason: HOSPADM

## 2022-09-01 RX ORDER — SUMATRIPTAN 100 MG/1
100 TABLET, FILM COATED ORAL ONCE
Refills: 3 | Status: COMPLETED | OUTPATIENT
Start: 2022-09-01 | End: 2022-09-01

## 2022-09-01 RX ADMIN — SUMATRIPTAN SUCCINATE 100 MG: 100 TABLET ORAL at 09:42

## 2022-09-01 RX ADMIN — TIZANIDINE 4 MG: 4 TABLET ORAL at 19:59

## 2022-09-01 RX ADMIN — MORPHINE SULFATE 4 MG: 4 INJECTION INTRAVENOUS at 05:42

## 2022-09-01 RX ADMIN — MORPHINE SULFATE 4 MG: 4 INJECTION, SOLUTION INTRAMUSCULAR; INTRAVENOUS at 00:09

## 2022-09-01 RX ADMIN — OXYCODONE HYDROCHLORIDE AND ACETAMINOPHEN 1 TABLET: 5; 325 TABLET ORAL at 16:44

## 2022-09-01 RX ADMIN — SODIUM CHLORIDE, PRESERVATIVE FREE 10 ML: 5 INJECTION INTRAVENOUS at 23:11

## 2022-09-01 RX ADMIN — SODIUM CHLORIDE, PRESERVATIVE FREE 10 ML: 5 INJECTION INTRAVENOUS at 08:53

## 2022-09-01 RX ADMIN — TIZANIDINE 4 MG: 4 TABLET ORAL at 12:15

## 2022-09-01 RX ADMIN — OXYCODONE HYDROCHLORIDE AND ACETAMINOPHEN 1 TABLET: 5; 325 TABLET ORAL at 09:42

## 2022-09-01 RX ADMIN — DIPHENHYDRAMINE HYDROCHLORIDE 25 MG: 25 TABLET ORAL at 19:59

## 2022-09-01 RX ADMIN — TIZANIDINE 4 MG: 4 TABLET ORAL at 16:43

## 2022-09-01 RX ADMIN — OXYCODONE HYDROCHLORIDE AND ACETAMINOPHEN 1 TABLET: 5; 325 TABLET ORAL at 23:01

## 2022-09-01 ASSESSMENT — PAIN SCALES - GENERAL
PAINLEVEL_OUTOF10: 8
PAINLEVEL_OUTOF10: 4
PAINLEVEL_OUTOF10: 10
PAINLEVEL_OUTOF10: 5
PAINLEVEL_OUTOF10: 6
PAINLEVEL_OUTOF10: 3

## 2022-09-01 ASSESSMENT — PAIN DESCRIPTION - LOCATION
LOCATION: LEG
LOCATION: BACK
LOCATION: BACK;LEG
LOCATION: LEG

## 2022-09-01 ASSESSMENT — PAIN - FUNCTIONAL ASSESSMENT
PAIN_FUNCTIONAL_ASSESSMENT: PREVENTS OR INTERFERES SOME ACTIVE ACTIVITIES AND ADLS

## 2022-09-01 ASSESSMENT — PAIN DESCRIPTION - ORIENTATION
ORIENTATION: RIGHT
ORIENTATION: MID

## 2022-09-01 ASSESSMENT — PAIN DESCRIPTION - FREQUENCY
FREQUENCY: CONTINUOUS

## 2022-09-01 ASSESSMENT — PAIN DESCRIPTION - PAIN TYPE
TYPE: ACUTE PAIN

## 2022-09-01 ASSESSMENT — PAIN DESCRIPTION - ONSET
ONSET: ON-GOING

## 2022-09-01 ASSESSMENT — PAIN DESCRIPTION - DESCRIPTORS
DESCRIPTORS: ACHING
DESCRIPTORS: ACHING;DISCOMFORT
DESCRIPTORS: DISCOMFORT
DESCRIPTORS: PINS AND NEEDLES

## 2022-09-01 NOTE — ED NOTES
Bladder Scan completed at this time. Pt. With 150 mL in her bladder. Pt. Reports last voiding approx 1 hr ago.      Kiana Mane RN  08/31/22 2028

## 2022-09-01 NOTE — PROGRESS NOTES
[facility]   Herbal and Nutritional Product Restrictions      The following herbal, alternative, and/or nutritional/dietary supplement product(s) has been discontinued per [employer] approved policy:    Vitamin B-2 (riboflavin) 100 mg tablet daily     Please reorder upon discharge if appropriate.     Thank you,  Gayle Briggs, [address] [place] [address]  9/1/2022 9:14 AM

## 2022-09-01 NOTE — ED NOTES
2330  Call placed to [facility] for consult with Neurosurgery at [facility].      Harinibeatriz Xavier  08/31/22 [id number] consulkt completed with call back from Dr. Lux Casas speaking with Dr. Ani Plummer  08/31/22 0465

## 2022-09-01 NOTE — PROGRESS NOTES
Pt began complaining of a itching feeling in the posterior right thigh nothing physically noted and did not travel anywhere notified Lisseth Beyer and gave pt an ice pack to try and help will follow

## 2022-09-01 NOTE — CONSULTS
Sridhar Kerr MD is requesting this consult. Reason for Consult: Right lower extremity weakness  Admission Chief Complaint: Back pain, right leg pain    History of Present Illness     Jorje Huerta is a 46 y.o. y/o female with depression, degenerative disc disease, migraines, subarachnoid hemorrhage (2007) who presents with intractable back pain, right leg pain and numbness/weakness. Patient states that she has had right sciatica pain previously, typically it does not last this long and is not associated with numbness or weakness. Her last flare she was prescribed steroids and a muscle relaxer and had good improvement in her symptoms after this. Currently she has right leg numbness that radiates from the top of her right butt cheek down to the back of her knee and has numbness to the bottom of her foot. She does have shooting pain with any movement and with coughing down her right leg that is consistent with her previous pain. Patient also has a history of neck pain, she previously had left arm numbness and was told that she had cervical stenosis and needed surgery, her surgery was delayed due to the pandemic and her pain improved spontaneously, she did not pursue any further treatment for her neck pain. REVIEW OF SYSTEMS:   Constitutional- No weight loss or fevers   Eyes- No diplopia. No photophobia. Ears/nose/throat- No dysphagia. No Dysarthria   Cardiovascular- No palpitations. No chest pain   Respiratory- No dyspnea. No Cough   Gastrointestinal- No Abdominal pain. No Vomiting. Genitourinary- No incontinence. No urinary retention   Musculoskeletal- No myalgia. No arthralgia   Skin- No rash. No easy bruising. Psychiatric- No depression. No anxiety   Endocrine- No diabetes. No thyroid issues. Hematologic- No bleeding difficulty.  No fatigue   Neurologic-right leg pain numbness and weakness    Past Medical, Surgical, Family, and Social History   PAST MEDICAL HISTORY:  Past Medical History:   Diagnosis Date    Alcohol abuse     DDD (degenerative disc disease), lumbosacral     Depression     Gastritis     Heart murmur     Heart palpitations     Migraine     Osteoarthritis     Overactive bladder     SCIATIC NERVE DISEASE     Subarachnoid hemorrhage ([facility] [address]      SURGICAL HISTORY:  Past Surgical History:   Procedure Laterality Date    BRAIN SURGERY      PT STATED DRILLED HOLE TO RELIEVE BLOOD FROM HEMORRHAGE    CHOLECYSTECTOMY  01/15/2018     LAPAROSCOPIC CHOLECYSTECTOMY              ENDOMETRIAL ABLATION      TONSILLECTOMY      TUBAL LIGATION       FAMILY HISTORY & SOCIAL HISTORY:  Family history non-contributory  Family History   Problem Relation Age of Onset    Arthritis Mother     Asthma Mother     Depression Mother     Diabetes Mother     Hearing Loss Mother     Heart Disease Mother     High Blood Pressure Mother     High Cholesterol Mother     Mental Illness Mother     Stroke Mother     Vision Loss Mother     Arthritis Father     Hearing Loss Father     High Blood Pressure Father     High Cholesterol Father     Substance Abuse Father     Vision Loss Father     Arthritis Sister     Depression Sister     High Cholesterol Sister     Mental Retardation Sister     [de-identified] / [place] Sister      Social History     Tobacco Use    Smoking status: Every Day     Packs/day: 1.00     Types: Cigarettes     Last attempt to quit: 2016     Years since quittin.9    Smokeless tobacco: Never   Substance Use Topics    Alcohol use: Yes     Comment: Occ.     Drug use: No          Allergies & Outpatient Medications   ALLERGIES:  Allergies   Allergen Reactions    Ibuprofen      headache    Vicodin [Hydrocodone-Acetaminophen]     Tramadol Nausea And Vomiting     HOME MEDICATIONS:  Current Discharge Medication List        CONTINUE these medications which have NOT CHANGED    Details   oxyCODONE-acetaminophen (PERCOCET) 5-325 MG per tablet Take 1 tablet by mouth every 6 hours as needed for Pain.      montelukast (SINGULAIR) 10 MG tablet Take by mouth      albuterol sulfate  (90 Base) MCG/ACT inhaler Inhale 2 puffs into the lungs every 4 hours as needed for Wheezing or Shortness of Breath (Space out to every 6 hours as symptoms improve)  Qty: 1 each, Refills: 0      predniSONE (DELTASONE) 10 MG tablet Take 1 tablet by mouth See Admin Instructions Take 4 tablets ×4 days  Take 3 tablets ×3 days  Take 2 tablets ×2 days  Take 1 tablet ×1 day  Qty: 30 tablet, Refills: 0      buPROPion HCl (WELLBUTRIN PO) Take by mouth Daily      methylPREDNISolone (MEDROL, REX,) 4 MG tablet Take by mouth. Qty: 1 kit, Refills: 0      LYRICA 75 MG capsule TAKE 1 CAPSULE BY MOUTH TWICE A DAY  Qty: 60 capsule, Refills: 0    Comments: This request is for a new prescription for a controlled substance as required by Federal/State law.   Associated Diagnoses: Cervical radiculopathy, acute      rizatriptan (MAXALT) 10 MG tablet TAKE 1 TABLET BY MOUTH ONCE AS NEEDED FOR MIGRAINE MAY REPEAT IN 1-2 HOURS IF NEEDED  Qty: 12 tablet, Refills: 3    Associated Diagnoses: Migraine with aura and without status migrainosus, not intractable      eletriptan (RELPAX) 40 MG tablet TAKE 1 TABLET BY MOUTH AS NEEDED MAY REPEAT IN 2 HOURS IF NECESSARY  Qty: 6 tablet, Refills: 5      vitamin B-2 (RIBOFLAVIN) 100 MG TABS tablet Take 100 mg by mouth daily      Omega-3 Fatty Acids (FISH OIL PO) Take by mouth               Physical Exam   PHYSICAL EXAM:  /60   Pulse 68   Temp 98 °F (36.7 °C) (Oral)   Resp 18   Ht 5' 5\" (1.651 m)   Wt 121 lb 14.6 oz (55.3 kg)   SpO2 94%   BMI 20.29 kg/m²     General Alert, no distress, well-nourished    Neurologic  Mental status:   Orientation to person, place, time, situation  Attention intact as able to attend well to the exam    Language fluent in conversation  Comprehension intact; follows simple commands    Motor Exam:   R  L    Deltoid 5 5   Biceps 5 5   Triceps 5 5   Wrist extension  5 5   Interossei 5 5   Negative hoffmans bilaterally      R  L    Hip flexion  5 5   Hip extension  5 5   Knee flexion  4 5   Knee extension  4 5   Ankle dorsiflexion  4 5   Ankle plantar flexion  4 5     Deep tendon reflexes:    R  L    Patellar  3+ 2+     Sensory: Decreased sensation over right posterior thigh, otherwise sensation intact  Tone: normal in all 4 extremities  Patient is able to ambulate with cane, she does have slightly antalgic gait    Cardiovascular: Warm, appears well perfused   Respiratory: Easy, non-labored respiratory pattern   Abdominal: Abdomen is without distention   Extremities: Upper and lower extremities are atraumatic in appearance without deformity. Diagnostic Results   IMAGES:  Images personally reviewed and agree w/ radiology interpretation. MRI of lumbar spine with and without contrast  Impression   Moderate broad-based posterior disc protrusion at L5-S1. Moderate narrowing   of the neural foramina bilaterally. Mild broad-based annular bulges at L3-4 and L4-5. LABS:  All results below personally reviewed. Pertinent positives & negatives are addressed in Impression & Recommendations below.      LABS   Metabolic Panel Recent Labs     08/31/22 1940      K 4.0      CO2 27   BUN 7   CREATININE 0.7   GLUCOSE 94   CALCIUM 9.8   LABALBU 4.6   ALKPHOS 77   ALT 16   AST 17      CBC / Coags Recent Labs     08/31/22 1940   WBC 7.2   RBC 4.34   HGB 13.5   HCT 40.5         Other Lab Results   Component Value Date/Time    LDLCALC 110 06/29/2016 10:44 AM    TRIG 91 06/29/2016 10:44 AM    TSH 1.05 12/10/2009 02:29 PM     No results found for: PHENYTOIN, KEPPRA, LACOSA, LAMO, VALPROATE, LACTSEPSIS, LACTA       CURRENT SCHEDULED MEDICATIONS   Inpatient Medications     sodium chloride flush, 5-40 mL, IntraVENous, 2 times per day    enoxaparin, 40 mg, SubCUTAneous, Daily    tiZANidine, 4 mg, Oral, TID    lidocaine, 1 patch, TransDERmal, Daily   Infusions sodium chloride        Antibiotics   Recent Abx Admin        No antibiotic orders with administrations found. IMPRESSION & RECOMMENDATIONS     IMPRESSION:  Patient is a 69-year-old female with right lower extremity numbness, weakness and intermittent pain. Reviewed MRI lumbar spine with Dr. Nigel Geronimo and it was not felt that the level of foraminal stenosis would not account for patient's symptoms and he would like to obtain imaging of thoracic spine, cervical spine added due to history of cervical stenosis. RECOMMENDATIONS:  MRI of thoracic and cervical spine without contrast  Medrol Dosepak  Muscle relaxers - Tizanidine -if this is creating too much sedation could try Robaxin 750 mg 4 times daily as needed. Will follow-up after imaging for further recommendation.       LV Lewis - CNP   9/1/2022 2:03 PM

## 2022-09-01 NOTE — ED NOTES
1 [facility] called back with Dr. Sherif Hester on the line speaking with Dr. Shahrair Hathaway  09/01/22 0008

## 2022-09-01 NOTE — PROGRESS NOTES
Physical Therapy and Occupational Therapy  No Treatment    Orders received and chart reviewed. Pt awaiting Neurosurgery consult and MRI. RN reports pt with increased pain. Will hold PT/OT at this time. RN in agreement with plan. Will follow up this afternoon as schedule permits vs 9/2.       Cooper Palacios, [facility], OTR/L

## 2022-09-01 NOTE — H&P
Hospital Medicine History & Physical      PCP: Parisa Madison MD    Date of Admission: 9/1/2022    Date of Service: Pt seen/examined on 9/1/2022 and Admitted to Inpatient with expected LOS greater than two midnights due to medical therapy. Chief Complaint:  lower back pain, right leg numbness and weakness      History Of Present Illness: The patient is a 46 y.o. female with medical history of migraine headache and back pain, who presents to [facility] with worsening lower back pain, radiating down to right leg, associated with numbness on the back of the leg and also weakness. Patient states her symptoms started suddenly while she was out shopping. She denies lifting anything heavy and no trauma, pulling or twisting to her back prior to the symptom onset. She had sciatica pains before and had ZOEY treatments previously, but never numbness and weakness. She has trouble ambulating and has to lift her leg with her hands for mobility. No urinary issues. Past Medical History:        Diagnosis Date    Alcohol abuse 2014    DDD (degenerative disc disease), lumbosacral     Depression     Gastritis     Heart murmur     Heart palpitations     Migraine     Osteoarthritis     Overactive bladder     SCIATIC NERVE DISEASE     Subarach[facility]oid hemorrhage ([facility] [address] 2007       Past Surgical History:        Procedure Laterality Date    BRAIN SURGERY      PT STATED DRILLED HOLE TO RELIEVE BLOOD FROM HEMORRHAGE    CHOLECYSTECTOMY  01/15/2018     LAPAROSCOPIC CHOLECYSTECTOMY              ENDOMETRIAL ABLATION      TONSILLECTOMY      TUBAL LIGATION         Medications Prior to Admission:    Prior to Admission medications    Medication Sig Start Date End Date Taking? Authorizing Provider   oxyCODONE-acetaminophen (PERCOCET) 5-325 MG per tablet Take 1 tablet by mouth every 6 hours as needed for Pain.    Yes Historical Provider, MD   montelukast (SINGULAIR) 10 MG tablet Take by mouth  Patient not taking: Reported on 9/1/2022 3/9/21   Historical Provider, MD   albuterol sulfate  (90 Base) MCG/ACT inhaler Inhale 2 puffs into the lungs every 4 hours as needed for Wheezing or Shortness of Breath (Space out to every 6 hours as symptoms improve) 12/5/21 1/4/22  Vinh Green MD   predniSONE (DELTASONE) 10 MG tablet Take 1 tablet by mouth See Admin Instructions Take 4 tablets ×4 days  Take 3 tablets ×3 days  Take 2 tablets ×2 days  Take 1 tablet ×1 day 12/6/21   Vinh Green MD   buPROPion HCl (WELLBUTRIN PO) Take by mouth Daily  Patient not taking: Reported on 9/1/2022    Historical Provider, MD   methylPREDNISolone (MEDROL, REX,) 4 MG tablet Take by mouth. Patient not taking: Reported on 9/1/2022 6/21/19   Davon Briscoe MD   LYRICA 75 MG capsule TAKE 1 CAPSULE BY MOUTH TWICE A DAY  Patient taking differently: Take 200 mg by mouth as needed. 11/12/18 6/21/19  LV Guzman CNP   rizatriptan (MAXALT) 10 MG tablet TAKE 1 TABLET BY MOUTH ONCE AS NEEDED FOR MIGRAINE MAY REPEAT IN 1-2 HOURS IF NEEDED 11/12/18   LV Guzman CNP   eletriptan (RELPAX) 40 MG tablet TAKE 1 TABLET BY MOUTH AS NEEDED MAY REPEAT IN 2 HOURS IF NECESSARY  Patient not taking: Reported on 9/1/2022 6/15/18   Reinaldo Ovalle MD   vitamin B-2 (RIBOFLAVIN) 100 MG TABS tablet Take 100 mg by mouth daily    Historical Provider, MD   Omega-3 Fatty Acids (FISH OIL PO) Take by mouth    Historical Provider, MD   oxybutynin (DITROPAN) 5 MG tablet Take 1 tablet by mouth daily. 9/24/13 4/29/14  Reinaldo Ovalle MD       Allergies:  Ibuprofen, Vicodin [hydrocodone-acetaminophen], and Tramadol    Social History:  The patient currently lives at home    TOBACCO:   reports that she has been smoking cigarettes. She has been smoking an average of 1 pack per day. She has never used smokeless tobacco.  ETOH:   reports current alcohol use.       Family History:  Reviewed in detail and Positive as follows:        Problem Relation Age of Onset    Arthritis Mother     Asthma Mother     Depression Mother     Diabetes Mother     Hearing Loss Mother     Heart Disease Mother     High Blood Pressure Mother     High Cholesterol Mother     Mental Illness Mother     Stroke Mother     Vision Loss Mother     Arthritis Father     Hearing Loss Father     High Blood Pressure Father     High Cholesterol Father     Substance Abuse Father     Vision Loss Father     Arthritis Sister     Depression Sister     High Cholesterol Sister     Mental Retardation Sister     Miscarriages / Stillbirths Sister        REVIEW OF SYSTEMS:   Positive and negative as noted in the HPI. All other systems reviewed and negative. PHYSICAL EXAM:    /75   Pulse 64   Temp 97.8 °F (36.6 °C) (Oral)   Resp 18   Ht 5' 5\" (1.651 m)   Wt 121 lb 14.6 oz (55.3 kg)   SpO2 95%   BMI 20.29 kg/m²     General appearance: moderate distress appears stated age and cooperative. HEENT Normal cephalic, atraumatic without obvious deformity. Pupils equal, round, and reactive to light. Extra ocular muscles intact. Conjunctivae/corneas clear. Neck: Supple, No jugular venous distention/bruits. Trachea midline without thyromegaly or adenopathy with full range of motion. Lungs: Clear to auscultation, bilaterally without Rales/Wheezes/Rhonchi with good respiratory effort. Heart: Regular rate and rhythm with Normal S1/S2 without murmurs, rubs or gallops, point of maximum impulse non-displaced  Abdomen: Soft, non-tender or non-distended without rigidity or guarding and positive bowel sounds all four quadrants. Extremities: No clubbing, cyanosis, or edema bilaterally. Full range of motion without deformity and normal gait intact. Skin: Skin color, texture, turgor normal.  No rashes or lesions. Neurologic: Alert and oriented X 3, numbness on the posterior aspect of RLE down to toes, 3/5 strength  Mental status: Alert, oriented, thought content appropriate.   Capillary refill is brisk  Peripheral pulses 2+    MRI lumbar:  Moderate broad-based posterior disc protrusion at L5-S1. Moderate narrowing   of the neural foramina bilaterally. Mild broad-based annular bulges at L3-4 and L4-5. CBC   Recent Labs     08/31/22 1940   WBC 7.2   HGB 13.5   HCT 40.5         RENAL  Recent Labs     08/31/22 1940      K 4.0      CO2 27   BUN 7   CREATININE 0.7     LFT'S  Recent Labs     08/31/22 1940   AST 17   ALT 16   BILITOT <0.2   ALKPHOS 77     COAG  No results for input(s): INR in the last 72 hours. CARDIAC ENZYMES  No results for input(s): CKTOTAL, CKMB, CKMBINDEX, TROPONINI in the last 72 hours. U/A:    Lab Results   Component Value Date/Time    NITRITE neg 11/20/2017 02:20 PM    COLORU Yellow 08/31/2022 07:40 PM    WBCUA 0-2 08/31/2022 07:40 PM    RBCUA 0-2 08/31/2022 07:40 PM    MUCUS Rare 08/31/2022 07:40 PM    BACTERIA 1+ 08/31/2022 07:40 PM    CLARITYU Clear 08/31/2022 07:40 PM    SPECGRAV <=1.005 08/31/2022 07:40 PM    LEUKOCYTESUR Negative 08/31/2022 07:40 PM    BLOODU Negative 08/31/2022 07:40 PM    GLUCOSEU Negative 08/31/2022 07:40 PM       ABG  No results found for: TRB4OKM, BEART, R5XVBNQK, PHART, THGBART, ERK0QGB, PO2ART, TID1VCF        Active Hospital Problems    Diagnosis Date Noted    Right leg weakness [R29.898] 09/01/2022     Priority: Medium         ASSESSMENT/PLAN:    Lower back pain with right leg numbness and weakness:  Danielle q4h   Consult with neurosurgery  Thoracic MRI pending  Pain control as needed    Migraine headaches:  Gets botox injections as outpatient for maintenance   Will use sumatriptan as needed for breakthrough  B2 if has from home    DVT Prophylaxis: lovenox  Diet: ADULT DIET; Regular  Code Status: Full Code  PT/OT Eval Status: pending    Oralee MD Gilbert    Thank you Mercedes Grajeda MD for the opportunity to be involved in this patient's care. If you have any questions or concerns please feel free to contact me at [phone].

## 2022-09-01 NOTE — PROGRESS NOTES
4 Eyes Skin Assessment     The patient is being assess for  Admission    I agree that 2 RN's have performed a thorough Head to Toe Skin Assessment on the patient. ALL assessment sites listed below have been assessed. Areas assessed by both nurses:   [x]   Head, Face, and Ears   [x]   Shoulders, Back, and Chest  [x]   Arms, Elbows, and Hands   [x]   Coccyx, Sacrum, and Ischum  [x]   Legs, Feet, and Heels        Does the Patient have Skin Breakdown?   No         Amish Prevention initiated:  NA   Wound Care Orders initiated:  NA      [facility] nurse consulted for Pressure Injury (Stage 3,4, Unstageable, DTI, NWPT, and Complex wounds):  NA      Nurse 1 eSignature: Electronically signed by Chester Nuñez RN on 9/1/22 at 6:20 AM EDT    **SHARE this note so that the co-signing nurse is able to place an eSignature**    Nurse 2 eSignature: Electronically signed by Corrina Orellana RN on 8/1/08 at 4:44 AM EDT

## 2022-09-02 ENCOUNTER — APPOINTMENT (OUTPATIENT)
Dept: INTERVENTIONAL RADIOLOGY/VASCULAR | Age: 52
End: 2022-09-02
Attending: INTERNAL MEDICINE
Payer: MEDICARE

## 2022-09-02 VITALS
RESPIRATION RATE: 18 BRPM | SYSTOLIC BLOOD PRESSURE: 115 MMHG | DIASTOLIC BLOOD PRESSURE: 61 MMHG | TEMPERATURE: 97.5 F | BODY MASS INDEX: 20.2 KG/M2 | WEIGHT: 121.25 LBS | HEIGHT: 65 IN | OXYGEN SATURATION: 94 % | HEART RATE: 85 BPM

## 2022-09-02 LAB
INR BLD: 0.98 (ref 0.87–1.14)
PROTHROMBIN TIME: 12.9 SEC (ref 11.7–14.5)

## 2022-09-02 PROCEDURE — 62323 NJX INTERLAMINAR LMBR/SAC: CPT

## 2022-09-02 PROCEDURE — 6360000002 HC RX W HCPCS: Performed by: NURSE PRACTITIONER

## 2022-09-02 PROCEDURE — 6370000000 HC RX 637 (ALT 250 FOR IP): Performed by: INTERNAL MEDICINE

## 2022-09-02 PROCEDURE — 99232 SBSQ HOSP IP/OBS MODERATE 35: CPT | Performed by: NURSE PRACTITIONER

## 2022-09-02 PROCEDURE — 2500000003 HC RX 250 WO HCPCS

## 2022-09-02 PROCEDURE — 97166 OT EVAL MOD COMPLEX 45 MIN: CPT

## 2022-09-02 PROCEDURE — 64483 NJX AA&/STRD TFRM EPI L/S 1: CPT

## 2022-09-02 PROCEDURE — 36415 COLL VENOUS BLD VENIPUNCTURE: CPT

## 2022-09-02 PROCEDURE — G0378 HOSPITAL OBSERVATION PER HR: HCPCS

## 2022-09-02 PROCEDURE — 97161 PT EVAL LOW COMPLEX 20 MIN: CPT

## 2022-09-02 PROCEDURE — 97530 THERAPEUTIC ACTIVITIES: CPT

## 2022-09-02 PROCEDURE — 2709999900 HC NON-CHARGEABLE SUPPLY

## 2022-09-02 PROCEDURE — 6360000002 HC RX W HCPCS

## 2022-09-02 PROCEDURE — 85610 PROTHROMBIN TIME: CPT

## 2022-09-02 PROCEDURE — 2580000003 HC RX 258: Performed by: INTERNAL MEDICINE

## 2022-09-02 PROCEDURE — 97116 GAIT TRAINING THERAPY: CPT

## 2022-09-02 PROCEDURE — 97535 SELF CARE MNGMENT TRAINING: CPT

## 2022-09-02 RX ORDER — LORATADINE 10 MG/1
10 TABLET ORAL DAILY
COMMUNITY

## 2022-09-02 RX ORDER — METHYLPREDNISOLONE 4 MG/1
8 TABLET ORAL NIGHTLY
Status: DISCONTINUED | OUTPATIENT
Start: 2022-09-03 | End: 2022-09-02 | Stop reason: HOSPADM

## 2022-09-02 RX ORDER — METHYLPREDNISOLONE 4 MG/1
4 TABLET ORAL
Status: DISCONTINUED | OUTPATIENT
Start: 2022-09-03 | End: 2022-09-02 | Stop reason: HOSPADM

## 2022-09-02 RX ORDER — TIZANIDINE 4 MG/1
4 TABLET ORAL EVERY 8 HOURS PRN
Qty: 90 TABLET | Refills: 0 | Status: SHIPPED | OUTPATIENT
Start: 2022-09-02 | End: 2022-10-02

## 2022-09-02 RX ORDER — METHYLPREDNISOLONE 4 MG/1
4 TABLET ORAL NIGHTLY
Status: DISCONTINUED | OUTPATIENT
Start: 2022-09-04 | End: 2022-09-02 | Stop reason: HOSPADM

## 2022-09-02 RX ORDER — METHYLPREDNISOLONE 4 MG/1
24 TABLET ORAL ONCE
Status: COMPLETED | OUTPATIENT
Start: 2022-09-02 | End: 2022-09-02

## 2022-09-02 RX ORDER — RIZATRIPTAN BENZOATE 10 MG/1
10 TABLET, ORALLY DISINTEGRATING ORAL
Status: COMPLETED | OUTPATIENT
Start: 2022-09-02 | End: 2022-09-02

## 2022-09-02 RX ORDER — METHYLPREDNISOLONE 4 MG/1
TABLET ORAL
Qty: 14 TABLET | Refills: 0 | Status: SHIPPED | OUTPATIENT
Start: 2022-09-02 | End: 2022-09-07

## 2022-09-02 RX ORDER — LIDOCAINE 4 G/G
1 PATCH TOPICAL DAILY
Qty: 30 PATCH | Refills: 3 | Status: SHIPPED | OUTPATIENT
Start: 2022-09-03

## 2022-09-02 RX ADMIN — OXYCODONE HYDROCHLORIDE AND ACETAMINOPHEN 1 TABLET: 5; 325 TABLET ORAL at 05:12

## 2022-09-02 RX ADMIN — TIZANIDINE 4 MG: 4 TABLET ORAL at 13:30

## 2022-09-02 RX ADMIN — TIZANIDINE 4 MG: 4 TABLET ORAL at 08:29

## 2022-09-02 RX ADMIN — ACETAMINOPHEN 650 MG: 325 TABLET, FILM COATED ORAL at 08:29

## 2022-09-02 RX ADMIN — RIZATRIPTAN BENZOATE 10 MG: 10 TABLET, ORALLY DISINTEGRATING ORAL at 15:11

## 2022-09-02 RX ADMIN — METHYLPREDNISOLONE 24 MG: 4 TABLET ORAL at 06:54

## 2022-09-02 RX ADMIN — SODIUM CHLORIDE, PRESERVATIVE FREE 10 ML: 5 INJECTION INTRAVENOUS at 08:30

## 2022-09-02 RX ADMIN — OXYCODONE HYDROCHLORIDE AND ACETAMINOPHEN 1 TABLET: 5; 325 TABLET ORAL at 11:52

## 2022-09-02 ASSESSMENT — PAIN DESCRIPTION - PAIN TYPE
TYPE: ACUTE PAIN
TYPE: ACUTE PAIN

## 2022-09-02 ASSESSMENT — PAIN SCALES - GENERAL
PAINLEVEL_OUTOF10: 7
PAINLEVEL_OUTOF10: 6
PAINLEVEL_OUTOF10: 3
PAINLEVEL_OUTOF10: 0
PAINLEVEL_OUTOF10: 6
PAINLEVEL_OUTOF10: 5

## 2022-09-02 ASSESSMENT — PAIN DESCRIPTION - FREQUENCY
FREQUENCY: CONTINUOUS

## 2022-09-02 ASSESSMENT — PAIN - FUNCTIONAL ASSESSMENT
PAIN_FUNCTIONAL_ASSESSMENT: PREVENTS OR INTERFERES SOME ACTIVE ACTIVITIES AND ADLS
PAIN_FUNCTIONAL_ASSESSMENT: PREVENTS OR INTERFERES SOME ACTIVE ACTIVITIES AND ADLS

## 2022-09-02 ASSESSMENT — PAIN DESCRIPTION - ORIENTATION
ORIENTATION: LOWER
ORIENTATION: LOWER

## 2022-09-02 ASSESSMENT — PAIN DESCRIPTION - ONSET
ONSET: ON-GOING

## 2022-09-02 ASSESSMENT — PAIN DESCRIPTION - DESCRIPTORS
DESCRIPTORS: ACHING
DESCRIPTORS: ACHING
DESCRIPTORS: ACHING;DULL;DISCOMFORT
DESCRIPTORS: ACHING;DISCOMFORT;DULL
DESCRIPTORS: ACHING

## 2022-09-02 ASSESSMENT — PAIN DESCRIPTION - LOCATION
LOCATION: BACK;HEAD
LOCATION: HEAD
LOCATION: HEAD;BACK
LOCATION: HEAD

## 2022-09-02 NOTE — DISCHARGE SUMMARY
Hospital Medicine Discharge Summary      Patient ID: Rosangela Chaves      Patient's PCP: Kannan Pak MD    Admit Date: 9/1/2022     Discharge Date:   9/2/2022    Admitting Physician: Jazmin Cook MD    Discharge Physician: Jazmin Cook MD     Discharge Diagnoses: Active Hospital Problems    Diagnosis Date Noted    Right leg weakness [R29.898] 09/01/2022     Priority: Medium         The patient was seen and examined on day of discharge and this discharge summary is in conjunction with any daily progress note from day of discharge. Hospital Course:     Patient is a 15-year-old female with medical history of migraine headaches who presented to the hospital for neurosurgical evaluation due to right-sided lower back pain radiating to her right leg, associated with leg weakness and numbness. Patient underwent MRI of cervical spine, thoracic and lumbar spine. She was seen by [place] neurosurgery. Started on Medrol Dosepak and muscle relaxant. Patient also underwent translaminal ZOEY to L5-S1 on 9/2. Patient had immediate response to injection and was able to tolerate PT OT assessment. Cleared for discharge from neurosurgical standpoint and will continue therapy as outpatient. Discharged on burst of Medrol pack and follow-up with [place] neurosurgery in 1 to 2 weeks. As of note cervical spine MRI picked up indeterminant epidural space lesion at C6 which was asymptomatic and likely related to disc bulge disease. Patient will follow-up with neurosurgery regarding further management. Consults:     IP CONSULT TO NEUROSURGERY  IP CONSULT TO PHARMACY    Disposition: Home     Discharged Condition: Stable    Code Status: Full Code    Activity: activity as tolerated    Diet: regular diet    Follow Up: Primary Care Physician in one week    Exam:     General appearance: No apparent distress, appears stated age and cooperative.   Lungs: Clear to ascultation, bilaterally without Rales/Wheezes/Rhonchi with good respiratory effort. Heart: Regular rate and rhythm with Normal S1/S2 without  murmurs, rubs or gallops, point of maximum impulse non-displaced  Abdomen: Soft, non-tender or non-distended without rigidity or guarding and positive bowel sounds all four quadrants. Extremities: No clubbing, cyanosis, or edema bilaterally. Skin: Skin color, texture, turgor normal.   Neurologic: Alert and oriented X 3, improving strength of right lower extremity  Mental status: Alert, oriented, thought content appropriate      Labs: For convenience and continuity at follow-up the following most recent labs are provided:    CBC:   Lab Results   Component Value Date/Time    WBC 7.2 08/31/2022 07:40 PM    HGB 13.5 08/31/2022 07:40 PM    HCT 40.5 08/31/2022 07:40 PM     08/31/2022 07:40 PM       RENAL:   Lab Results   Component Value Date/Time     08/31/2022 07:40 PM    K 4.0 08/31/2022 07:40 PM    K 3.8 04/12/2018 12:09 AM     08/31/2022 07:40 PM    CO2 27 08/31/2022 07:40 PM    BUN 7 08/31/2022 07:40 PM    CREATININE 0.7 08/31/2022 07:40 PM           Discharge Medications:   Current Discharge Medication List             Details   lidocaine 4 % external patch Place 1 patch onto the skin daily  Qty: 30 patch, Refills: 3      tiZANidine (ZANAFLEX) 4 MG tablet Take 1 tablet by mouth every 8 hours as needed (muscle spasms)  Qty: 90 tablet, Refills: 0      methylPREDNISolone (MEDROL) 4 MG tablet Take 1 tablet by mouth 4 times daily for 2 days, THEN 1 tablet 3 times daily for 1 day, THEN 1 tablet 2 times daily for 1 day, THEN 1 tablet daily for 1 day. Qty: 14 tablet, Refills: 0                Details   loratadine (CLARITIN) 10 MG tablet Take 10 mg by mouth daily      Multiple Vitamins-Minerals (VITAMIN D3 COMPLETE PO) Take by mouth daily      oxyCODONE-acetaminophen (PERCOCET) 5-325 MG per tablet Take 1 tablet by mouth every 6 hours as needed for Pain.       albuterol sulfate  (90 Base) MCG/ACT inhaler Inhale 2 puffs into the lungs every 4 hours as needed for Wheezing or Shortness of Breath (Space out to every 6 hours as symptoms improve)  Qty: 1 each, Refills: 0      rizatriptan (MAXALT) 10 MG tablet TAKE 1 TABLET BY MOUTH ONCE AS NEEDED FOR MIGRAINE MAY REPEAT IN 1-2 HOURS IF NEEDED  Qty: 12 tablet, Refills: 3    Associated Diagnoses: Migraine with aura and without status migrainosus, not intractable      Omega-3 Fatty Acids (FISH OIL PO) Take by mouth                Time Spent on discharge is more than 30 minutes in the examination, evaluation, counseling and review of medications and discharge plan. Signed:  Billy Weiner MD   9/2/2022      Thank you Sunil Krishnan MD for the opportunity to be involved in this patient's care. If you have any questions or concerns please feel free to contact me at [phone].

## 2022-09-02 NOTE — CONSULTS
Clinical Pharmacy Progress Note  Medication History     Admit Date: 9/1/2022    Pharmacy consulted to verify home medication list by Dr. Christy Durham. List of of current medications patient is taking is complete. Home Medication list in EPIC updated to reflect changes noted below. Source of information: Rx fill history, interview with patient    Patient's home pharmacy: [facility]     Changes made to medication list:   Medications removed: (include reason, ex: therapy completed, patient no longer taking, etc.)  Bupropion - pt reports she does not take  Eletriptan - takes Rizatriptan  Lyrica - pt states she does not take  Methylprednisone - Rx from 2019; pt does not take  Montelukast - pt does not take  Medications added:   Loratadine   Vitamin D - unsure of dose    Current Outpatient Medications   Medication Instructions    albuterol sulfate  (90 Base) MCG/ACT inhaler 2 puffs, Inhalation, EVERY 4 HOURS PRN    Omega-3 Fatty Acids (FISH OIL PO) Oral    oxyCODONE-acetaminophen (PERCOCET) 5-325 MG per tablet 1 tablet, Oral, EVERY 6 HOURS PRN    predniSONE (DELTASONE) 10 mg, Oral, SEE ADMIN INSTRUCTIONS, Take 4 tablets ×4 days<BR>Take 3 tablets ×3 days<BR>Take 2 tablets ×2 days<BR>Take 1 tablet ×1 day    rizatriptan (MAXALT) 10 MG tablet TAKE 1 TABLET BY MOUTH ONCE AS NEEDED FOR MIGRAINE MAY REPEAT IN 1-2 HOURS IF NEEDED       Please call with any questions.   Gonzalez Bliss PharmD, BCPS  Wireless: [phone]   9/2/2022 12:24 PM

## 2022-09-02 NOTE — ADT AUTH CERT
Utilization Reviews       Back Pain - Care Day 2 (9/2/2022) by Emory Ramos RN       Review Status Review Entered   Completed 9/2/2022 16:52      Criteria Review      Care Day: 2 Care Date: 9/2/2022 Level of Care: Intermediate Care    Guideline Day 2    Level Of Care    ( ) Floor    9/2/2022 4:52 PM EDT by Shabnam Miranda      PCU    Clinical Status    ( ) * Pain control improved    9/2/2022 4:52 PM EDT by Shabnam Miranda      ZOEY done, but PS scores remains high    (X) * New neurologic deficits absent    9/2/2022 4:52 PM EDT by Shabnam Miranda      none noted    ( ) * Acute etiologies requiring continued inpatient care absent    9/2/2022 4:52 PM EDT by Shabnam Miranda      broad-based posterior disc protrusion at L5-S1. Moderate narrowing  of the neural foramina bilaterally. Activity    (X) Possible ambulation with assistance    9/2/2022 4:52 PM EDT by Shabnam Miranda      SBA sit><stand, needs SBA/CGA for functional transfers    Routes    ( ) Diet as tolerated    9/2/2022 4:52 PM EDT by Shabnam Miranda      minimal po    (X) Oral or parenteral medications    9/2/2022 4:52 PM EDT by Shabnam Miranda      Medrol 24mg po x1  Zanaflex 4mg 3x daily po    Medications    (X) * PCA absent    9/2/2022 4:52 PM EDT by Shabnam Miranda      none noted    (X) Parenteral or oral analgesics    9/2/2022 4:52 PM EDT by Shabnam Miranda      tylenol 650mg q6h prn po x1  percocet 5-325 q6h prn po x2  rizatriptan 10mg prn po x1    * Milestone   Additional Notes   DATE:    9/2 (PCU)      PERTINENT UPDATES:   - still continues to report back and R leg pain and also now with migrane   - had ZOEY    - noted to have minimal PO      VITALS:   T 97.6 (36.4)   RR 20   MA 91   /80   SPO2 93% RA   PS 5-7/10      ABNL/PERTINENT LABS/RADIOLOGY/DIAGNOSTIC STUDIES:   IR INJ INTERLAMINAR EPI/SUBARACHNOID LUMB/SAC    DIAGNOSIS :   L5-S1 translaminar epidural injection of Celestone and Sensorcaine.    Right S1 transforaminal epidural injection of Celestone and Sensorcaine. Pre-procedure pain score : 10/10   Post-procedure pain score : Could not accurately be assessed due to pain caused by injection of steroid and saline surrounding an acutely inflamed nerve root. This will be evaluated at the time of the patient's scheduled 1 week post-procedure assessment. PHYSICAL EXAM:   Sensory: Decreased sensation over right posterior thigh, otherwise sensation intact   Sensory: Motor strength: Right Knee Flexors, Ankle Dorsiflex, Ankle Plantarflex. Ext Jimi Franks 4/5      MD CONSULTS/ASSESSMENT AND PLAN:   **NEURO SX NOTE   ZOEY   NPO   coags   Medrol Dosepak   Muscle relaxers - Tizanidine    She can follow up in 2 weeks with Hanna   WE WILL SIGN OFF AT THIS TIME.   dISPO: UP TO MEDICINE ONCE SHE GETS HER ZOEY      **IM NOTE   ASSESSMENT AND PLAN     Lower back pain with right leg numbness and weakness:   Danielle q4h    Consulted with neurosurgery, advised on ZOEY to L5S1 translaminal injection, had procedure this morning   Medrol dose pack   Tizanidine scheduled and percocet as needed   PT/OT today       Abnormal cervical spine MRI   Per report- epidural space mass at C6, differential includes disk extrusion with hemorrhage   Will follow up with neurosurgery regarding findings       Migraine headaches:   Gets botox injections as outpatient for maintenance    Patient reports we do not have correct medications- will consult pharmacy to reconcile         ORDERS:   Neuro checks  EVERY 4 HOURS     Vital signs per unit routine  EVERY 4 HOURS         PT/OT/SLP/CM ASSESSMENT OR NOTES:    **OT NOTE   21/24 on the AM-PAC ADL    Currently, she is SBA sit><stand, needs SBA/CGA for functional transfers, and needs Shelby with LE dressing. She resides with family, and they are able to assist with all ADLs and IADLs. No further acute OT needs, d/c OT. Recommend discharge to home with initial 24 hr A.      **PT NOTE   20/24 on the AM-PAC    Pt functioning below baseline.  However, anticipate good progress while here & that pt will go home upon D/C. Recommend st cane for home & outpt PT. Will cont PT while here to maximize independence. **DIETARY NOTE   Nutrition Recommendations/Plan:    1. ADAT per MD   2.  Monitor nutrition adequacy, pertinent labs, bowel habits, wt changes, and clinical progress

## 2022-09-02 NOTE — PROGRESS NOTES
Occupational Therapy  Facility/Department: [place] [address] [facility]U  Occupational Therapy Initial Assessment/tx/discharge    Name: Luke Alejandro  : 1970  MRN: [id number]  Date of Service: 2022    Discharge Recommendations:  Luke Alejandro scored a 21/24 on the AM-PAC ADL Inpatient form. Current research shows that an AM-PAC score of 18 or greater is typically associated with a discharge to the patient's home setting. Please see assessment section for further patient specific details. If patient discharges prior to next session this note will serve as a discharge summary. Please see below for the latest assessment towards goals. OT Equipment Recommendations  Equipment Needed: No       Patient Diagnosis(es): There were no encounter diagnoses. Past Medical History:  has a past medical history of Alcohol abuse, DDD (degenerative disc disease), lumbosacral, Depression, Gastritis, Heart murmur, Heart palpitations, Migraine, Osteoarthritis, Overactive bladder, SCIATIC NERVE DISEASE, and Subarachnoid hemorrhage ([facility] [address] Past Surgical History:  has a past surgical history that includes Tubal ligation; Tonsillectomy; brain surgery; Endometrial ablation; and Cholecystectomy (01/15/2018). Assessment   Assessment: Pt admitted with low back and R LE pain. She received [facility]. Currently, she is SBA sit><stand, needs SBA/CGA for functional transfers, and needs Shelby with LE dressing. She resides with family, and they are able to assist with all ADLs and IADLs. No further acute OT needs, d/c OT. Recommend discharge to home with initial 24 hr A.   Prognosis: Good  Decision Making: Medium Complexity  REQUIRES OT FOLLOW-UP: No  Activity Tolerance  Activity Tolerance: Patient Tolerated treatment well        Plan   Plan  Plan Comment: d/c OT     Restrictions  Position Activity Restriction  Other position/activity restrictions: Up as tolerated, Up with assist    Subjective   General  Chart Reviewed: Yes  Additional Pertinent Hx: PMH:  migraine, sciatica, alcohol abuse, DDD, depression, sciatic nerve dz, SAH with brain surgery  Family / Caregiver Present: No  Referring Practitioner: Dr. Sterling Sarmiento  Diagnosis: Pt transferred from [facility] with low back pain, R LE weakness and numbness-lumbar spine MRI=disc protrusion L5-S1, annular bulges L3-4, L4-5, thoracic MRI=no canal or foraminal stenosis, MRI cervical spine=probable compression of L C7 nerve root, no cord compression-9/2 pt had ZOEY  Subjective  Subjective: Pt in bed, agreeable to work with OT. Social/Functional History  Social/Functional History  Lives With: Spouse (& parents)  Type of Home: House  Home Layout: Two level, Bed/Bath upstairs (full bath on 1st)  Home Access: Stairs to enter with rails, Stairs to enter without rails  Entrance Stairs - Number of Steps: 2 without rails in back, 2 with rails in front  Bathroom Shower/Tub: Tub/Shower unit  [employer]: Standard (next to sink)  Bathroom Equipment: Grab bars in shower, Shower chair  Home Equipment: Walker, rolling  Has the patient had two or more falls in the past year or any fall with injury in the past year?: No  ADL Assistance: Independent  Homemaking Assistance: Independent  Ambulation Assistance: Independent (using st cane for past 4  days d/t right LE pain/weakness)  Transfer Assistance: Independent  Active : Yes  Occupation: On disability  Additional Comments:  does not work, can assist as needed. pt normally cares for her parents. Objective                Safety Devices  Type of Devices: Left in chair;Call light within reach; Chair alarm in place;Nurse notified  Balance  Sitting: Intact  Standing:  (SBA/S)  Gait  Overall Level of Assistance:  (Functional mobility with cane, SBA/CGA)  Toilet Transfers  Toilet - Technique: Ambulating (with cane and CGA/SBA to/from bathroom)  Equipment Used: Standard toilet (grab bar)  Toilet Transfer: Contact guard assistance;Stand by assistance (CGA/SBA)     ADL  LE Dressing: Minimal assistance (Shelby donning L shoe, indep with R)        Bed mobility  Supine to Sit: Modified independent (head of bed elevated)  Scooting: Independent  Transfers  Sit to stand: Stand by assistance  Stand to sit: Stand by assistance  Vision  Vision: Within Functional Limits  Hearing  Hearing: Within functional limits  Cognition  Overall Cognitive Status: WNL  Orientation  Overall Orientation Status: Within Normal Limits                  Education Given To: Patient  Education Provided: Role of Therapy;Plan of Care  Education Method: Verbal  Barriers to Learning: None  Education Outcome: Verbalized understanding  LUE AROM (degrees)  LUE AROM : WFL  Left Hand AROM (degrees)  Left Hand AROM: WFL  RUE AROM (degrees)  RUE AROM : WFL  Right Hand AROM (degrees)  Right Hand AROM: WFL        Hand Dominance  Hand Dominance: Right            G-Code     OutComes Score                                                  AM-PAC Score        AM-[facility] Inpatient Daily Activity Raw Score: 21 (09/02/22 1203)  AM-PAC Inpatient ADL T-Scale Score : 44.27 (09/02/22 1203)  ADL Inpatient CMS 0-100% Score: 32.79 (09/02/22 1203)  ADL Inpatient CMS G-Code Modifier : CJ (09/02/22 1203)                     Therapy Time   Individual Concurrent Group Co-treatment   Time In 1110         Time Out 1148         Minutes 38          Timed Code Treatment Minutes:   23    Total Treatment Minutes:   [address], OTR/L 7785

## 2022-09-02 NOTE — PROGRESS NOTES
Discharge instructions, medication regimen, prescriptions and follow-up appointments explained and given to pt. All questions answered. Pt verbalizes understanding when to seek medical attention. IV removed with tip intact. Telemonitor discontinued. Pt stable at discharge.

## 2022-09-02 NOTE — PLAN OF CARE
Problem: Pain  Goal: Verbalizes/displays adequate comfort level or baseline comfort level  Outcome: Progressing  Flowsheets (Taken 9/2/2022 0541)  Verbalizes/displays adequate comfort level or baseline comfort level:   Encourage patient to monitor pain and request assistance   Administer analgesics based on type and severity of pain and evaluate response   Assess pain using appropriate pain scale   Implement non-pharmacological measures as appropriate and evaluate response   Notify Licensed Independent Practitioner if interventions unsuccessful or patient reports new pain  Note: Pt continues to report back and R leg pain. Pt has Percocet PRN q6h for moderate pain. Will continue to monitor pt's pain level. Problem: Safety - Adult  Goal: Free from fall injury  Outcome: Progressing  Flowsheets (Taken 9/1/2022 2241)  Free From Fall Injury: Instruct family/caregiver on patient safety  Note: Pt will remain free from accidental injury this shift. Pt has fall risk measures (fall risk bracelet, fall risk sign, fall risk cloth, non-slip socks, dome light on) in place. Pt bed is in low position, bed alarm on, bed wheels locked, call light within reach, bedside table within reach, chair wheels locked, chair alarm on. Problem: ABCDS Injury Assessment  Goal: Absence of physical injury  Outcome: Progressing  Flowsheets (Taken 9/1/2022 2241)  Absence of Physical Injury: Implement safety measures based on patient assessment     Problem: Neurosensory - Adult  Goal: Achieves stable or improved neurological status  Outcome: Progressing  Flowsheets (Taken 9/2/2022 0528)  Achieves stable or improved neurological status:   Assess for and report changes in neurological status   Initiate measures to prevent increased intracranial pressure   Maintain blood pressure and fluid volume within ordered parameters to optimize cerebral perfusion and minimize risk of hemorrhage   Monitor temperature, glucose, and sodium.  Initiate appropriate interventions as ordered  Note: Neuro checks are being performed every four hours. Problem: Respiratory - Adult  Goal: Achieves optimal ventilation and oxygenation  Outcome: Progressing  Flowsheets (Taken 9/2/2022 0549)  Achieves optimal ventilation and oxygenation:   Assess for changes in respiratory status   Position to facilitate oxygenation and minimize respiratory effort   Initiate smoking cessation protocol as indicated   Assess the need for suctioning and aspirate as needed   Respiratory therapy support as indicated   Assess for changes in mentation and behavior   Oxygen supplementation based on oxygen saturation or arterial blood gases   Encourage broncho-pulmonary hygiene including cough, deep breathe, incentive spirometry   Assess and instruct to report shortness of breath or any respiratory difficulty  Note: Pt remains on RA and SpO2 has been WNL. Problem: Cardiovascular - Adult  Goal: Maintains optimal cardiac output and hemodynamic stability  Outcome: Progressing  Flowsheets (Taken 9/2/2022 0549)  Maintains optimal cardiac output and hemodynamic stability:   Monitor blood pressure and heart rate   Monitor urine output and notify Licensed Independent Practitioner for values outside of normal range   Assess for signs of decreased cardiac output  Note: Pt is on continuous telemetry and heart rhythm is NSR.

## 2022-09-02 NOTE — PROGRESS NOTES
Comprehensive Nutrition Assessment    Type and Reason for Visit:  Positive Nutrition Screen, Initial    Nutrition Recommendations/Plan:   ADAT per MD  Monitor nutrition adequacy, pertinent labs, bowel habits, wt changes, and clinical progress     Malnutrition Assessment:  Malnutrition Status:  Insufficient data (09/02/22 1249)    Context:  Acute Illness       Nutrition Assessment:    Positive nutrition screen: Pt admitted w/ right leg weakness. Currently NPO, pending neurosurgery consult. Pt had previously been on a Regular diet w/ intakes of %. No significant wt loss shown in EMR for 1 year period. Pt reports that at baseline she has always only eaten 1 meal per day and that her UBW is between 120-130#. Her appetite is good and has no c/o N/V. Pt does c/o constipation, RD offered prune juice and pt said she would wait to see if she has a BM today. Will continue to monitor. Nutrition Related Findings:    Labs reviewed. Wound Type: None       Current Nutrition Intake & Therapies:    Average Meal Intake: NPO  Average Supplements Intake: NPO  ADULT DIET; Regular    Anthropometric Measures:  Height: 5' 5\" (165.1 cm)  Ideal Body Weight (IBW): 125 lbs (57 kg)       Current Body Weight: 121 lb 4.1 oz (55 kg), 97 % IBW. Weight Source: Standing Scale  Current BMI (kg/m2): 20.2        Weight Adjustment For: No Adjustment                 BMI Categories: Normal Weight (BMI 18.5-24. 9)      Nutrition Diagnosis:   Predicted inadequate energy intake related to inadequate protein-energy intake as evidenced by poor intake prior to admission    Nutrition Interventions:   Food and/or Nutrient Delivery: Start Oral Diet  Nutrition Education/Counseling: Education not indicated  Coordination of Nutrition Care: Continue to monitor while inpatient       Goals:  Previous Goal Met: Progressing toward Goal(s)  Goals: Initiate PO diet, within 2 days       Nutrition Monitoring and Evaluation:   Behavioral-Environmental Outcomes: None Identified  Food/Nutrient Intake Outcomes: Diet Advancement/Tolerance  Physical Signs/Symptoms Outcomes: Biochemical Data, Nutrition Focused Physical Findings, Weight    Discharge Planning:     Too soon to determine     Marylene Monaco, [address]  Contact: [phone]

## 2022-09-02 NOTE — PROGRESS NOTES
independence. Treatment Diagnosis: decreased functional mobility. Therapy Prognosis: Good  Decision Making: Low Complexity  Requires PT Follow-Up: Yes  Activity Tolerance  Activity Tolerance: Patient tolerated evaluation without incident;Patient limited by pain     Plan   Plan  Plan:  (2-5)  Current Treatment Recommendations: Functional mobility training, Transfer training, Gait training, Stair training, Safety education & training  Safety Devices  Type of Devices: Left in chair, Call light within reach, Chair alarm in place, Nurse notified     Restrictions  Position Activity Restriction  Other position/activity restrictions: Up as tolerated, Up with assist     Subjective   Pain: pt c/o back pain & headache. RN informed. General  Chart Reviewed: Yes  Patient assessed for rehabilitation services?: Yes  Additional Pertinent Hx: Pt to [facility] ED 8/31 with worsening lower back pain, radiating down to right leg. Pt transferred to [facility] for Neurosurgery consult. Lspine MRI: Moderate broad-based posterior disc protrusion at L5-S1. PMH:  sciatica, depression, OA, migraines, ETOH abuse, DDD, SAH. s/p ZOEY 9/2. Family / Caregiver Present: No  Referring Practitioner: Clarissa Dowell  Referral Date : 09/01/22  Diagnosis: R LE weakness  Follows Commands: Within Functional Limits  Subjective  Subjective: Pt supine in bed & agreeable to PT.          Social/Functional History  Social/Functional History  Lives With: Spouse (& parents)  Type of Home: House  Home Layout: Two level, Bed/Bath upstairs (full bath on 1st)  Home Access: Stairs to enter with rails, Stairs to enter without rails  Entrance Stairs - Number of Steps: 2 without rails in back, 2 with rails in front  Bathroom Shower/Tub: Tub/Shower unit  [employer]: Standard (next to sink)  Bathroom Equipment: Grab bars in shower, Shower chair  Home Equipment: Walker, rolling  Has the patient had two or more falls in the past year or any fall with injury in the past year?: No  ADL Assistance: Independent  Homemaking Assistance: Independent  Ambulation Assistance: Independent (using st cane for past 4  days d/t right LE pain/weakness)  Transfer Assistance: Independent  Active : Yes  Occupation: On disability  Additional Comments:  does not work, can assist as needed. pt normally cares for her parents. Vision/Hearing  Vision  Vision: Within Functional Limits  Hearing  Hearing: Within functional limits    Cognition   Orientation  Overall Orientation Status: Within Normal Limits  Cognition  Overall Cognitive Status: WNL     Objective   Heart Rate: 91  Heart Rate Source: Monitor  BP: 131/80  BP Location: Left upper arm  BP Method: Automatic  Patient Position: Sitting  MAP (Calculated): 97  Resp: 16  SpO2: 95 %  O2 Device: None (Room air)              AROM RLE (degrees)  RLE AROM: WFL  AROM LLE (degrees)  LLE AROM : WFL  Strength RLE  Comment: hip flex: 3+/5, knee ext: 4/5, knee flex: 4/5. ankle DF: 3+/5  Strength LLE  Strength LLE: WFL        Bed Mobility Training  Bed Mobility Training: Yes  Rolling: Independent  Supine to Sit: Modified independent  Scooting: Independent  Balance  Sitting: Intact  Standing:  (static; SUPV, dynamic: CGA/SBA with st cane)  Transfer Training  Transfer Training: Yes  Sit to Stand: Stand-by assistance (from bed, toilet using bar, chair)  Stand to Sit: Stand-by assistance  Gait Training  Gait Training: Yes  Gait  Overall Level of Assistance:  (pt amb 10 ft x 2, 120 ft with st cane & CGA/SBA. slow, antalgic, step-to gait. no LOB. )                                                                        AM-PAC Score  AM-PAC Inpatient Mobility Raw Score : 20 (09/02/22 1229)  AM-PAC Inpatient T-Scale Score : 47.67 (09/02/22 1229)  Mobility Inpatient CMS 0-100% Score: 35.83 (09/02/22 1229)  Mobility Inpatient CMS G-Code Modifier : CJ (09/02/22 1229)             Goals  Short Term Goals  Time Frame for Short term goals: D/C  Short term goal 1: sit<->stand SUPV  Short term goal 2: Amb 150 ft with/without AAD SUPV  Short term goal 3: pt will tolerate stair assessment  Patient Goals   Patient goals : to go home       Education  Patient Education  Education Given To: Patient  Education Provided: Role of Therapy;Plan of Care;Transfer Training (gait training)  Education Method: Demonstration;Verbal  Education Outcome: Verbalized understanding;Demonstrated understanding      Therapy Time   Individual Concurrent Group Co-treatment   Time In 1110         Time Out 1148         Minutes [address], PT

## 2022-09-02 NOTE — PROGRESS NOTES
Occupational Therapy/Physical Therapy    OT/PT orders received, chart reviewed, currently pt is off of floor. Will follow up later as schedule allows.   Mago Bailey, OTR/L [id number]

## 2022-09-02 NOTE — PLAN OF CARE
Problem: Discharge Planning  Goal: Discharge to home or other facility with appropriate resources  Outcome: Progressing     Problem: Pain  Goal: Verbalizes/displays adequate comfort level or baseline comfort level  9/2/2022 1056 by Mahsa Grossman RN  Flowsheets (Taken 9/2/2022 1056)  Verbalizes/displays adequate comfort level or baseline comfort level:   Encourage patient to monitor pain and request assistance   Assess pain using appropriate pain scale  9/2/2022 0549 by Chester Nuñez  Outcome: Progressing  Flowsheets (Taken 9/2/2022 0541)  Verbalizes/displays adequate comfort level or baseline comfort level:   Encourage patient to monitor pain and request assistance   Administer analgesics based on type and severity of pain and evaluate response   Assess pain using appropriate pain scale   Implement non-pharmacological measures as appropriate and evaluate response   Notify Licensed Independent Practitioner if interventions unsuccessful or patient reports new pain  Note: Pt continues to report back and R leg pain. Pt has Percocet PRN q6h for moderate pain. Will continue to monitor pt's pain level.      Problem: ABCDS Injury Assessment  Goal: Absence of physical injury  9/2/2022 1056 by Mahsa Grossman RN  Outcome: Progressing  Flowsheets (Taken 9/2/2022 1056)  Absence of Physical Injury: Implement safety measures based on patient assessment  9/2/2022 0549 by Chester Nuñez  Outcome: Progressing  Flowsheets (Taken 9/1/2022 2241)  Absence of Physical Injury: Implement safety measures based on patient assessment

## 2022-09-02 NOTE — PROGRESS NOTES
Hospitalist Progress Note      PCP: Clemencia Gilford, MD    Date of Admission: 2022    Chief Complaint on Admission: back and RLE pain, weakness    Pt Seen/Examined and Chart Reviewed. Admitting dx as above    SUBJECTIVE/OBJECTIVE:   Seen after ZOEY- leg feels better today  About to start PT/OT session  Reports migraine and her home medications were lost somewhere at the hospital.     Allergies  Ibuprofen, Vicodin [hydrocodone-acetaminophen], and Tramadol    Medications      Scheduled Meds:   [START ON 9/3/2022] methylPREDNISolone  4 mg Oral QAM AC    [START ON 9/3/2022] methylPREDNISolone  4 mg Oral Lunch    [START ON 9/3/2022] methylPREDNISolone  4 mg Oral Dinner    [START ON 9/3/2022] methylPREDNISolone  8 mg Oral Nightly    [START ON 2022] methylPREDNISolone  4 mg Oral Nightly    sodium chloride flush  5-40 mL IntraVENous 2 times per day    [Held by provider] enoxaparin  40 mg SubCUTAneous Daily    tiZANidine  4 mg Oral TID    lidocaine  1 patch TransDERmal Daily    nicotine  1 patch TransDERmal Daily       Infusions:   sodium chloride         PRN Meds:  oxyCODONE-acetaminophen, sodium chloride flush, sodium chloride, ondansetron **OR** ondansetron, polyethylene glycol, acetaminophen **OR** acetaminophen, morphine, diphenhydrAMINE    Vitals    TEMPERATURE:  Current - Temp: 97.8 °F (36.6 °C);  Max - Temp  Av.9 °F (36.6 °C)  Min: 97.6 °F (36.4 °C)  Max: 98.2 °F (36.8 °C)  RESPIRATIONS RANGE: Resp  Av.3  Min: 14  Max: 20  PULSE RANGE: Pulse  Av.5  Min: 56  Max: 70  BLOOD PRESSURE RANGE:  Systolic (43NXK), HEL:471 , Min:118 , TWO:937   ; Diastolic (29XUQ), PGF:93, Min:52, Max:75    PULSE OXIMETRY RANGE: SpO2  Av.2 %  Min: 94 %  Max: 97 %  24HR INTAKE/OUTPUT:    Intake/Output Summary (Last 24 hours) at 2022 1121  Last data filed at 2022 0840  Gross per 24 hour   Intake 370 ml   Output --   Net 370 ml       Exam:      General appearance: No apparent distress, appears stated age and

## 2022-09-02 NOTE — CARE COORDINATION
CM spoke with patient at bedside. She plans to go home with spouse and get outpatient PT. Patient is agreeable to standard cane from [facility], to be delivered to room. Family to transport home.     Andrei Price RN, BSN,   4th Floor Progressive Care Unit  [phone]

## 2022-09-02 NOTE — PROGRESS NOTES
NEUROSURGERY PROGRESS NOTE    Patient Name: Juan Jerry YOB: 1970   Sex: Female Age: 46 yrs     Medical Record Number: [id number] Acct Number: [de-identified]   Room Number: 4182/6521-45 Hospital Day: Hospital Day: 2         Subjective: still having R leg pain and numbness and weakness. She has had ZOEY in past and it helped for a little while.      Objective:    VITAL SIGNS   /75   Pulse 70   Temp 97.8 °F (36.6 °C) (Oral)   Resp 14   Ht 5' 5\" (1.651 m)   Wt 121 lb 4.1 oz (55 kg)   SpO2 96%   BMI 20.18 kg/m²    Height Height: 5' 5\" (165.1 cm)   Weight Weight: 121 lb 4.1 oz (55 kg)        Allergies Allergies   Allergen Reactions    Ibuprofen      headache    Vicodin [Hydrocodone-Acetaminophen]     Tramadol Nausea And Vomiting      NPO Status Diet NPO   Isolation No active isolations     LABS   Basic Metabolic Profile Recent Labs     08/31/22 1940         CO2 27   BUN 7   CREATININE 0.7   GLUCOSE 94      Complete Blood Count Recent Labs     08/31/22 1940   WBC 7.2   RBC 4.34      Coagulation Studies Recent Labs     09/02/22  0748   INR 0.98        MEDICATIONS   Inpatient Medications     [START ON 9/3/2022] methylPREDNISolone, 4 mg, Oral, QAM AC    [START ON 9/3/2022] methylPREDNISolone, 4 mg, Oral, Lunch    [START ON 9/3/2022] methylPREDNISolone, 4 mg, Oral, Dinner    [START ON 9/3/2022] methylPREDNISolone, 8 mg, Oral, Nightly    [START ON 9/4/2022] methylPREDNISolone, 4 mg, Oral, Nightly    sodium chloride flush, 5-40 mL, IntraVENous, 2 times per day    [Held by provider] enoxaparin, 40 mg, SubCUTAneous, Daily    tiZANidine, 4 mg, Oral, TID    lidocaine, 1 patch, TransDERmal, Daily    nicotine, 1 patch, TransDERmal, Daily   Infusions    sodium chloride        Antibiotics   Recent Abx Admin        No antibiotic orders with administrations found. Neurologic Exam:  Mental status: awake and alert and oriented x4    Musculoskeletal:   Gait: Not tested   Tone: normal  Sensory:Decreased sensation over right posterior thigh, otherwise sensation intact  Sensory: Motor strength:    Right  Left    Right  Left    Deltoid  5 5  Hip Flex  5 5   Biceps  5 5  Knee Extensors  5 5   Triceps  5 5  Knee Flexors  4 5   Wrist Ext  5 5  Ankle Dorsiflex. 4 5   Wrist Flex  5 5  Ankle Plantarflex. 4 5   Handgrip  5 5  Ext Jimi Longus  4 5   Thumb Ext  5 5       Cardiovascular: Warm, appears well perfused   Respiratory: Easy, non-labored respiratory pattern   Abdominal: Abdomen is without distention   Extremities: Upper and lower extremities are atraumatic in appearance without deformity      Assessment   Patient is a 49-year-old female with right lower extremity numbness, weakness and intermittent pain. PLAN:  ZOEY  NPO  coags  Medrol Dosepak  Muscle relaxers - Tizanidine   She can follow up in 2 weeks with Hanna  WE WILL SIGN OFF AT THIS TIME.  dISPO: UP TO MEDICINE ONCE SHE GETS HER ZOEY  Patient was discussed with Dr. Santa Le who agrees with above assessment and plan. I spent 25 minutes in the care of this patient. Over 50% of that time was in face-to-face counseling regarding disease process, diagnostic testing, preventative measures, and answering patient and family questions. Electronically signed by:  LV Ramirez CNP, 9/2/2022 9:12 AM   Neurosurgery Nurse Practitioner  [phone]

## 2023-05-23 ENCOUNTER — OFFICE VISIT (OUTPATIENT)
Dept: FAMILY MEDICINE CLINIC | Age: 53
End: 2023-05-23
Payer: COMMERCIAL

## 2023-05-23 VITALS
HEART RATE: 77 BPM | DIASTOLIC BLOOD PRESSURE: 72 MMHG | SYSTOLIC BLOOD PRESSURE: 126 MMHG | WEIGHT: 123.2 LBS | OXYGEN SATURATION: 98 % | BODY MASS INDEX: 20.5 KG/M2

## 2023-05-23 DIAGNOSIS — Z76.89 ENCOUNTER TO ESTABLISH CARE: Primary | ICD-10-CM

## 2023-05-23 DIAGNOSIS — M54.50 CHRONIC MIDLINE LOW BACK PAIN, UNSPECIFIED WHETHER SCIATICA PRESENT: ICD-10-CM

## 2023-05-23 DIAGNOSIS — F10.11 ALCOHOL ABUSE, IN REMISSION: ICD-10-CM

## 2023-05-23 DIAGNOSIS — R68.89 UNINTENTIONAL WEIGHT CHANGE: ICD-10-CM

## 2023-05-23 DIAGNOSIS — G43.109 MIGRAINE WITH AURA AND WITHOUT STATUS MIGRAINOSUS, NOT INTRACTABLE: ICD-10-CM

## 2023-05-23 DIAGNOSIS — R79.9 ABNORMAL FINDING OF BLOOD CHEMISTRY, UNSPECIFIED: ICD-10-CM

## 2023-05-23 DIAGNOSIS — M54.12 CERVICAL RADICULOPATHY, ACUTE: ICD-10-CM

## 2023-05-23 DIAGNOSIS — Z76.89 ENCOUNTER TO ESTABLISH CARE: ICD-10-CM

## 2023-05-23 DIAGNOSIS — G89.29 CHRONIC MIDLINE LOW BACK PAIN, UNSPECIFIED WHETHER SCIATICA PRESENT: ICD-10-CM

## 2023-05-23 DIAGNOSIS — M15.9 PRIMARY OSTEOARTHRITIS INVOLVING MULTIPLE JOINTS: ICD-10-CM

## 2023-05-23 LAB
ALBUMIN SERPL-MCNC: 4.9 G/DL (ref 3.4–5)
ALP SERPL-CCNC: 75 U/L (ref 40–129)
ALT SERPL-CCNC: 18 U/L (ref 10–40)
AST SERPL-CCNC: 20 U/L (ref 15–37)
BILIRUB DIRECT SERPL-MCNC: <0.2 MG/DL (ref 0–0.3)
BILIRUB INDIRECT SERPL-MCNC: NORMAL MG/DL (ref 0–1)
BILIRUB SERPL-MCNC: <0.2 MG/DL (ref 0–1)
CHOLEST SERPL-MCNC: 226 MG/DL (ref 0–199)
HDLC SERPL-MCNC: 86 MG/DL (ref 40–60)
LDLC SERPL CALC-MCNC: 125 MG/DL
PROT SERPL-MCNC: 7.1 G/DL (ref 6.4–8.2)
TRIGL SERPL-MCNC: 74 MG/DL (ref 0–150)
TSH SERPL DL<=0.005 MIU/L-ACNC: 0.92 UIU/ML (ref 0.27–4.2)
VLDLC SERPL CALC-MCNC: 15 MG/DL

## 2023-05-23 PROCEDURE — 99204 OFFICE O/P NEW MOD 45 MIN: CPT | Performed by: STUDENT IN AN ORGANIZED HEALTH CARE EDUCATION/TRAINING PROGRAM

## 2023-05-23 RX ORDER — ELETRIPTAN HYDROBROMIDE 40 MG/1
40 TABLET, FILM COATED ORAL
Qty: 6 TABLET | Refills: 3 | Status: SHIPPED | OUTPATIENT
Start: 2023-05-23 | End: 2023-05-23

## 2023-05-23 SDOH — ECONOMIC STABILITY: INCOME INSECURITY: HOW HARD IS IT FOR YOU TO PAY FOR THE VERY BASICS LIKE FOOD, HOUSING, MEDICAL CARE, AND HEATING?: SOMEWHAT HARD

## 2023-05-23 SDOH — ECONOMIC STABILITY: HOUSING INSECURITY
IN THE LAST 12 MONTHS, WAS THERE A TIME WHEN YOU DID NOT HAVE A STEADY PLACE TO SLEEP OR SLEPT IN A SHELTER (INCLUDING NOW)?: YES

## 2023-05-23 SDOH — ECONOMIC STABILITY: FOOD INSECURITY: WITHIN THE PAST 12 MONTHS, THE FOOD YOU BOUGHT JUST DIDN'T LAST AND YOU DIDN'T HAVE MONEY TO GET MORE.: SOMETIMES TRUE

## 2023-05-23 SDOH — ECONOMIC STABILITY: FOOD INSECURITY: WITHIN THE PAST 12 MONTHS, YOU WORRIED THAT YOUR FOOD WOULD RUN OUT BEFORE YOU GOT MONEY TO BUY MORE.: SOMETIMES TRUE

## 2023-05-23 ASSESSMENT — PATIENT HEALTH QUESTIONNAIRE - PHQ9
SUM OF ALL RESPONSES TO PHQ QUESTIONS 1-9: 8
3. TROUBLE FALLING OR STAYING ASLEEP: 2
SUM OF ALL RESPONSES TO PHQ9 QUESTIONS 1 & 2: 3
6. FEELING BAD ABOUT YOURSELF - OR THAT YOU ARE A FAILURE OR HAVE LET YOURSELF OR YOUR FAMILY DOWN: 0
2. FEELING DOWN, DEPRESSED OR HOPELESS: 2
10. IF YOU CHECKED OFF ANY PROBLEMS, HOW DIFFICULT HAVE THESE PROBLEMS MADE IT FOR YOU TO DO YOUR WORK, TAKE CARE OF THINGS AT HOME, OR GET ALONG WITH OTHER PEOPLE: 1
5. POOR APPETITE OR OVEREATING: 0
SUM OF ALL RESPONSES TO PHQ QUESTIONS 1-9: 8
1. LITTLE INTEREST OR PLEASURE IN DOING THINGS: 1
4. FEELING TIRED OR HAVING LITTLE ENERGY: 2
8. MOVING OR SPEAKING SO SLOWLY THAT OTHER PEOPLE COULD HAVE NOTICED. OR THE OPPOSITE, BEING SO FIGETY OR RESTLESS THAT YOU HAVE BEEN MOVING AROUND A LOT MORE THAN USUAL: 0
7. TROUBLE CONCENTRATING ON THINGS, SUCH AS READING THE NEWSPAPER OR WATCHING TELEVISION: 1
9. THOUGHTS THAT YOU WOULD BE BETTER OFF DEAD, OR OF HURTING YOURSELF: 0

## 2023-05-23 NOTE — PROGRESS NOTES
Blossom Ying  YOB: 1970    Date of Service:  5/23/2023    Chief Complaint:   Blossom Ying is a 48 y.o. female who presents to establish care      Allergies: Allergies   Allergen Reactions    Ibuprofen      headache    Vicodin [Hydrocodone-Acetaminophen]     Tramadol Nausea And Vomiting       Family Hx:  Family History   Problem Relation Age of Onset    Arthritis Mother     Asthma Mother     Depression Mother     Diabetes Mother     Hearing Loss Mother     Heart Disease Mother     High Blood Pressure Mother     High Cholesterol Mother     Mental Illness Mother     Stroke Mother     Vision Loss Mother     Arthritis Father     Hearing Loss Father     High Blood Pressure Father     High Cholesterol Father     Substance Abuse Father     Vision Loss Father     Arthritis Sister     Depression Sister     High Cholesterol Sister     [de-identified] / Stillbirths Sister     Diabetes Maternal Grandfather        Surgical HX:  Past Surgical History:   Procedure Laterality Date    BRAIN SURGERY      PT STATED DRILLED HOLE TO RELIEVE BLOOD FROM HEMORRHAGE    CHOLECYSTECTOMY  01/15/2018     LAPAROSCOPIC CHOLECYSTECTOMY              ENDOMETRIAL ABLATION      TONSILLECTOMY      TUBAL LIGATION          Social Hx:  Alcohol- socially  Tobacco- 1 ppd, down from 3 packs a day, sometimes 2 packs a day, interested in quitting ,but not right now  Recreational- none  No LMP recorded. Patient has had an ablation.   Sexual activity: has sex with a male, tubal ligation for [facility] and the ablation and menopause, hx of genital warts         Medications:  Current Outpatient Medications   Medication Sig Dispense Refill    eletriptan (RELPAX) 40 MG tablet Take 1 tablet by mouth once as needed (migraine) may repeat in 2 hours if necessary 6 tablet 3    loratadine (CLARITIN) 10 MG tablet Take 1 tablet by mouth daily      Multiple Vitamins-Minerals (VITAMIN D3 COMPLETE PO) Take by mouth daily      lidocaine 4 % external patch

## 2023-05-24 ENCOUNTER — SCHEDULED TELEPHONE ENCOUNTER (OUTPATIENT)
Dept: PRIMARY CARE CLINIC | Age: 53
End: 2023-05-24
Payer: COMMERCIAL

## 2023-05-24 ENCOUNTER — TELEPHONE (OUTPATIENT)
Dept: ADMINISTRATIVE | Age: 53
End: 2023-05-24

## 2023-05-24 DIAGNOSIS — J06.9 UPPER RESPIRATORY INFECTION WITH COUGH AND CONGESTION: Primary | ICD-10-CM

## 2023-05-24 DIAGNOSIS — J01.90 ACUTE NON-RECURRENT SINUSITIS, UNSPECIFIED LOCATION: ICD-10-CM

## 2023-05-24 LAB
EST. AVERAGE GLUCOSE BLD GHB EST-MCNC: 119.8 MG/DL
HBA1C MFR BLD: 5.8 %

## 2023-05-24 PROCEDURE — 99443 PR PHYS/QHP TELEPHONE EVALUATION 21-30 MIN: CPT | Performed by: NURSE PRACTITIONER

## 2023-05-24 RX ORDER — ACETAMINOPHEN, GUAIFENESIN, AND PHENYLEPHRINE HYDROCHLORIDE 650; 400; 10 MG/20ML; MG/20ML; MG/20ML
20 LIQUID ORAL 4 TIMES DAILY PRN
Qty: 474 ML | Refills: 0 | Status: SHIPPED | OUTPATIENT
Start: 2023-05-24 | End: 2023-05-31

## 2023-05-24 RX ORDER — SOD CHLOR,BICARB/SQUEEZ BOTTLE
1 PACKET, WITH RINSE DEVICE NASAL DAILY
Qty: 1 KIT | Refills: 0 | Status: SHIPPED | OUTPATIENT
Start: 2023-05-24

## 2023-05-24 RX ORDER — BENZONATATE 100 MG/1
100-200 CAPSULE ORAL 3 TIMES DAILY PRN
Qty: 30 CAPSULE | Refills: 0 | Status: SHIPPED | OUTPATIENT
Start: 2023-05-24 | End: 2023-05-31

## 2023-05-24 RX ORDER — FLUTICASONE PROPIONATE 50 MCG
2 SPRAY, SUSPENSION (ML) NASAL DAILY
Qty: 16 G | Refills: 0 | Status: SHIPPED | OUTPATIENT
Start: 2023-05-24

## 2023-05-24 NOTE — PROGRESS NOTES
Polly Zuniga is a 48 y.o. female evaluated via telephone on 5/24/2023 for Sinusitis, URI, Cough, and Pharyngitis    Attempted to connect by video unable to make connection due to poor connectivity and this was changed to telephone visit. Documentation:  I communicated with the patient and/or health care decision maker about URI, sore throat, sinus issues. This is an established patient. This is the first visit with me. She was at the office yesterday to establish care with Dr. Linda Mcduffie as PCP. She was feeling fine yesterday. This morning when she woke up, she felt horrible \"like she had been run over\". Her symptoms: + scratchy throat, +PND, + cough, extreme fatigue, general sense of not feeling well, nasal discharge and phlegm is pale yellow and headache. She denies fever at this time. No SOB except when coughing, no wheezing. No N/V/D. She gets headaches everyday and usually takes Anacin and that will help but did not today she states sinuses feel so full and there is pressure and pain. She did take Zicam this morning. She did test for COVID this morning and it was negative. I did recommend that if she is no improving or feeling worse in a day or 2 to retest COVID and she agreed with plan. She takes here Oxycodone as needed and if COVID positive would be a candidate for Paxlovid as long as NOT taking Eletriptan or Oxycodone during that time ( the 8 days of PAXLOVID (5 days or med and 3 to clear)), if that Eletriptan and Oxycodone needs to be taken, she would need to use Molnupiravir instead due to coadministration risk . Details of this discussion including any medical advice provided:     1. Upper respiratory infection with cough and congestion-New  Notify office if you have no improvement or worsening of condition. COVID negative today if not improving but feel worse please re-test COVID and if positive let us know immediately.  If with in 5 days of symptoms which began 5/24/23 (day 1),  would be a

## 2023-05-24 NOTE — PATIENT INSTRUCTIONS
Notify office if you have no improvement or worsening of condition. COVID negative today if not improving but feel worse please re-test COVID and if positive let us know immediately. If with in 5 days of symptoms which began 5/24/23 (day 1),  would be a candidate for Paxlovid as long as NOT taking Eletriptan or Oxycodone during that time ( the 8 days of PAXLOVID (5 days or med and 3 to clear)), if that Eletriptan and Oxycodone needs to be taken, she would need to use Molnupiravir instead due to coadministration risk . If COVID Positive:    Currently, the CDC recommends staying at home in self isolation for at least 5 days. After that, if you are without a fever and symptoms are improving, you may go out, but only if wearing a mask for an additional 5 days. Thankfully, most people recover uneventfully. The mainstay of treatment for most people remains focused on symptom control, isolating and watching for signs of worsening illness. You should drink plenty of fluids, eat when you are able, and rest as much as possible. Recommend treating symptoms with OTC medications: Flonase for congestions, Mucinex for Phlegm, Robitussin DM or Delsym for cough, Tylenol for fever/body aches. May use Cepacol lozenges, or chloraseptic spray. Gargle with warm salt water 3-4 times a day to help with sore throat. Warm tea with honey. May take 1 teaspoon of honey 3 times a day for sore throat. You can use ice, warm chicken noodle soup, soft foods, popsicles and cough drops to help soothe your throat. Recommend Vitamin D, C and Zinc.    We do not prescribe antibiotics for viral illnesses; however, we can treat secondary infections should the need arise.     Please seek more urgent medical attention if you develop any of the following:  Chest pain  Shortness of breath  Bluish lips or face  Severe headache   Severe dizziness or passing out  New confusion  Inability to stay awake  Extreme weakness    If you have a pulse

## 2023-05-30 ENCOUNTER — OFFICE VISIT (OUTPATIENT)
Age: 53
End: 2023-05-30
Payer: COMMERCIAL

## 2023-05-30 VITALS
OXYGEN SATURATION: 98 % | BODY MASS INDEX: 20.49 KG/M2 | HEIGHT: 65 IN | DIASTOLIC BLOOD PRESSURE: 82 MMHG | HEART RATE: 86 BPM | SYSTOLIC BLOOD PRESSURE: 130 MMHG | WEIGHT: 123 LBS

## 2023-05-30 DIAGNOSIS — I60.9 SUBARACHNOID HEMORRHAGE (HCC): ICD-10-CM

## 2023-05-30 DIAGNOSIS — G43.119 INTRACTABLE MIGRAINE WITH AURA WITHOUT STATUS MIGRAINOSUS: Primary | ICD-10-CM

## 2023-05-30 DIAGNOSIS — J01.90 ACUTE NON-RECURRENT SINUSITIS, UNSPECIFIED LOCATION: ICD-10-CM

## 2023-05-30 PROCEDURE — 99204 OFFICE O/P NEW MOD 45 MIN: CPT | Performed by: PSYCHIATRY & NEUROLOGY

## 2023-05-30 RX ORDER — VERAPAMIL HYDROCHLORIDE 40 MG/1
40 TABLET ORAL 2 TIMES DAILY
Qty: 60 TABLET | Refills: 1 | Status: SHIPPED | OUTPATIENT
Start: 2023-05-30

## 2023-05-30 RX ORDER — OXYCODONE AND ACETAMINOPHEN 10; 325 MG/1; MG/1
TABLET ORAL
COMMUNITY
Start: 2023-05-26

## 2023-05-30 RX ORDER — FLUTICASONE PROPIONATE 50 MCG
2 SPRAY, SUSPENSION (ML) NASAL DAILY
Qty: 16 G | Refills: 0 | Status: SHIPPED | OUTPATIENT
Start: 2023-05-30

## 2023-05-30 NOTE — TELEPHONE ENCOUNTER
Refill Request     CONFIRM preferred pharmacy with the patient. If Mail Order Rx - Pend for 90 day refill. Last Seen: Last Seen Department: 2023  Last Seen by PCP: 2023    Last Written: fluticasone 2023 16 g 0 refills    If no future appointment scheduled:  Review the last OV with PCP and review information for follow-up visit,  Route STAFF MESSAGE with patient name to the [facility] for scheduling with the following information:            -  Timing of next visit           -  Visit type ie Physical, OV, etc           -  Diagnoses/Reason ie. COPD, HTN - Do not use MEDICATION, Follow-up or CHECK UP - Give reason for visit      Next Appointment:   Future Appointments   Date Time Provider Cristhian Lou   2023 10:00 AM Simba Roque MD [facility]. Neurology -   2023 11:00 AM Brianna Lujan MD [facility] - DYD       Message sent to [address] [place] [address] to schedule appt with patient?   NO      Requested Prescriptions     Pending Prescriptions Disp Refills    fluticasone (FLONASE) 50 MCG/ACT nasal spray 16 g 0     Si sprays by Each Nostril route daily X 7 days and then go to 1 spray each nostril daily

## 2023-05-30 NOTE — PROGRESS NOTES
oxyCODONE-acetaminophen (PERCOCET)  MG per tablet       fluticasone (FLONASE) 50 MCG/ACT nasal spray 2 sprays by Each Nostril route daily X 7 days and then go to 1 spray each nostril daily 16 g 0    verapamil (CALAN) 40 MG tablet Take 1 tablet by mouth in the morning and at bedtime 60 tablet 1    phenylephrine-APAP-guaiFENesin (MUCINEX FAST-MAX COLD & SINUS) -400 MG/20ML LIQD Take 20 mLs by mouth 4 times daily as needed (cough/congestion) 474 mL 0    benzonatate (TESSALON) 100 MG capsule Take 1-2 capsules by mouth 3 times daily as needed for Cough 30 capsule 0    loratadine (CLARITIN) 10 MG tablet Take 1 tablet by mouth daily      Multiple Vitamins-Minerals (VITAMIN D3 COMPLETE PO) Take by mouth daily      lidocaine 4 % external patch Place 1 patch onto the skin daily 30 patch 3    Omega-3 Fatty Acids (FISH OIL PO) Take by mouth      Hypertonic Nasal Wash (SINUS RINSE) PACK 1 kit by Nasal route daily (Patient not taking: Reported on 5/30/2023) 1 kit 0    eletriptan (RELPAX) 40 MG tablet Take 1 tablet by mouth once as needed (migraine) may repeat in 2 hours if necessary 6 tablet 3     No current facility-administered medications for this visit. Allergies: Allergies as of 05/30/2023 - Fully Reviewed 05/30/2023   Allergen Reaction Noted    Ibuprofen  02/24/2011    Vicodin [hydrocodone-acetaminophen]  02/24/2011    Tramadol Nausea And Vomiting 04/11/2018        Social history:     reports that she has been smoking cigarettes. She started smoking about 39 years ago. She has a 40.00 pack-year smoking history. She has never used smokeless tobacco. She reports current alcohol use. She reports that she does not use drugs.      Family history:    Family History   Problem Relation Age of Onset    Arthritis Mother     Asthma Mother     Depression Mother     Diabetes Mother     Hearing Loss Mother     Heart Disease Mother     High Blood Pressure Mother     High Cholesterol Mother     Mental Illness Mother

## 2023-06-27 ENCOUNTER — OFFICE VISIT (OUTPATIENT)
Age: 53
End: 2023-06-27
Payer: COMMERCIAL

## 2023-06-27 VITALS
BODY MASS INDEX: 20.49 KG/M2 | DIASTOLIC BLOOD PRESSURE: 68 MMHG | WEIGHT: 123 LBS | SYSTOLIC BLOOD PRESSURE: 120 MMHG | HEART RATE: 65 BPM | OXYGEN SATURATION: 96 % | HEIGHT: 65 IN

## 2023-06-27 DIAGNOSIS — G89.29 CHRONIC INTRACTABLE HEADACHE, UNSPECIFIED HEADACHE TYPE: Primary | ICD-10-CM

## 2023-06-27 DIAGNOSIS — T88.7XXA SIDE EFFECT OF MEDICATION: ICD-10-CM

## 2023-06-27 DIAGNOSIS — R51.9 CHRONIC INTRACTABLE HEADACHE, UNSPECIFIED HEADACHE TYPE: Primary | ICD-10-CM

## 2023-06-27 PROCEDURE — 99214 OFFICE O/P EST MOD 30 MIN: CPT | Performed by: PSYCHIATRY & NEUROLOGY

## 2023-06-27 RX ORDER — MAGNESIUM 200 MG
200 TABLET ORAL DAILY
COMMUNITY

## 2023-06-27 RX ORDER — RIZATRIPTAN BENZOATE 10 MG/1
TABLET, ORALLY DISINTEGRATING ORAL
COMMUNITY
Start: 2023-06-07

## 2023-06-27 RX ORDER — ASPIRIN 325 MG
325 TABLET ORAL DAILY
COMMUNITY

## 2023-06-29 DIAGNOSIS — G43.119 INTRACTABLE MIGRAINE WITH AURA WITHOUT STATUS MIGRAINOSUS: Primary | ICD-10-CM

## 2023-07-17 ENCOUNTER — TELEPHONE (OUTPATIENT)
Dept: PRIMARY CARE CLINIC | Age: 53
End: 2023-07-17

## 2023-07-17 NOTE — TELEPHONE ENCOUNTER
----- Message from Gwendolyn Palomo, [address] sent at 7/17/2023  3:26 PM EDT -----  Subject: Appointment Request    Reason for Call: Established Patient Appointment needed: New Patient   Request Appointment    QUESTIONS    Reason for appointment request? Available appointments did not meet   patient need     Additional Information for Provider? Patient would like to be seen for a   follow up prior to 10/2023.  She is available on Mondays, Tuesdays, and   Thursdays.   ---------------------------------------------------------------------------  --------------  Rocio BELLAMY  [phone]; OK to leave message on voicemail  ---------------------------------------------------------------------------  --------------  SCRIPT ANSWERS

## 2023-08-01 DIAGNOSIS — J01.90 ACUTE NON-RECURRENT SINUSITIS, UNSPECIFIED LOCATION: ICD-10-CM

## 2023-08-02 NOTE — TELEPHONE ENCOUNTER
Refill Request     CONFIRM preferred pharmacy with the patient. If Mail Order Rx - Pend for 90 day refill. Last Seen: Last Seen Department: 5/23/2023  Last Seen by PCP: 5/23/2023    Last Written: 5/30/2023, #16 g, 0 refills    If no future appointment scheduled:  Review the last OV with PCP and review information for follow-up visit,  Route STAFF MESSAGE with patient name to the [facility] for scheduling with the following information:            -  Timing of next visit           -  Visit type ie Physical, OV, etc           -  Diagnoses/Reason ie. COPD, HTN - Do not use MEDICATION, Follow-up or CHECK UP - Give reason for visit      Next Appointment:   Future Appointments   Date Time Provider [address] [place] [address]   8/22/2023 11:00 AM Bob Mcgee MD [address] [place] [address] Neurology -   8/29/2023  2:00 PM Maryam Butler MD [address] [facility]       Message sent to [address] [place] [address] to schedule appt with patient?   N/A      Requested Prescriptions     Pending Prescriptions Disp Refills    fluticasone (FLONASE) 50 MCG/ACT nasal spray [Pharmacy Med Name: FLUTICASONE PROP 50 MCG SPRAY]       Sig: USE 2 SPRAYS IN EACH NOSTRIL DAILY X 7 DAYS AND THEN GO TO 1 SPRAY EACH NOSTRIL DAILY

## 2023-08-03 RX ORDER — FLUTICASONE PROPIONATE 50 MCG
SPRAY, SUSPENSION (ML) NASAL
Qty: 16 G | Refills: 0 | Status: SHIPPED | OUTPATIENT
Start: 2023-08-03 | End: 2023-08-16

## 2023-08-14 DIAGNOSIS — J01.90 ACUTE NON-RECURRENT SINUSITIS, UNSPECIFIED LOCATION: ICD-10-CM

## 2023-08-16 DIAGNOSIS — G43.119 INTRACTABLE MIGRAINE WITH AURA WITHOUT STATUS MIGRAINOSUS: Primary | ICD-10-CM

## 2023-08-16 RX ORDER — FLUTICASONE PROPIONATE 50 MCG
SPRAY, SUSPENSION (ML) NASAL
Qty: 1 EACH | Refills: 1 | Status: SHIPPED | OUTPATIENT
Start: 2023-08-16

## 2023-08-16 NOTE — TELEPHONE ENCOUNTER
Rx pended and routed to MD for sig (30 day supply only, no refills added to Rx since she has appt next week).

## 2023-08-16 NOTE — TELEPHONE ENCOUNTER
Pt called asking about a refill on verapamil. Pt would like to have non extended release as she feels the extended release does not work as well . [facility] [place]. Pt has appt on 8/22/23, but will be out of medication tomorrow.      Please advise

## 2023-08-21 RX ORDER — VERAPAMIL HYDROCHLORIDE 120 MG/1
120 TABLET, FILM COATED ORAL NIGHTLY
Qty: 30 TABLET | Refills: 0 | Status: SHIPPED | OUTPATIENT
Start: 2023-08-21 | End: 2023-08-22 | Stop reason: SDUPTHER

## 2023-08-22 ENCOUNTER — OFFICE VISIT (OUTPATIENT)
Dept: FAMILY MEDICINE CLINIC | Age: 53
End: 2023-08-22
Payer: COMMERCIAL

## 2023-08-22 ENCOUNTER — OFFICE VISIT (OUTPATIENT)
Age: 53
End: 2023-08-22
Payer: COMMERCIAL

## 2023-08-22 VITALS
DIASTOLIC BLOOD PRESSURE: 74 MMHG | HEIGHT: 65 IN | HEART RATE: 66 BPM | SYSTOLIC BLOOD PRESSURE: 120 MMHG | OXYGEN SATURATION: 98 % | WEIGHT: 119 LBS | BODY MASS INDEX: 19.83 KG/M2

## 2023-08-22 VITALS
DIASTOLIC BLOOD PRESSURE: 80 MMHG | SYSTOLIC BLOOD PRESSURE: 124 MMHG | BODY MASS INDEX: 19.96 KG/M2 | WEIGHT: 119.8 LBS | OXYGEN SATURATION: 97 % | HEIGHT: 65 IN | HEART RATE: 76 BPM

## 2023-08-22 DIAGNOSIS — M79.2 NEURALGIA: ICD-10-CM

## 2023-08-22 DIAGNOSIS — M54.12 CERVICAL RADICULOPATHY, ACUTE: Primary | ICD-10-CM

## 2023-08-22 DIAGNOSIS — B35.1 ONYCHOMYCOSIS OF TOENAIL: ICD-10-CM

## 2023-08-22 DIAGNOSIS — Z72.0 TOBACCO USE: ICD-10-CM

## 2023-08-22 DIAGNOSIS — Z76.89 ENCOUNTER TO ESTABLISH CARE: ICD-10-CM

## 2023-08-22 DIAGNOSIS — M54.41 CHRONIC MIDLINE LOW BACK PAIN WITH RIGHT-SIDED SCIATICA: ICD-10-CM

## 2023-08-22 DIAGNOSIS — M15.9 PRIMARY OSTEOARTHRITIS INVOLVING MULTIPLE JOINTS: ICD-10-CM

## 2023-08-22 DIAGNOSIS — G43.119 INTRACTABLE MIGRAINE WITH AURA WITHOUT STATUS MIGRAINOSUS: Primary | ICD-10-CM

## 2023-08-22 DIAGNOSIS — T88.7XXA SIDE EFFECT OF MEDICATION: ICD-10-CM

## 2023-08-22 DIAGNOSIS — G89.29 CHRONIC MIDLINE LOW BACK PAIN WITH RIGHT-SIDED SCIATICA: ICD-10-CM

## 2023-08-22 DIAGNOSIS — R73.03 PREDIABETES: ICD-10-CM

## 2023-08-22 DIAGNOSIS — E78.2 MIXED HYPERLIPIDEMIA: ICD-10-CM

## 2023-08-22 PROCEDURE — 99214 OFFICE O/P EST MOD 30 MIN: CPT | Performed by: STUDENT IN AN ORGANIZED HEALTH CARE EDUCATION/TRAINING PROGRAM

## 2023-08-22 PROCEDURE — 99214 OFFICE O/P EST MOD 30 MIN: CPT | Performed by: PSYCHIATRY & NEUROLOGY

## 2023-08-22 RX ORDER — DULOXETIN HYDROCHLORIDE 30 MG/1
30 CAPSULE, DELAYED RELEASE ORAL DAILY
Qty: 30 CAPSULE | Refills: 1 | Status: SHIPPED | OUTPATIENT
Start: 2023-08-22

## 2023-08-22 RX ORDER — TERBINAFINE HYDROCHLORIDE 250 MG/1
250 TABLET ORAL DAILY
Qty: 84 TABLET | Refills: 0 | Status: SHIPPED | OUTPATIENT
Start: 2023-08-22 | End: 2023-11-14

## 2023-08-22 RX ORDER — CARBAMAZEPINE 100 MG/1
100 TABLET, EXTENDED RELEASE ORAL NIGHTLY
Qty: 30 TABLET | Refills: 2 | Status: SHIPPED | OUTPATIENT
Start: 2023-09-05

## 2023-08-22 RX ORDER — VERAPAMIL HYDROCHLORIDE 120 MG/1
120 TABLET, FILM COATED ORAL NIGHTLY
Qty: 30 TABLET | Refills: 2 | Status: SHIPPED | OUTPATIENT
Start: 2023-08-22

## 2023-08-22 ASSESSMENT — ENCOUNTER SYMPTOMS
SORE THROAT: 1
RHINORRHEA: 1
BLOOD IN STOOL: 0
COUGH: 1
SHORTNESS OF BREATH: 0
BACK PAIN: 1

## 2023-08-22 ASSESSMENT — PATIENT HEALTH QUESTIONNAIRE - PHQ9
2. FEELING DOWN, DEPRESSED OR HOPELESS: 0
SUM OF ALL RESPONSES TO PHQ QUESTIONS 1-9: 0
SUM OF ALL RESPONSES TO PHQ9 QUESTIONS 1 & 2: 0
SUM OF ALL RESPONSES TO PHQ QUESTIONS 1-9: 0
1. LITTLE INTEREST OR PLEASURE IN DOING THINGS: 0

## 2023-08-22 NOTE — PROGRESS NOTES
vaccine  Aged CHASTITY, PMDOUG, SH and  reviewed and noted. Recent and past labs, tests and consults also reviewed. Recent or new meds also reviewed. Jasson Winslow,     This dictation was generated by voice recognition computer software. Although all attempts are made to edit the dictation for accuracy, there may be errors in the transcription that are not intended.

## 2023-08-22 NOTE — PROGRESS NOTES
Neurology outpatient F/U visit    Patient name: Venkat Dutta      Chief Complaint:  Intractable headache. History of present illness: This is a 48years old right-handed female. The patient is here for evaluation of intractable headache. The patient describes headache as bilateral throbbing pain with moderate to severe in intensity. The patient had been on Botox injection with significant benefit in the past.  Unfortunately, the patient lost benefit from Botox injection after she developed COVID-19 infection 2 years ago. So she decided to stop Botox injection about 9 months ago. The patient reports significant daily headache with phonophobia, photophobia and nauseous. The patient also reported occasional visual aura with her headaches, abnormal color on her peripheral visual field. The patient also failed multiple medications for migraine prophylaxis below. The patient is also noted for history of subarachnoid hemorrhage. The latest MRI brain was done 3 years ago which was normal per the patient. 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 Failed medications: Nurtec, amitriptyline, propanolol, Depakote, and topiramate. Interval history:  06/27/23: The patient reports significant improvement on her headache after taking verapamil. Per the patient, the headache has been at least 60% reduction. However, the patient also complains some hives and drowsiness when she takes verapamil in the morning. Otherwise, the patient is very pleased with her headache. She does not need the Botox injection at this time. 08/22/23: Unfortunately, the patient did not get any benefit from verapamil extended release. So the patient went back to work probably twice a day. The patient remains to have significant side effect from verapamil in the morning especially drowsiness.   At this time, the patient also reports she has had chronic intermittent

## 2023-08-23 ENCOUNTER — APPOINTMENT (OUTPATIENT)
Dept: GENERAL RADIOLOGY | Age: 53
End: 2023-08-23
Payer: COMMERCIAL

## 2023-08-23 ENCOUNTER — APPOINTMENT (OUTPATIENT)
Dept: CT IMAGING | Age: 53
End: 2023-08-23
Payer: COMMERCIAL

## 2023-08-23 ENCOUNTER — HOSPITAL ENCOUNTER (EMERGENCY)
Age: 53
Discharge: HOME | End: 2023-08-23
Payer: COMMERCIAL

## 2023-08-23 ENCOUNTER — TELEPHONE (OUTPATIENT)
Dept: FAMILY MEDICINE CLINIC | Age: 53
End: 2023-08-23

## 2023-08-23 VITALS
HEIGHT: 65 IN | HEART RATE: 61 BPM | DIASTOLIC BLOOD PRESSURE: 79 MMHG | RESPIRATION RATE: 18 BRPM | TEMPERATURE: 97.3 F | OXYGEN SATURATION: 97 % | WEIGHT: 117 LBS | SYSTOLIC BLOOD PRESSURE: 144 MMHG | BODY MASS INDEX: 19.49 KG/M2

## 2023-08-23 DIAGNOSIS — R07.89 ATYPICAL CHEST PAIN: Primary | ICD-10-CM

## 2023-08-23 LAB
ALBUMIN SERPL-MCNC: 4.6 G/DL (ref 3.4–5)
ALBUMIN/GLOB SERPL: 1.6 {RATIO} (ref 1.1–2.2)
ALP SERPL-CCNC: 72 U/L (ref 40–129)
ALT SERPL-CCNC: 27 U/L (ref 10–40)
ANION GAP SERPL CALCULATED.3IONS-SCNC: 11 MMOL/L (ref 3–16)
AST SERPL-CCNC: 29 U/L (ref 15–37)
BASOPHILS # BLD: 0.1 K/UL (ref 0–0.2)
BASOPHILS NFR BLD: 1.3 %
BILIRUB SERPL-MCNC: 0.3 MG/DL (ref 0–1)
BUN SERPL-MCNC: 9 MG/DL (ref 7–20)
CALCIUM SERPL-MCNC: 9.8 MG/DL (ref 8.3–10.6)
CHLORIDE SERPL-SCNC: 104 MMOL/L (ref 99–110)
CO2 SERPL-SCNC: 21 MMOL/L (ref 21–32)
CREAT SERPL-MCNC: 0.7 MG/DL (ref 0.6–1.1)
DEPRECATED RDW RBC AUTO: 14.4 % (ref 12.4–15.4)
EKG ATRIAL RATE: 63 BPM
EKG DIAGNOSIS: NORMAL
EKG P AXIS: 80 DEGREES
EKG P-R INTERVAL: 156 MS
EKG Q-T INTERVAL: 414 MS
EKG QRS DURATION: 84 MS
EKG QTC CALCULATION (BAZETT): 423 MS
EKG R AXIS: 26 DEGREES
EKG T AXIS: 28 DEGREES
EKG VENTRICULAR RATE: 63 BPM
EOSINOPHIL # BLD: 0.3 K/UL (ref 0–0.6)
EOSINOPHIL NFR BLD: 4.8 %
GFR SERPLBLD CREATININE-BSD FMLA CKD-EPI: >60 ML/MIN/{1.73_M2}
GLUCOSE SERPL-MCNC: 91 MG/DL (ref 70–99)
HCG SERPL QL: NEGATIVE
HCT VFR BLD AUTO: 44.9 % (ref 36–48)
HGB BLD-MCNC: 15.2 G/DL (ref 12–16)
LYMPHOCYTES # BLD: 2.6 K/UL (ref 1–5.1)
LYMPHOCYTES NFR BLD: 37.7 %
MCH RBC QN AUTO: 32.7 PG (ref 26–34)
MCHC RBC AUTO-ENTMCNC: 33.9 G/DL (ref 31–36)
MCV RBC AUTO: 96.5 FL (ref 80–100)
MONOCYTES # BLD: 0.4 K/UL (ref 0–1.3)
MONOCYTES NFR BLD: 6.4 %
NEUTROPHILS # BLD: 3.4 K/UL (ref 1.7–7.7)
NEUTROPHILS NFR BLD: 49.8 %
PLATELET # BLD AUTO: 264 K/UL (ref 135–450)
PMV BLD AUTO: 7.4 FL (ref 5–10.5)
POTASSIUM SERPL-SCNC: 4.8 MMOL/L (ref 3.5–5.1)
PROT SERPL-MCNC: 7.4 G/DL (ref 6.4–8.2)
RBC # BLD AUTO: 4.66 M/UL (ref 4–5.2)
SODIUM SERPL-SCNC: 136 MMOL/L (ref 136–145)
TROPONIN, HIGH SENSITIVITY: <6 NG/L (ref 0–14)
TROPONIN, HIGH SENSITIVITY: <6 NG/L (ref 0–14)
WBC # BLD AUTO: 6.8 K/UL (ref 4–11)

## 2023-08-23 PROCEDURE — 80053 COMPREHEN METABOLIC PANEL: CPT

## 2023-08-23 PROCEDURE — 71045 X-RAY EXAM CHEST 1 VIEW: CPT

## 2023-08-23 PROCEDURE — 93005 ELECTROCARDIOGRAM TRACING: CPT | Performed by: EMERGENCY MEDICINE

## 2023-08-23 PROCEDURE — 99285 EMERGENCY DEPT VISIT HI MDM: CPT

## 2023-08-23 PROCEDURE — 71275 CT ANGIOGRAPHY CHEST: CPT

## 2023-08-23 PROCEDURE — 84484 ASSAY OF TROPONIN QUANT: CPT

## 2023-08-23 PROCEDURE — 93010 ELECTROCARDIOGRAM REPORT: CPT | Performed by: INTERNAL MEDICINE

## 2023-08-23 PROCEDURE — 85025 COMPLETE CBC W/AUTO DIFF WBC: CPT

## 2023-08-23 PROCEDURE — 6370000000 HC RX 637 (ALT 250 FOR IP): Performed by: PHYSICIAN ASSISTANT

## 2023-08-23 PROCEDURE — 6360000004 HC RX CONTRAST MEDICATION: Performed by: PHYSICIAN ASSISTANT

## 2023-08-23 PROCEDURE — 84703 CHORIONIC GONADOTROPIN ASSAY: CPT

## 2023-08-23 RX ORDER — ASPIRIN 325 MG
325 TABLET ORAL ONCE
Status: COMPLETED | OUTPATIENT
Start: 2023-08-23 | End: 2023-08-23

## 2023-08-23 RX ADMIN — IOPAMIDOL 75 ML: 755 INJECTION, SOLUTION INTRAVENOUS at 12:34

## 2023-08-23 RX ADMIN — ASPIRIN 325 MG: 325 TABLET ORAL at 12:06

## 2023-08-23 ASSESSMENT — PAIN DESCRIPTION - FREQUENCY
FREQUENCY: CONTINUOUS
FREQUENCY: CONTINUOUS

## 2023-08-23 ASSESSMENT — PAIN SCALES - GENERAL
PAINLEVEL_OUTOF10: 3
PAINLEVEL_OUTOF10: 4
PAINLEVEL_OUTOF10: 2
PAINLEVEL_OUTOF10: 3

## 2023-08-23 ASSESSMENT — PAIN DESCRIPTION - LOCATION
LOCATION: CHEST

## 2023-08-23 ASSESSMENT — HEART SCORE: ECG: 0

## 2023-08-23 ASSESSMENT — PAIN - FUNCTIONAL ASSESSMENT
PAIN_FUNCTIONAL_ASSESSMENT: 0-10
PAIN_FUNCTIONAL_ASSESSMENT: 0-10

## 2023-08-23 ASSESSMENT — PAIN DESCRIPTION - ORIENTATION: ORIENTATION: MID

## 2023-08-23 ASSESSMENT — PAIN DESCRIPTION - PAIN TYPE
TYPE: ACUTE PAIN
TYPE: ACUTE PAIN

## 2023-08-23 NOTE — ED PROVIDER NOTES
[facility] Emergency Department    CHIEF COMPLAINT  Chest Pain (Pt reports midsternal chest pressure that started yesterday described as intermittent. Pt reports some lightheadedness yesterday. Pt reports she was dx with \"a cold\" yesterday at her PCP office prior to CP starting)      SHARED SERVICE VISIT  Evaluated by KATHY. My supervising physician was available for consultation. HISTORY OF PRESENT ILLNESS  Francisco J Haynes is a 48 y.o. female who presents to the ED complaining of midsternal chest pressure that began yesterday. She is experiencing intermittent chest pressure. With some increased intensity and sharp burning sensations at times. She says pain will initially start last 30 to 45 seconds and then dissipate. She can go anywhere from 5 to 30 minutes without experiencing any pain before comes back. She does experience pain radiating into her back. She was evaluated yesterday by her PCP due to having a cold and is currently on cold medication for this. She said when the pain comes on she does experience some shortness of breath that dissipates with the chest pain. She denies becoming diaphoretic or nauseous when pains onset. Chest pain is not associated with exertion. She denies any cardiac history. She denies any headache, body ache, fever or chills. She denies any nausea, vomiting, or abdominal pain. She denies any diarrhea or bloody stools. She denies any recent travel or sick contacts. No other complaints, modifying factors or associated symptoms. Nursing notes reviewed.    Past Medical History:   Diagnosis Date    Alcohol abuse 2014    DDD (degenerative disc disease), lumbosacral     Depression     Gastritis     Heart murmur     Heart palpitations     Migraine     Osteoarthritis     Overactive bladder     SCIATIC NERVE DISEASE     Subarachnoid hemorrhage ([address]) 2007     Past Surgical History:   Procedure Laterality Date    BRAIN SURGERY      PT

## 2023-08-23 NOTE — TELEPHONE ENCOUNTER
Patient called and stated that she was having some chest discomfort that started about 2 hours after her New patient appt. On 8/22/23. Patient stated that it comes and goes and is pressure. Patient just wanted to let us know that she is going to go ahead and go to [facility].      YISSEL

## 2023-08-23 NOTE — ED PROVIDER NOTES
Patient seen and evaluated by KATHY. EKG: Normal sinus rhythm with a rate of 63. Normal axis. Normal intervals and durations. Septal Q waves present. No previous EKG available for review.      Vika Hernández DO  08/23/23 0801

## 2023-09-11 ENCOUNTER — OFFICE VISIT (OUTPATIENT)
Dept: FAMILY MEDICINE CLINIC | Age: 53
End: 2023-09-11
Payer: COMMERCIAL

## 2023-09-11 VITALS
HEART RATE: 67 BPM | SYSTOLIC BLOOD PRESSURE: 124 MMHG | WEIGHT: 121.6 LBS | OXYGEN SATURATION: 97 % | BODY MASS INDEX: 20.26 KG/M2 | HEIGHT: 65 IN | DIASTOLIC BLOOD PRESSURE: 84 MMHG

## 2023-09-11 DIAGNOSIS — G43.809 OTHER MIGRAINE WITHOUT STATUS MIGRAINOSUS, NOT INTRACTABLE: ICD-10-CM

## 2023-09-11 DIAGNOSIS — J40 BRONCHITIS: ICD-10-CM

## 2023-09-11 DIAGNOSIS — R05.9 COUGH, UNSPECIFIED TYPE: Primary | ICD-10-CM

## 2023-09-11 DIAGNOSIS — B96.89 ACUTE BACTERIAL SINUSITIS: ICD-10-CM

## 2023-09-11 DIAGNOSIS — J01.90 ACUTE BACTERIAL SINUSITIS: ICD-10-CM

## 2023-09-11 LAB
Lab: NORMAL
PERFORMING INSTRUMENT: NORMAL
QC PASS/FAIL: NORMAL
SARS-COV-2, POC: NORMAL

## 2023-09-11 PROCEDURE — 87426 SARSCOV CORONAVIRUS AG IA: CPT | Performed by: STUDENT IN AN ORGANIZED HEALTH CARE EDUCATION/TRAINING PROGRAM

## 2023-09-11 PROCEDURE — 99214 OFFICE O/P EST MOD 30 MIN: CPT | Performed by: STUDENT IN AN ORGANIZED HEALTH CARE EDUCATION/TRAINING PROGRAM

## 2023-09-11 RX ORDER — AMOXICILLIN AND CLAVULANATE POTASSIUM 875; 125 MG/1; MG/1
1 TABLET, FILM COATED ORAL 2 TIMES DAILY
Qty: 20 TABLET | Refills: 0 | Status: SHIPPED | OUTPATIENT
Start: 2023-09-11 | End: 2023-09-21

## 2023-09-11 RX ORDER — PREDNISONE 50 MG/1
50 TABLET ORAL DAILY
Qty: 5 TABLET | Refills: 0 | Status: SHIPPED | OUTPATIENT
Start: 2023-09-11 | End: 2023-09-16

## 2023-09-11 RX ORDER — RIZATRIPTAN BENZOATE 10 MG/1
TABLET, ORALLY DISINTEGRATING ORAL
Qty: 18 TABLET | Refills: 0 | Status: SHIPPED | OUTPATIENT
Start: 2023-09-11

## 2023-09-11 ASSESSMENT — ENCOUNTER SYMPTOMS
ABDOMINAL PAIN: 0
SHORTNESS OF BREATH: 1
DIARRHEA: 1
RHINORRHEA: 1
SINUS PAIN: 1
COUGH: 1
SORE THROAT: 1
SINUS PRESSURE: 1

## 2023-09-11 NOTE — PROGRESS NOTES
[facility] Family Medicine  2023    Hannah Calzada (:  1970) is a 48 y.o. female, here for evaluation of the following medical concerns:    Chief Complaint   Patient presents with    Headache     X2 weeks    Congestion    Diarrhea        ASSESSMENT/ PLAN  1. Cough, unspecified type  -COVID test negative  - POCT COVID-19, Antigen    2. Acute bacterial sinusitis  -Augmentin  - POCT COVID-19, Antigen    3. Bronchitis  -Prednisone  - POCT COVID-19, Antigen    4. Other migraine without status migrainosus, not intractable  -Nurtec sample, may be too early to do rizatriptan refill.  - rizatriptan (MAXALT-MLT) 10 MG disintegrating tablet; DISSOLVE 1 TABLET IN MOUTH AT MIGRAINE ONSET. MAY REPEAT IN 2 HOURS. DO NOT EXCEED 30MG/24HR  Dispense: 18 tablet; Refill: 0       No follow-ups on file. HPI  Patient is here due to 2 weeks of runny nose, cough, mild shortness of breath with activity that resolves at rest, and increasing pressure in the front of her head in the frontal sinus region. Pressure has started to cause headaches and has been worsening over the last week. She did start to feel better after her last cough that she had went camping at her last appointment. She watched her grandkids who were sick and then she became ill. She has been taking over-the-counter Mucinex daytime and nighttime for her sinuses. The daytime with guaifenesin, Mucinex, Sudafed, and dextromethorphan seems to work better than the nighttime. Her headache is not the worst headache of her life. She has had 3 episodes of diarrhea today. She is eating and drinking. Her cough is productive of green sputum. ROS  Review of Systems   Constitutional:  Positive for fatigue. Negative for chills and fever. HENT:  Positive for rhinorrhea, sinus pressure, sinus pain and sore throat. Respiratory:  Positive for cough and shortness of breath (mild). Cardiovascular:  Negative for chest pain.    Gastrointestinal:

## 2023-09-14 DIAGNOSIS — G43.119 INTRACTABLE MIGRAINE WITH AURA WITHOUT STATUS MIGRAINOSUS: ICD-10-CM

## 2023-09-14 RX ORDER — VERAPAMIL HYDROCHLORIDE 120 MG/1
120 TABLET, FILM COATED ORAL
Qty: 30 TABLET | Refills: 2 | OUTPATIENT
Start: 2023-09-14

## 2023-09-14 NOTE — TELEPHONE ENCOUNTER
Last seen: 8/22/23    Next OV: 10/24/23    Last filled: 8/22/23 (30 day supply with 2 refills) at [facility]. I clarified with pt [facility] [address]. I told her to  verapamil Rx at [facility] because they have refills on file.

## 2023-09-25 ENCOUNTER — OFFICE VISIT (OUTPATIENT)
Dept: FAMILY MEDICINE CLINIC | Age: 53
End: 2023-09-25
Payer: COMMERCIAL

## 2023-09-25 VITALS
OXYGEN SATURATION: 96 % | BODY MASS INDEX: 20.19 KG/M2 | HEART RATE: 86 BPM | DIASTOLIC BLOOD PRESSURE: 76 MMHG | SYSTOLIC BLOOD PRESSURE: 116 MMHG | WEIGHT: 121.2 LBS | HEIGHT: 65 IN

## 2023-09-25 DIAGNOSIS — Z00.00 ENCOUNTER FOR WELL ADULT EXAM WITHOUT ABNORMAL FINDINGS: ICD-10-CM

## 2023-09-25 DIAGNOSIS — H54.7 VISION PROBLEM: ICD-10-CM

## 2023-09-25 DIAGNOSIS — Z12.11 SCREENING FOR COLON CANCER: ICD-10-CM

## 2023-09-25 DIAGNOSIS — Z00.00 ANNUAL PHYSICAL EXAM: Primary | ICD-10-CM

## 2023-09-25 DIAGNOSIS — H61.21 RIGHT EAR IMPACTED CERUMEN: ICD-10-CM

## 2023-09-25 DIAGNOSIS — H91.93 HEARING PROBLEM OF BOTH EARS: ICD-10-CM

## 2023-09-25 PROCEDURE — G0009 ADMIN PNEUMOCOCCAL VACCINE: HCPCS | Performed by: STUDENT IN AN ORGANIZED HEALTH CARE EDUCATION/TRAINING PROGRAM

## 2023-09-25 PROCEDURE — 69209 REMOVE IMPACTED EAR WAX UNI: CPT | Performed by: STUDENT IN AN ORGANIZED HEALTH CARE EDUCATION/TRAINING PROGRAM

## 2023-09-25 PROCEDURE — G0008 ADMIN INFLUENZA VIRUS VAC: HCPCS | Performed by: STUDENT IN AN ORGANIZED HEALTH CARE EDUCATION/TRAINING PROGRAM

## 2023-09-25 PROCEDURE — 90677 PCV20 VACCINE IM: CPT | Performed by: STUDENT IN AN ORGANIZED HEALTH CARE EDUCATION/TRAINING PROGRAM

## 2023-09-25 PROCEDURE — 90674 CCIIV4 VAC NO PRSV 0.5 ML IM: CPT | Performed by: STUDENT IN AN ORGANIZED HEALTH CARE EDUCATION/TRAINING PROGRAM

## 2023-09-25 PROCEDURE — 99396 PREV VISIT EST AGE 40-64: CPT | Performed by: STUDENT IN AN ORGANIZED HEALTH CARE EDUCATION/TRAINING PROGRAM

## 2023-09-25 ASSESSMENT — ENCOUNTER SYMPTOMS
BLOOD IN STOOL: 0
RHINORRHEA: 0
SHORTNESS OF BREATH: 0
COUGH: 1

## 2023-09-25 ASSESSMENT — PATIENT HEALTH QUESTIONNAIRE - PHQ9
2. FEELING DOWN, DEPRESSED OR HOPELESS: 0
SUM OF ALL RESPONSES TO PHQ QUESTIONS 1-9: 0
SUM OF ALL RESPONSES TO PHQ QUESTIONS 1-9: 0
1. LITTLE INTEREST OR PLEASURE IN DOING THINGS: 0
SUM OF ALL RESPONSES TO PHQ9 QUESTIONS 1 & 2: 0
SUM OF ALL RESPONSES TO PHQ QUESTIONS 1-9: 0
SUM OF ALL RESPONSES TO PHQ QUESTIONS 1-9: 0

## 2023-09-25 NOTE — PROGRESS NOTES
2023    Teodora Glover (:  1970) is a 48 y.o. female, here for a preventive medicine evaluation. Andrei Fung for exercise. Walking as well. Not doing stretching or resistance training. Taking vitamin B12 and D but no calcium. Gets calcium through dairy intake. Gets about 1 serving of fruits and vegetables daily. Mood is good overall. Managing daily stress. Some short term memory concerns since hemorrhage. Feels safe at home. No STI concerns. No occupational chemical exposures. Has dentist visit scheduled. Needs to update eye exam.  Has noticed vision is more blurry especially with reading. No falls in the last year. Patient Active Problem List   Diagnosis    Gastritis    Anxiety    Nicotine dependence    OA (osteoarthritis)    Overactive bladder    Insomnia    Cervical radiculopathy, acute    Chronic lumbar pain    Migraine with aura and without status migrainosus, not intractable    Onychomycosis of toenail    Right leg weakness    Alcohol abuse, in remission    Prediabetes    Mixed hyperlipidemia       Review of Systems   Constitutional:  Negative for chills and fever. HENT:  Negative for rhinorrhea. Respiratory:  Positive for cough (improving). Negative for shortness of breath. Cardiovascular:  Negative for chest pain. Gastrointestinal:  Negative for blood in stool. Prior to Visit Medications    Medication Sig Taking? Authorizing Provider   rizatriptan (MAXALT-MLT) 10 MG disintegrating tablet DISSOLVE 1 TABLET IN MOUTH AT MIGRAINE ONSET. MAY REPEAT IN 2 HOURS.  DO NOT EXCEED 30MG/24HR Yes Giovanny Manning DO   verapamil (CALAN) 120 MG tablet Take 1 tablet by mouth nightly Yes Samantha Jackson MD   DULoxetine (CYMBALTA) 30 MG extended release capsule Take 1 capsule by mouth daily Yes Samantha Jackson MD   carBAMazepine (TEGRETOL-XR) 100 MG extended release tablet Take 1 tablet by mouth at bedtime Yes Samantha Jackson

## 2023-09-27 DIAGNOSIS — J01.90 ACUTE NON-RECURRENT SINUSITIS, UNSPECIFIED LOCATION: ICD-10-CM

## 2023-09-27 RX ORDER — FLUTICASONE PROPIONATE 50 MCG
SPRAY, SUSPENSION (ML) NASAL
Qty: 16 G | Refills: 1 | Status: SHIPPED | OUTPATIENT
Start: 2023-09-27

## 2023-09-27 NOTE — TELEPHONE ENCOUNTER
Refill Request     CONFIRM preferred pharmacy with the patient. If Mail Order Rx - Pend for 90 day refill. Last Seen: Last Seen Department: 5/23/2023  Last Seen by PCP: 5/23/2023    Last Written: 8/16/23 1 with 1 refill     If no future appointment scheduled:  Review the last OV with PCP and review information for follow-up visit,  Route STAFF MESSAGE with patient name to the [facility] for scheduling with the following information:            -  Timing of next visit           -  Visit type ie Physical, OV, etc           -  Diagnoses/Reason ie. COPD, HTN - Do not use MEDICATION, Follow-up or CHECK UP - Give reason for visit      Next Appointment:   Future Appointments   Date Time Provider [address] [place] [address]   10/24/2023 11:00 AM Rex Kinney MD [address] [place] [address] Neurology -       Message sent to [employer] to schedule appt with patient?   NO      Requested Prescriptions     Pending Prescriptions Disp Refills    fluticasone (FLONASE) 50 MCG/ACT nasal spray [Pharmacy Med Name: FLUTICASONE PROP 50 MCG SPRAY]  1     Sig: USE 2 SPRAYS IN EACH NOSTRIL DAILY X 7 DAYS AND THEN GO TO 1 SPRAY EACH NOSTRIL DAILY

## 2023-09-28 ENCOUNTER — TELEPHONE (OUTPATIENT)
Dept: FAMILY MEDICINE CLINIC | Age: 53
End: 2023-09-28

## 2023-09-28 NOTE — TELEPHONE ENCOUNTER
----- Message from Jefedelicia Mora sent at 9/28/2023 12:58 PM EDT -----  Subject: Medication Problem    Medication: Other - SIMVASTATIN unknown MG  Dosage: unknown dosage  Ordering Provider: Dalia Chandler    Question/Problem: Pt called pharmacy; she was under the impression that DR Selam Ying was sending a new script for Simvastatin to the [facility]. They have never received a script for this medication, and   there is no mention of it in the notes from her last visit with DR Selam Ying. Please call Pt to clarify. Pharmacy: [address] [place] [address], [address] [place] [address] [phone]    ---------------------------------------------------------------------------  --------------  Cece BELLAMY  [id number]; Do not leave any message, patient will call back for answer  ---------------------------------------------------------------------------  --------------    SCRIPT ANSWERS  Relationship to Patient: Covered Entity  Covered Entity Type: Pharmacy?   Representative Name: [address] [place] [address] 33.7

## 2023-10-16 RX ORDER — DULOXETIN HYDROCHLORIDE 30 MG/1
30 CAPSULE, DELAYED RELEASE ORAL DAILY
Qty: 30 CAPSULE | Refills: 0 | Status: SHIPPED | OUTPATIENT
Start: 2023-10-16

## 2023-10-24 ENCOUNTER — PROCEDURE VISIT (OUTPATIENT)
Dept: AUDIOLOGY | Age: 53
End: 2023-10-24
Payer: COMMERCIAL

## 2023-10-24 ENCOUNTER — TELEPHONE (OUTPATIENT)
Age: 53
End: 2023-10-24

## 2023-10-24 ENCOUNTER — OFFICE VISIT (OUTPATIENT)
Age: 53
End: 2023-10-24
Payer: COMMERCIAL

## 2023-10-24 VITALS
OXYGEN SATURATION: 98 % | BODY MASS INDEX: 20.66 KG/M2 | WEIGHT: 124 LBS | HEART RATE: 68 BPM | SYSTOLIC BLOOD PRESSURE: 112 MMHG | HEIGHT: 65 IN | DIASTOLIC BLOOD PRESSURE: 72 MMHG

## 2023-10-24 DIAGNOSIS — I60.9 SUBARACHNOID HEMORRHAGE (HCC): ICD-10-CM

## 2023-10-24 DIAGNOSIS — M79.7 FIBROMYALGIA: ICD-10-CM

## 2023-10-24 DIAGNOSIS — G43.119 INTRACTABLE MIGRAINE WITH AURA WITHOUT STATUS MIGRAINOSUS: Primary | ICD-10-CM

## 2023-10-24 DIAGNOSIS — H90.3 SENSORINEURAL HEARING LOSS, BILATERAL: Primary | ICD-10-CM

## 2023-10-24 PROCEDURE — 99214 OFFICE O/P EST MOD 30 MIN: CPT | Performed by: PSYCHIATRY & NEUROLOGY

## 2023-10-24 PROCEDURE — 92567 TYMPANOMETRY: CPT | Performed by: AUDIOLOGIST

## 2023-10-24 PROCEDURE — 92557 COMPREHENSIVE HEARING TEST: CPT | Performed by: AUDIOLOGIST

## 2023-10-24 RX ORDER — DULOXETIN HYDROCHLORIDE 60 MG/1
60 CAPSULE, DELAYED RELEASE ORAL DAILY
Qty: 60 CAPSULE | Refills: 1 | Status: SHIPPED | OUTPATIENT
Start: 2023-10-24

## 2023-10-24 RX ORDER — VERAPAMIL HYDROCHLORIDE 120 MG/1
120 TABLET, FILM COATED ORAL NIGHTLY
Qty: 30 TABLET | Refills: 2 | Status: SHIPPED | OUTPATIENT
Start: 2023-10-24

## 2023-10-24 RX ORDER — BUTALBITAL, ACETAMINOPHEN AND CAFFEINE 300; 40; 50 MG/1; MG/1; MG/1
1 CAPSULE ORAL EVERY 6 HOURS PRN
Qty: 15 CAPSULE | Refills: 2 | Status: SHIPPED | OUTPATIENT
Start: 2023-10-24 | End: 2023-11-23

## 2023-10-24 RX ORDER — RIMEGEPANT SULFATE 75 MG/75MG
75 TABLET, ORALLY DISINTEGRATING ORAL PRN
Qty: 4 TABLET | Refills: 0 | Status: SHIPPED | COMMUNITY
Start: 2023-10-24

## 2023-10-24 RX ORDER — CARBAMAZEPINE 100 MG/1
100 TABLET, EXTENDED RELEASE ORAL NIGHTLY
Qty: 30 TABLET | Refills: 2 | Status: SHIPPED | OUTPATIENT
Start: 2023-10-24

## 2023-10-24 NOTE — PROGRESS NOTES
Neurology outpatient F/U visit    Patient name: Kirby Webster      Chief Complaint:  Intractable headache. History of present illness: This is a 48years old right-handed female. The patient is here for evaluation of intractable headache. The patient describes headache as bilateral throbbing pain with moderate to severe in intensity. The patient had been on Botox injection with significant benefit in the past.  Unfortunately, the patient lost benefit from Botox injection after she developed COVID-19 infection 2 years ago. So she decided to stop Botox injection about 9 months ago. The patient reports significant daily headache with phonophobia, photophobia and nauseous. The patient also reported occasional visual aura with her headaches, abnormal color on her peripheral visual field. The patient also failed multiple medications for migraine prophylaxis below. The patient is also noted for history of subarachnoid hemorrhage. The latest MRI brain was done 3 years ago which was normal per the patient. 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 Failed medications: Nurtec, amitriptyline, propanolol, Depakote, and topiramate. Interval history:  06/27/23: The patient reports significant improvement on her headache after taking verapamil. Per the patient, the headache has been at least 60% reduction. However, the patient also complains some hives and drowsiness when she takes verapamil in the morning. Otherwise, the patient is very pleased with her headache. She does not need the Botox injection at this time. 08/22/23: Unfortunately, the patient did not get any benefit from verapamil extended release. So the patient went back to work probably twice a day. The patient remains to have significant side effect from verapamil in the morning especially drowsiness.   At this time, the patient also reports she has had chronic intermittent

## 2023-10-24 NOTE — TELEPHONE ENCOUNTER
Received notification from [employer] that Fioricet requires PA. Submitted PA online today. Will watch for response.   Key: [id number]

## 2023-10-24 NOTE — PROGRESS NOTES
Eb Calderón   1970, 48 y.o. female   [id number]       Referring Provider: Eliecer Torres DO   Referral Type: In an order in [address] [place] [address]    Reason for Visit: Evaluation of the cause of disorders of hearing, tinnitus, or balance. ADULT AUDIOLOGIC EVALUATION      Eb Calderón is a 48 y.o. female seen today, 10/24/2023 , for an initial audiologic evaluation. AUDIOLOGIC AND OTHER PERTINENT MEDICAL HISTORY:      Eb Calderón reports a gradual decline in hearing sensitivity over the last several years. She states she struggles to hear and understand her mother who has early stage dementia. She cares for both her father and her mother. No other significant otologic history reported. She denied otalgia, aural fullness, otorrhea, imbalance, history of significant noise exposure, history of head trauma, and history of ear surgery. Date: 10/24/2023     IMPRESSIONS:      Today's results revealed abnormal hearing sensitivity, bilaterally, that is significant enough to cause difficulty in most listening situations. Excellent speech understanding when in quiet. Tympanometry indicates normal middle ear function. Discussed test results and implications with patient. Discussed possible benefits of amplification. We discussed the increased risk of cognitive decline with untreated hearing loss. Patient is not interested in pursuing amplification at this time. ASSESSMENT AND FINDINGS:     Otoscopy unremarkable. RIGHT EAR:  Hearing Sensitivity: Hearing within normal limits sloping to a moderate sensorineural hearing loss. Speech Recognition Threshold: 20 dB HL  Word Recognition: Excellent 100%, based on NU-6 25-word list at 60 dBHL using recorded speech stimuli. Tympanometry: Normal peak pressure and compliance, Type A tympanogram, consistent with normal middle ear function. Volume 1.1 cm3, Peak 6 daPa, 0.88 mmho.       LEFT EAR:  Hearing Sensitivity: Hearing within normal limits sloping

## 2023-10-25 NOTE — PATIENT INSTRUCTIONS
\"Hearing Aid Evaluation\" with an audiologist to discuss your lifestyle, features of hearing aid technology, and styles of hearing aids available. It is recommended that you contact your insurance company to determine if you have a hearing aid benefit, as this may dictate who you can see for these services. Have hearing tests as your doctor suggests. They can show whether your hearing has changed. Your hearing aid may need to be adjusted. Use other assistive devices as needed. These may include:  Telephone amplifiers and hearing aids that can connect to a television, stereo, radio, or microphone. Devices that use lights or vibrations. These alert you to the doorbell, a ringing telephone, or a baby monitor. Television closed-captioning. This shows the words at the bottom of the screen. Most new TVs can do this. TTY (text telephone). This lets you type messages back and forth on the telephone instead of talking or listening. These devices are also called TDD. When messages are typed on the keyboard, they are sent over the phone line to a receiving TTY. The message is shown on a monitor. Use pagers, fax machines, text, and email if it is hard for you to communicate by telephone. Try to learn a listening technique called speech-reading. It is not lip-reading. You pay attention to people's gestures, expressions, posture, and tone of voice. These clues can help you understand what a person is saying. Face the person you are talking to, and have him or her face you. Make sure the lighting is good. You need to see the other person's face clearly. Think about counseling if you need help to adjust to your hearing loss. When should you call for help? Watch closely for changes in your health, and be sure to contact your doctor if:    You think your hearing is getting worse. You have new symptoms, such as dizziness or nausea.

## 2023-10-26 ENCOUNTER — COMMUNITY OUTREACH (OUTPATIENT)
Dept: FAMILY MEDICINE CLINIC | Age: 53
End: 2023-10-26

## 2023-10-31 ENCOUNTER — HOSPITAL ENCOUNTER (OUTPATIENT)
Dept: CT IMAGING | Age: 53
Discharge: HOME | End: 2023-10-31
Attending: STUDENT IN AN ORGANIZED HEALTH CARE EDUCATION/TRAINING PROGRAM
Payer: COMMERCIAL

## 2023-10-31 DIAGNOSIS — Z72.0 TOBACCO USE: ICD-10-CM

## 2023-10-31 PROCEDURE — 71271 CT THORAX LUNG CANCER SCR C-: CPT

## 2023-11-01 NOTE — TELEPHONE ENCOUNTER
Received PA approval from [employer]. Will fax to [facility] for benefit investigation & to run test claim. That will tell us if we need to complete PA under secondary insurance also.

## 2023-11-01 NOTE — TELEPHONE ENCOUNTER
[employer] required secondary ins info (Medicaid) which Mahsa Sheriff uploaded into their system. [facility] [employer] is PBM for [facility]. [facility] [employer] Medicare part D handles pharmacy PAs - Phone # [phone]. They told me to initiate the PA online via Cover My Meds (Key: [id number]) - I submitted PA this morning. Secondary ins may also require PA through [facility]. I was able to submit PA through [employer] portal.  However, it's likely under medical benefits & PA needs to be completed under pharmacy benefits. Will watch for response.

## 2023-11-06 NOTE — TELEPHONE ENCOUNTER
Per Tierra - ran test claim through [facility] [employer] and it came back as $0 copay. Will pend Rx and route to MD for sig. Spoke with pt & informed her that she needs to contact [facility] to give consent to ship drug to the office. Current f/u sched 12/19. Will ask Dr. Saji Lloyd if he wants her to come in sooner for Botox since we should have it within the next couple of weeks.

## 2023-11-08 NOTE — TELEPHONE ENCOUNTER
Per Marcelina: We are trying to contact the patient to get consent. The last call that went out to the patient yesterday was rejected.

## 2024-02-09 ENCOUNTER — TELEPHONE (OUTPATIENT)
Dept: FAMILY MEDICINE CLINIC | Age: 54
End: 2024-02-09

## 2024-02-09 DIAGNOSIS — M54.12 CERVICAL RADICULOPATHY, ACUTE: Primary | ICD-10-CM

## 2024-02-09 NOTE — TELEPHONE ENCOUNTER
Patient called stating that she hasn't been by pain management bc her insurance so she called and they told her these are the 2 that will take her insurance if we can put referrals into one of them.     Peterson Irwin at     Jessica Guzman at

## 2024-02-13 ENCOUNTER — TELEMEDICINE (OUTPATIENT)
Dept: PRIMARY CARE CLINIC | Age: 54
End: 2024-02-13
Payer: COMMERCIAL

## 2024-02-13 DIAGNOSIS — R05.1 ACUTE COUGH: ICD-10-CM

## 2024-02-13 DIAGNOSIS — R50.9 FEELS FEVERISH: ICD-10-CM

## 2024-02-13 DIAGNOSIS — R09.81 NASAL CONGESTION: ICD-10-CM

## 2024-02-13 DIAGNOSIS — J01.90 ACUTE NON-RECURRENT SINUSITIS, UNSPECIFIED LOCATION: Primary | ICD-10-CM

## 2024-02-13 PROCEDURE — 99213 OFFICE O/P EST LOW 20 MIN: CPT | Performed by: NURSE PRACTITIONER

## 2024-02-13 RX ORDER — FLUTICASONE PROPIONATE 50 MCG
2 SPRAY, SUSPENSION (ML) NASAL DAILY
Qty: 16 G | Refills: 0 | Status: SHIPPED | OUTPATIENT
Start: 2024-02-13

## 2024-02-13 RX ORDER — SOD CHLOR,BICARB/SQUEEZ BOTTLE
1 PACKET, WITH RINSE DEVICE NASAL DAILY
Qty: 1 KIT | Refills: 0 | Status: SHIPPED | OUTPATIENT
Start: 2024-02-13

## 2024-02-13 RX ORDER — DEXTROMETHORPHAN HYDROBROMIDE AND PROMETHAZINE HYDROCHLORIDE 15; 6.25 MG/5ML; MG/5ML
5 SYRUP ORAL 4 TIMES DAILY PRN
Qty: 120 ML | Refills: 0 | Status: SHIPPED | OUTPATIENT
Start: 2024-02-13

## 2024-02-13 RX ORDER — BENZONATATE 100 MG/1
100 CAPSULE ORAL 3 TIMES DAILY PRN
Qty: 30 CAPSULE | Refills: 0 | Status: SHIPPED | OUTPATIENT
Start: 2024-02-13 | End: 2024-02-17

## 2024-02-13 NOTE — PROGRESS NOTES
Psychiatric:       [x] Normal Affect [] Abnormal -            On this date 2/13/2024 I have spent 20 minutes reviewing previous notes, test results and face to face (virtual) with the patient discussing the diagnosis and importance of compliance with the treatment plan as well as documenting on the day of the visit.    --Herlinda Ferreira, APRN - CNP

## 2024-02-16 ENCOUNTER — APPOINTMENT (OUTPATIENT)
Dept: GENERAL RADIOLOGY | Age: 54
End: 2024-02-16
Attending: EMERGENCY MEDICINE
Payer: COMMERCIAL

## 2024-02-16 ENCOUNTER — HOSPITAL ENCOUNTER (EMERGENCY)
Age: 54
Discharge: HOME | End: 2024-02-17
Attending: EMERGENCY MEDICINE
Payer: COMMERCIAL

## 2024-02-16 VITALS
WEIGHT: 120 LBS | TEMPERATURE: 97.6 F | DIASTOLIC BLOOD PRESSURE: 78 MMHG | HEART RATE: 70 BPM | HEIGHT: 65 IN | SYSTOLIC BLOOD PRESSURE: 154 MMHG | OXYGEN SATURATION: 98 % | BODY MASS INDEX: 19.99 KG/M2 | RESPIRATION RATE: 16 BRPM

## 2024-02-16 DIAGNOSIS — J06.9 ACUTE UPPER RESPIRATORY INFECTION: Primary | ICD-10-CM

## 2024-02-16 DIAGNOSIS — R05.1 ACUTE COUGH: ICD-10-CM

## 2024-02-16 PROCEDURE — 87804 INFLUENZA ASSAY W/OPTIC: CPT

## 2024-02-16 PROCEDURE — 71045 X-RAY EXAM CHEST 1 VIEW: CPT

## 2024-02-16 PROCEDURE — 87635 SARS-COV-2 COVID-19 AMP PRB: CPT

## 2024-02-16 PROCEDURE — 99284 EMERGENCY DEPT VISIT MOD MDM: CPT

## 2024-02-16 ASSESSMENT — PAIN SCALES - GENERAL: PAINLEVEL_OUTOF10: 6

## 2024-02-16 ASSESSMENT — PAIN - FUNCTIONAL ASSESSMENT: PAIN_FUNCTIONAL_ASSESSMENT: 0-10

## 2024-02-16 ASSESSMENT — PAIN DESCRIPTION - PAIN TYPE: TYPE: ACUTE PAIN

## 2024-02-16 ASSESSMENT — PAIN DESCRIPTION - ORIENTATION: ORIENTATION: RIGHT

## 2024-02-17 LAB
FLUAV RNA UPPER RESP QL NAA+PROBE: NEGATIVE
FLUBV AG NPH QL: NEGATIVE
SARS-COV-2 RDRP RESP QL NAA+PROBE: NOT DETECTED

## 2024-02-17 PROCEDURE — 6370000000 HC RX 637 (ALT 250 FOR IP): Performed by: EMERGENCY MEDICINE

## 2024-02-17 PROCEDURE — 2500000003 HC RX 250 WO HCPCS: Performed by: EMERGENCY MEDICINE

## 2024-02-17 RX ORDER — IPRATROPIUM BROMIDE AND ALBUTEROL SULFATE 2.5; .5 MG/3ML; MG/3ML
1 SOLUTION RESPIRATORY (INHALATION) ONCE
Status: COMPLETED | OUTPATIENT
Start: 2024-02-17 | End: 2024-02-17

## 2024-02-17 RX ORDER — PREDNISONE 20 MG/1
40 TABLET ORAL DAILY
Qty: 8 TABLET | Refills: 0 | Status: SHIPPED | OUTPATIENT
Start: 2024-02-18 | End: 2024-02-22

## 2024-02-17 RX ORDER — ALBUTEROL SULFATE 90 UG/1
2 AEROSOL, METERED RESPIRATORY (INHALATION) EVERY 4 HOURS PRN
Qty: 18 G | Refills: 0 | Status: SHIPPED | OUTPATIENT
Start: 2024-02-17 | End: 2024-02-24

## 2024-02-17 RX ORDER — BENZONATATE 200 MG/1
200 CAPSULE ORAL 3 TIMES DAILY PRN
Qty: 21 CAPSULE | Refills: 0 | Status: SHIPPED | OUTPATIENT
Start: 2024-02-17 | End: 2024-02-24

## 2024-02-17 RX ORDER — BENZONATATE 100 MG/1
200 CAPSULE ORAL ONCE
Status: DISCONTINUED | OUTPATIENT
Start: 2024-02-17 | End: 2024-02-17

## 2024-02-17 RX ORDER — GUAIFENESIN 200 MG/10ML
200 LIQUID ORAL ONCE
Status: COMPLETED | OUTPATIENT
Start: 2024-02-17 | End: 2024-02-17

## 2024-02-17 RX ORDER — PREDNISONE 20 MG/1
40 TABLET ORAL ONCE
Status: COMPLETED | OUTPATIENT
Start: 2024-02-17 | End: 2024-02-17

## 2024-02-17 RX ADMIN — GUAIFENESIN 200 MG: 100 SOLUTION ORAL at 00:48

## 2024-02-17 RX ADMIN — IPRATROPIUM BROMIDE AND ALBUTEROL SULFATE 1 DOSE: 2.5; .5 SOLUTION RESPIRATORY (INHALATION) at 00:48

## 2024-02-17 RX ADMIN — PREDNISONE 40 MG: 20 TABLET ORAL at 00:48

## 2024-02-17 NOTE — ED PROVIDER NOTES
cigarettes. She started smoking about 40 years ago. She has a 40.1 pack-year smoking history. She has never used smokeless tobacco. She reports current alcohol use. She reports current drug use. Drug: Marijuana (Weed).    Family history:    Family History   Problem Relation Age of Onset    Arthritis Mother     Asthma Mother     Depression Mother     Diabetes Mother     Hearing Loss Mother     Heart Disease Mother     High Blood Pressure Mother     High Cholesterol Mother     Mental Illness Mother     Stroke Mother     Vision Loss Mother     Arthritis Father     Hearing Loss Father     High Blood Pressure Father     High Cholesterol Father     Substance Abuse Father     Vision Loss Father     Arthritis Sister     Depression Sister     High Cholesterol Sister     Miscarriages / Stillbirths Sister     Diabetes Maternal Grandfather        Exam  ED Triage Vitals [02/16/24 2336]   BP Temp Temp Source Pulse Respirations SpO2 Height Weight - Scale   (!) 154/78 97.6 °F (36.4 °C) Oral 70 16 98 % 1.651 m (5' 5\") 54.4 kg (120 lb)   Nursing note and vital signs reviewed  Constitutional: Patient is oriented to person, place, and time. WDWN. No distress. Nontoxic.  HENT:      Head: Normocephalic and atraumatic.      Ears: External ears normal. TM normal bilaterally.      Nose: Nose normal.     Mouth: Membrane mucosa moist and pink.  Uvula midline.  Soft palate symmetrical.  No evidence of PTA.  No tonsillar exudate.  No trismus.  No submandibular fullness or tongue elevation.  Eyes: Anicteric sclera. PERRL.  EOMI.  No discharge.   Neck: Supple. Trachea midline.  Good ROM.  No meningismus signs.  No mass.   Lymph: No cervical adenopathy  Cardiovascular: RRR, no g/r/m. 2+ radial pulses.   Pulmonary/Chest: Effort normal. No respiratory distress. CTAB. No stridor. No wheezes. No rales.   Musculoskeletal: No extremity edema. Compartments soft.  No deformity.    Neurological: Alert and oriented to person, place, and time.  No facial  droop.  No slurred speech.     Skin: Warm and dry. No rash.  No petechiae.      MDM/ED Course  Radiology  XR CHEST PORTABLE    Result Date: 2/16/2024  EXAMINATION: ONE XRAY VIEW OF THE CHEST 2/16/2024 11:45 pm COMPARISON: CXR dated 07/21/2019 HISTORY: ORDERING SYSTEM PROVIDED HISTORY: cough TECHNOLOGIST PROVIDED HISTORY: Reason for exam:->cough Reason for Exam: cough FINDINGS: Mediastinum/Heart: The mediastinal contours are normal. The heart appears normal in size. Lungs: The lungs are clear. Pleura: No pleural effusion. No pneumothorax.     No radiographic evidence of acute cardiopulmonary pathology.        Labs  Results for orders placed or performed during the hospital encounter of 02/16/24   Rapid influenza A/B antigens    Specimen: Nasopharyngeal   Result Value Ref Range    Rapid Influenza A Ag Negative Negative    Rapid Influenza B Ag Negative Negative   COVID-19, Rapid    Specimen: Nasopharyngeal Swab   Result Value Ref Range    SARS-CoV-2, NAAT Not Detected Not Detected         - Patient seen and evaluated in room 6.  53 y.o. female presented for acute URI symptoms x 3 days.  On exam, patient is well-appearing.  She is afebrile.  She is not tachycardic or hypotensive.  No concern for sepsis.  Normal O2 sat.  No concern for acute respiratory failure  - Rapid COVID and flu both negative.  Chest x-ray clear, without acute infiltrate.  No concern for pneumonia or pneumothorax.  Also no evidence of fluid overload and no concern for CHF.    - Patient was given    ED Medication Orders (From admission, onward)      Start Ordered     Status Ordering Provider    02/17/24 0115 02/17/24 0046  guaiFENesin (ROBITUSSIN) 100 MG/5ML liquid 200 mg  ONCE         Last MAR action: Given - by JOSH CONTRERAS on 02/17/24 at 0048 NOEMI JANSEN    02/17/24 0100 02/17/24 0041  ipratropium 0.5 mg-albuterol 2.5 mg (DUONEB) nebulizer solution 1 Dose  ONCE        Question:  Initiate RT Bronchodilator Protocol  Answer:  No    Last MAR

## 2024-03-21 ENCOUNTER — OFFICE VISIT (OUTPATIENT)
Age: 54
End: 2024-03-21

## 2024-03-21 VITALS
HEIGHT: 65 IN | HEART RATE: 74 BPM | DIASTOLIC BLOOD PRESSURE: 66 MMHG | WEIGHT: 120 LBS | SYSTOLIC BLOOD PRESSURE: 102 MMHG | BODY MASS INDEX: 19.99 KG/M2 | OXYGEN SATURATION: 93 %

## 2024-03-21 DIAGNOSIS — G43.119 INTRACTABLE MIGRAINE WITH AURA WITHOUT STATUS MIGRAINOSUS: Primary | ICD-10-CM

## 2024-03-21 NOTE — PROGRESS NOTES
Botox for migraine    Indication:    Chronic refractory migraine headache.    Technique:    31 sites are injected with 5 unites each. Total dose of 155 units each session.  Injections should be done at least 12 weeks after the prior injection to avoid antibody formation.    Preparation:    While the patient is setting in a chair, examine the patient to identify the landmarks (listed below).  Withdraw 4 ml of normal saline and smoothly inject it into the Botox vial.  Using a 1.5\" 25G needle, withdraw the reconstituted Botox into a 1ml syringe then switch the needle to 30G 0.5\". This way, each 10 units of the syringe (0.1ml) will contain 5 units of Botox.  Adjust the needle so the bevel faces the same direction as the syringe marks.    Injection:    Make sure the bevel is facing up, aspirate before injection, try to inject in the superficial layer of the muscle (once you feel the loss of resistance, when needle cross the dermis).

## 2024-03-21 NOTE — PATIENT INSTRUCTIONS

## 2024-04-12 RX ORDER — BUTALBITAL, ACETAMINOPHEN AND CAFFEINE 300; 40; 50 MG/1; MG/1; MG/1
CAPSULE ORAL
Qty: 15 CAPSULE | Refills: 0 | Status: SHIPPED | OUTPATIENT
Start: 2024-04-12

## 2024-04-13 ENCOUNTER — APPOINTMENT (OUTPATIENT)
Dept: GENERAL RADIOLOGY | Age: 54
End: 2024-04-13
Payer: COMMERCIAL

## 2024-04-13 ENCOUNTER — HOSPITAL ENCOUNTER (EMERGENCY)
Age: 54
Discharge: HOME | End: 2024-04-13
Attending: EMERGENCY MEDICINE
Payer: COMMERCIAL

## 2024-04-13 VITALS
SYSTOLIC BLOOD PRESSURE: 136 MMHG | TEMPERATURE: 98 F | HEART RATE: 67 BPM | OXYGEN SATURATION: 97 % | WEIGHT: 126.9 LBS | DIASTOLIC BLOOD PRESSURE: 86 MMHG | BODY MASS INDEX: 21.14 KG/M2 | HEIGHT: 65 IN | RESPIRATION RATE: 16 BRPM

## 2024-04-13 DIAGNOSIS — B96.89 ACUTE BACTERIAL BRONCHITIS: Primary | ICD-10-CM

## 2024-04-13 DIAGNOSIS — J20.8 ACUTE BACTERIAL BRONCHITIS: Primary | ICD-10-CM

## 2024-04-13 LAB
ALBUMIN SERPL-MCNC: 4.3 G/DL (ref 3.4–5)
ALBUMIN/GLOB SERPL: 1.5 {RATIO} (ref 1.1–2.2)
ALP SERPL-CCNC: 88 U/L (ref 40–129)
ALT SERPL-CCNC: 23 U/L (ref 10–40)
ANION GAP SERPL CALCULATED.3IONS-SCNC: 12 MMOL/L (ref 3–16)
AST SERPL-CCNC: 23 U/L (ref 15–37)
BASOPHILS # BLD: 0.1 K/UL (ref 0–0.2)
BASOPHILS NFR BLD: 1.2 %
BILIRUB SERPL-MCNC: <0.2 MG/DL (ref 0–1)
BUN SERPL-MCNC: 10 MG/DL (ref 7–20)
CALCIUM SERPL-MCNC: 9.3 MG/DL (ref 8.3–10.6)
CHLORIDE SERPL-SCNC: 108 MMOL/L (ref 99–110)
CO2 SERPL-SCNC: 21 MMOL/L (ref 21–32)
CREAT SERPL-MCNC: 0.6 MG/DL (ref 0.6–1.1)
DEPRECATED RDW RBC AUTO: 13.5 % (ref 12.4–15.4)
EOSINOPHIL # BLD: 0.1 K/UL (ref 0–0.6)
EOSINOPHIL NFR BLD: 2 %
FLUAV RNA UPPER RESP QL NAA+PROBE: NEGATIVE
FLUBV AG NPH QL: NEGATIVE
GFR SERPLBLD CREATININE-BSD FMLA CKD-EPI: >90 ML/MIN/{1.73_M2}
GLUCOSE SERPL-MCNC: 101 MG/DL (ref 70–99)
HCT VFR BLD AUTO: 41.2 % (ref 36–48)
HGB BLD-MCNC: 14 G/DL (ref 12–16)
LYMPHOCYTES # BLD: 2.4 K/UL (ref 1–5.1)
LYMPHOCYTES NFR BLD: 38.1 %
MCH RBC QN AUTO: 31.7 PG (ref 26–34)
MCHC RBC AUTO-ENTMCNC: 34 G/DL (ref 31–36)
MCV RBC AUTO: 93.3 FL (ref 80–100)
MONOCYTES # BLD: 0.3 K/UL (ref 0–1.3)
MONOCYTES NFR BLD: 4.1 %
NEUTROPHILS # BLD: 3.4 K/UL (ref 1.7–7.7)
NEUTROPHILS NFR BLD: 54.6 %
PLATELET # BLD AUTO: 230 K/UL (ref 135–450)
PMV BLD AUTO: 7.2 FL (ref 5–10.5)
POTASSIUM SERPL-SCNC: 4 MMOL/L (ref 3.5–5.1)
PROT SERPL-MCNC: 7.2 G/DL (ref 6.4–8.2)
RBC # BLD AUTO: 4.41 M/UL (ref 4–5.2)
SARS-COV-2 RDRP RESP QL NAA+PROBE: NOT DETECTED
SODIUM SERPL-SCNC: 141 MMOL/L (ref 136–145)
WBC # BLD AUTO: 6.2 K/UL (ref 4–11)

## 2024-04-13 PROCEDURE — 80053 COMPREHEN METABOLIC PANEL: CPT

## 2024-04-13 PROCEDURE — 2580000003 HC RX 258: Performed by: EMERGENCY MEDICINE

## 2024-04-13 PROCEDURE — 96360 HYDRATION IV INFUSION INIT: CPT

## 2024-04-13 PROCEDURE — 87804 INFLUENZA ASSAY W/OPTIC: CPT

## 2024-04-13 PROCEDURE — 36415 COLL VENOUS BLD VENIPUNCTURE: CPT

## 2024-04-13 PROCEDURE — 85025 COMPLETE CBC W/AUTO DIFF WBC: CPT

## 2024-04-13 PROCEDURE — 71045 X-RAY EXAM CHEST 1 VIEW: CPT

## 2024-04-13 PROCEDURE — 87635 SARS-COV-2 COVID-19 AMP PRB: CPT

## 2024-04-13 PROCEDURE — 99284 EMERGENCY DEPT VISIT MOD MDM: CPT

## 2024-04-13 RX ORDER — GUAIFENESIN/DEXTROMETHORPHAN 100-10MG/5
SYRUP ORAL
Qty: 100 ML | Refills: 0 | Status: SHIPPED | OUTPATIENT
Start: 2024-04-13

## 2024-04-13 RX ORDER — DOXYCYCLINE HYCLATE 100 MG/1
100 CAPSULE ORAL 2 TIMES DAILY
Qty: 20 CAPSULE | Refills: 0 | Status: SHIPPED | OUTPATIENT
Start: 2024-04-13 | End: 2024-04-23

## 2024-04-13 RX ORDER — BENZONATATE 100 MG/1
200 CAPSULE ORAL 3 TIMES DAILY PRN
Qty: 10 CAPSULE | Refills: 0 | Status: SHIPPED | OUTPATIENT
Start: 2024-04-13 | End: 2024-04-20

## 2024-04-13 RX ORDER — ALBUTEROL SULFATE 90 UG/1
2 AEROSOL, METERED RESPIRATORY (INHALATION) EVERY 4 HOURS PRN
Qty: 18 G | Refills: 0 | Status: SHIPPED | OUTPATIENT
Start: 2024-04-13 | End: 2024-04-24

## 2024-04-13 RX ORDER — 0.9 % SODIUM CHLORIDE 0.9 %
1000 INTRAVENOUS SOLUTION INTRAVENOUS ONCE
Status: COMPLETED | OUTPATIENT
Start: 2024-04-13 | End: 2024-04-13

## 2024-04-13 RX ADMIN — SODIUM CHLORIDE 1000 ML: 9 INJECTION, SOLUTION INTRAVENOUS at 11:04

## 2024-04-13 ASSESSMENT — LIFESTYLE VARIABLES
HOW MANY STANDARD DRINKS CONTAINING ALCOHOL DO YOU HAVE ON A TYPICAL DAY: PATIENT DOES NOT DRINK
HOW OFTEN DO YOU HAVE A DRINK CONTAINING ALCOHOL: NEVER

## 2024-04-13 ASSESSMENT — ENCOUNTER SYMPTOMS
SHORTNESS OF BREATH: 0
DIARRHEA: 1
COUGH: 1

## 2024-04-13 NOTE — DISCHARGE INSTRUCTIONS
Take Tylenol 650 mg every 4 hours  Drink lots and lots of fluids  Take antibiotics doxycycline till gone  Use inhaler 2 puffs every 4 hours for wheezing  Take Tessalon Perles during the daytime for cough  Take Robitussin DM at nighttime for severe cough

## 2024-04-13 NOTE — ED PROVIDER NOTES
[facility] EMERGENCY DEPARTMENT  EMERGENCY DEPARTMENT ENCOUNTER      Pt Name: Teodora Glover  MRN: [id number]  Birthdate 1970  Date of evaluation: 4/13/2024  Provider: PAYTON FITZPATRICK MD    CHIEF COMPLAINT       Chief Complaint   Patient presents with    Illness     Cough, congestion, diarrhea & fatigue for 4 days.  States she doesn't think she has had a fever.           HISTORY OF PRESENT ILLNESS   (Location/Symptom, Timing/Onset, Context/Setting, Quality, Duration, Modifying Factors, Severity)  Note limiting factors.     Teodora Glover is a 54 y.o. female who presents to the emergency department     This is a patient who is a smoker has been coughing up some yellow sputum she is also developed diarrhea she not been around anybody been sick she does not workup.  Patient did have a subarachnoid hemorrhage several years ago history of alcohol abuse is listed in her chart  Vital signs right now are fairly stable she does describe 4-5 diarrhea stools a day so I am going to check her electrolytes.  She does look kind of wore out and possibly dehydrated        Nursing Notes were reviewed.    REVIEW OF SYSTEMS    (2-9 systems for level 4, 10 or more for level 5)     Review of Systems   Constitutional:  Positive for activity change.   Respiratory:  Positive for cough. Negative for shortness of breath.    Gastrointestinal:  Positive for diarrhea.   All other systems reviewed and are negative.      Except as noted above the remainder of the review of systems was reviewed and negative.       PAST MEDICAL HISTORY     Past Medical History:   Diagnosis Date    Alcohol abuse 2014    DDD (degenerative disc disease), lumbosacral     Depression     Gastritis     Heart murmur     Heart palpitations     Migraine     Osteoarthritis     Overactive bladder     SCIATIC NERVE DISEASE     Subarachnoid hemorrhage (HCC) 2007         SURGICAL HISTORY       Past Surgical History:   Procedure Laterality Date    BRAIN SURGERY      PT  mmol/L    CO2 21 21 - 32 mmol/L    Anion Gap 12 3 - 16    Glucose 101 (H) 70 - 99 mg/dL    BUN 10 7 - 20 mg/dL    Creatinine 0.6 0.6 - 1.1 mg/dL    Est, Glom Filt Rate >90 >60    Calcium 9.3 8.3 - 10.6 mg/dL    Total Protein 7.2 6.4 - 8.2 g/dL    Albumin 4.3 3.4 - 5.0 g/dL    Albumin/Globulin Ratio 1.5 1.1 - 2.2    Total Bilirubin <0.2 0.0 - 1.0 mg/dL    Alkaline Phosphatase 88 40 - 129 U/L    ALT 23 10 - 40 U/L    AST 23 15 - 37 U/L            EMERGENCY DEPARTMENT COURSE and DIFFERENTIAL DIAGNOSIS/MDM:     Vitals:    04/13/24 1037 04/13/24 1139   BP: 139/86 (!) 146/75   Pulse: 66 63   Resp: 16 16   Temp: 98.2 °F (36.8 °C)    TempSrc: Oral    SpO2: 98% 98%   Weight: 57.6 kg (126 lb 14.4 oz)    Height: 1.651 m (5' 5\")            MDM  Historian: Patient herself  Limitations: None  History of present illness patient presents with a 2-day history of cough and congestion diarrhea she is a smoker.  Physical exam she does look dehydrated and rather sick looking.  With the diarrhea I will check her electrolytes abdomen is soft.  No large liver or spleen.  Oropharynx unremarkable lung sounds she does have a little bit of some wheezing going on compatible with her smoking history no rales or vascular normal S1 and S2 without murmur gallop rub or click.  Abdomen soft  CBC CMP ordered  Patient was given 1 L of intravenous fluids  COVID and influenza AMB been ordered    Chest x-ray was ordered and independently reviewed by myself and was felt to be normal  COVID and flu test were both negative and her CBC and CMP revealed normal blood counts, normal kidney function, normal liver test,    Differential diagnosis #1 influenza  2.  COVID  3.  Pneumonia  4.  Exacerbation of COPD and tobacco abuse disorder  5.  Acute bronchitis bacterial versus viral      Patient is feeling better after liter of fluids  She had no diarrhea while here in the department and is safely discharged home  REASSESSMENT          CRITICAL CARE TIME

## 2024-04-29 ENCOUNTER — TELEPHONE (OUTPATIENT)
Age: 54
End: 2024-04-29

## 2024-04-29 DIAGNOSIS — G43.119 INTRACTABLE MIGRAINE WITH AURA WITHOUT STATUS MIGRAINOSUS: Primary | ICD-10-CM

## 2024-04-29 RX ORDER — BUTALBITAL, ACETAMINOPHEN AND CAFFEINE 300; 40; 50 MG/1; MG/1; MG/1
CAPSULE ORAL
Qty: 15 CAPSULE | Refills: 0 | Status: CANCELLED | OUTPATIENT
Start: 2024-04-29

## 2024-04-29 NOTE — TELEPHONE ENCOUNTER
Per Dr. Nguyen: She may not use any pain pills at all as she is high risk to get medication-overuse headache.  Based on the ED visit, she recently has bacterial bronchitis.  Migraine can worse with infection or getting illness.      LM for pt letting her know Dr. Nguyen's response.

## 2024-04-29 NOTE — TELEPHONE ENCOUNTER
Pt called today requesting refill of Fioricet.  Rx was sent to her pharmacy on 4/12 for qty of 15 tabs but pt has been having to use it frequently due to daily migraines & only has 1 pill left.  Wants refill of Fioricet sent to [facility] in [place] or is willing to try Nurtec again since the samples she received last year did help.    Also takes carbamazepine and verapamil daily for migraines and Botox every 90 days.    Next Botox inj sched 6/17.

## 2024-04-30 RX ORDER — RIMEGEPANT SULFATE 75 MG/75MG
75 TABLET, ORALLY DISINTEGRATING ORAL PRN
Qty: 4 TABLET | Refills: 0 | Status: SHIPPED | COMMUNITY
Start: 2024-04-30

## 2024-06-17 ENCOUNTER — OFFICE VISIT (OUTPATIENT)
Age: 54
End: 2024-06-17
Payer: COMMERCIAL

## 2024-06-17 VITALS
HEIGHT: 65 IN | RESPIRATION RATE: 12 BRPM | WEIGHT: 127 LBS | DIASTOLIC BLOOD PRESSURE: 74 MMHG | BODY MASS INDEX: 21.16 KG/M2 | SYSTOLIC BLOOD PRESSURE: 122 MMHG | HEART RATE: 68 BPM | OXYGEN SATURATION: 96 %

## 2024-06-17 DIAGNOSIS — G43.119 INTRACTABLE MIGRAINE WITH AURA WITHOUT STATUS MIGRAINOSUS: Primary | ICD-10-CM

## 2024-06-17 PROCEDURE — 64615 CHEMODENERV MUSC MIGRAINE: CPT | Performed by: PSYCHIATRY & NEUROLOGY

## 2024-06-17 RX ORDER — BUTALBITAL, ACETAMINOPHEN AND CAFFEINE 300; 40; 50 MG/1; MG/1; MG/1
CAPSULE ORAL
Qty: 15 CAPSULE | Refills: 1 | Status: SHIPPED | OUTPATIENT
Start: 2024-06-17

## 2024-06-17 RX ORDER — CARBAMAZEPINE 100 MG/1
100 TABLET, EXTENDED RELEASE ORAL NIGHTLY
Qty: 90 TABLET | Refills: 1 | Status: SHIPPED | OUTPATIENT
Start: 2024-06-17

## 2024-06-17 RX ORDER — VERAPAMIL HYDROCHLORIDE 120 MG/1
120 TABLET, FILM COATED ORAL NIGHTLY
Qty: 90 TABLET | Refills: 1 | Status: SHIPPED | OUTPATIENT
Start: 2024-06-17

## 2024-06-17 RX ORDER — DULOXETIN HYDROCHLORIDE 60 MG/1
60 CAPSULE, DELAYED RELEASE ORAL DAILY
Qty: 90 CAPSULE | Refills: 1 | Status: SHIPPED | OUTPATIENT
Start: 2024-06-17

## 2024-06-20 ENCOUNTER — TELEPHONE (OUTPATIENT)
Age: 54
End: 2024-06-20

## 2024-06-20 DIAGNOSIS — M79.7 FIBROMYALGIA: Primary | ICD-10-CM

## 2024-06-20 RX ORDER — PREGABALIN 75 MG/1
75 CAPSULE ORAL 2 TIMES DAILY
Qty: 60 CAPSULE | Refills: 2 | Status: SHIPPED | OUTPATIENT
Start: 2024-06-20 | End: 2024-09-18

## 2024-06-20 NOTE — TELEPHONE ENCOUNTER
Called patient informed her that lyrica was sent to pharmacy. Patient states she has taken lyrica in the past and it did not work. She was prescribed up to 400mg of lyrica a day and it wasn't helping. She is asking for something else to be sent in not lyrica

## 2024-06-20 NOTE — TELEPHONE ENCOUNTER
Pt called and is having a flair up of her fibromyalgia, and would like to know if Dr. Nguyen has prescribed medication for that diagnosis. If not, she took Lyrica in the past that worked and would like something called into her pharmacy, [facility].

## 2024-06-21 NOTE — TELEPHONE ENCOUNTER
The medication is APPROVED 3/23/2024-6/21/2025.    If this requires a response please respond to the pool ( P [facility] MEDICATION PRE-AUTH).      Thank you please advise patient.

## 2024-06-21 NOTE — TELEPHONE ENCOUNTER
Submitted PA for [facility] Key: [id number] STATUS: PENDING.    Follow up done daily; if no decision with in three days we will refax.  If another three days goes by with no decision will call the insurance for status.

## 2024-07-17 ENCOUNTER — TELEPHONE (OUTPATIENT)
Dept: NEUROLOGY | Age: 54
End: 2024-07-17

## 2024-07-17 NOTE — TELEPHONE ENCOUNTER
[facility] called to schedule delivery of Botox.     Please call to set up delivery

## 2024-08-09 NOTE — TELEPHONE ENCOUNTER
Office hasn't received any zahraa from [facility]otox [facility]. Logged online to botox portal received a message stating office will have to reach out. Called [facility] to get clarification on BV spoke with Carmen Willard whom states they are having a difficult time reaching out to pt insurance [facility]) states after several attempts the line keeps getting disconnected. They will continue to reach out. I did verify they have a good contact number for insurance.  Will continue to follow amphetamine-dextroamphetamine (Adderall) 30 MG tablet     Prescribed: 7/8/2024  Dispensed: 7/8/2024  Sold: 7/9/2024     Refill sent to [facility] on 7/29/2024 and not picked up    Patient requesting to be sent to [facility] as listed

## 2024-09-16 DIAGNOSIS — G43.119 INTRACTABLE MIGRAINE WITH AURA WITHOUT STATUS MIGRAINOSUS: Primary | ICD-10-CM

## 2024-09-19 ENCOUNTER — OFFICE VISIT (OUTPATIENT)
Age: 54
End: 2024-09-19

## 2024-09-19 VITALS
SYSTOLIC BLOOD PRESSURE: 118 MMHG | HEART RATE: 63 BPM | HEIGHT: 65 IN | BODY MASS INDEX: 21.66 KG/M2 | RESPIRATION RATE: 16 BRPM | OXYGEN SATURATION: 98 % | DIASTOLIC BLOOD PRESSURE: 66 MMHG | WEIGHT: 130 LBS

## 2024-09-19 DIAGNOSIS — M79.7 FIBROMYALGIA: ICD-10-CM

## 2024-09-19 DIAGNOSIS — G43.119 INTRACTABLE MIGRAINE WITH AURA WITHOUT STATUS MIGRAINOSUS: Primary | ICD-10-CM

## 2024-09-19 RX ORDER — BUTALBITAL, ACETAMINOPHEN AND CAFFEINE 300; 40; 50 MG/1; MG/1; MG/1
CAPSULE ORAL
Qty: 15 CAPSULE | Refills: 2 | OUTPATIENT
Start: 2024-09-19

## 2024-09-19 RX ORDER — VERAPAMIL HYDROCHLORIDE 120 MG/1
120 TABLET ORAL NIGHTLY
Qty: 90 TABLET | Refills: 1 | Status: SHIPPED | OUTPATIENT
Start: 2024-09-19

## 2024-09-19 RX ORDER — PREGABALIN 75 MG/1
75 CAPSULE ORAL 2 TIMES DAILY
Qty: 60 CAPSULE | Refills: 2 | Status: SHIPPED | OUTPATIENT
Start: 2024-09-19 | End: 2024-12-18

## 2024-09-19 RX ORDER — BUTALBITAL, ACETAMINOPHEN AND CAFFEINE 300; 40; 50 MG/1; MG/1; MG/1
CAPSULE ORAL
Qty: 15 CAPSULE | Refills: 1 | Status: SHIPPED | OUTPATIENT
Start: 2024-09-19

## 2024-09-19 RX ORDER — DULOXETIN HYDROCHLORIDE 60 MG/1
60 CAPSULE, DELAYED RELEASE ORAL DAILY
Qty: 90 CAPSULE | Refills: 1 | Status: SHIPPED | OUTPATIENT
Start: 2024-09-19

## 2024-10-10 ENCOUNTER — TELEPHONE (OUTPATIENT)
Dept: TELEMETRY | Age: 54
End: 2024-10-10

## 2024-10-10 DIAGNOSIS — Z72.0 TOBACCO USE: Primary | ICD-10-CM

## 2024-10-10 NOTE — TELEPHONE ENCOUNTER
Patient due for annual CT Lung Screening. Reminder letter mailed.    CT Lung Screening order has been pended to chart should you choose to sign it.  Routed to PCP    Fatou Charles RN

## 2024-10-24 ENCOUNTER — TELEPHONE (OUTPATIENT)
Dept: ADMINISTRATIVE | Age: 54
End: 2024-10-24

## 2024-10-24 NOTE — TELEPHONE ENCOUNTER
Submitted PA for Butalbital-APAP-Caffeine Via [facility] Key: [id number] STATUS: PENDING.    Follow up done daily; if no decision with in three days we will refax.  If another three days goes by with no decision will call the insurance for status.

## 2024-10-24 NOTE — TELEPHONE ENCOUNTER
The medication is APPROVED.  Auth Expiration Date: 10/24/2025.     If this requires a response please respond to the pool ( P MHCX PSC MEDICATION PRE-AUTH).      Thank you please advise patient.

## 2024-11-01 DIAGNOSIS — G43.119 INTRACTABLE MIGRAINE WITH AURA WITHOUT STATUS MIGRAINOSUS: ICD-10-CM

## 2024-11-01 RX ORDER — ONABOTULINUMTOXINA 200 [USP'U]/1
INJECTION, POWDER, LYOPHILIZED, FOR SOLUTION INTRADERMAL; INTRAMUSCULAR
Qty: 1 EACH | Refills: 3 | Status: SHIPPED | OUTPATIENT
Start: 2024-11-01

## 2024-11-04 ENCOUNTER — TELEPHONE (OUTPATIENT)
Age: 54
End: 2024-11-04

## 2024-11-04 NOTE — TELEPHONE ENCOUNTER
Received botox re-auth from [facility]. Auth  10/31. Will submit via CMM they have created PA with key [id number]

## 2024-11-05 NOTE — TELEPHONE ENCOUNTER
Received fax response from [facility] saying Botox has been approved until 11/5/2025.  Correspondence scanned to media.

## 2024-11-30 DIAGNOSIS — G43.119 INTRACTABLE MIGRAINE WITH AURA WITHOUT STATUS MIGRAINOSUS: ICD-10-CM

## 2024-11-30 DIAGNOSIS — M79.7 FIBROMYALGIA: ICD-10-CM

## 2024-12-02 RX ORDER — BUTALBITAL, ACETAMINOPHEN AND CAFFEINE 300; 40; 50 MG/1; MG/1; MG/1
CAPSULE ORAL
Qty: 15 CAPSULE | Refills: 2 | Status: SHIPPED | OUTPATIENT
Start: 2024-12-02

## 2024-12-12 NOTE — CARE COORDINATION
Case Management Assessment           Initial Evaluation                Date / Time of Evaluation: 9/1/2022 2:49 PM                 Assessment Completed by: Kodi Will RN    Patient Name: Juan Jerry     YOB: 1970  Diagnosis: Right leg weakness [R29.898]     Date / Time: 9/1/2022  4:51 AM    Patient Admission Status: Inpatient    If patient is discharged prior to next notation, then this note serves as note for discharge by case management. Current PCP: Alanis Schroeder MD  Clinic Patient: No    Chart Reviewed: Yes  Patient/ Family Interviewed: Yes    Initial assessment completed at bedside with: patient    Hospitalization in the last 30 days: No    Emergency Contacts:  Extended Emergency Contact Information  Primary Emergency Contact: Bon Glover  Address: [address], [facility] Phone: [phone]  Mobile Phone: [phone]  Relation: Other    Advance Directives:   Code Status: Full Code        Financial  Payor: [facility] MEDICARE / Plan: Flores Chen COMPLETE / Product Type: *No Product type* /     Pre-cert required for SNF: Yes    Pharmacy    [id number] [place], [place] - [address] [phone] - F [phone]  [address] [address]  Phone: [phone] Fax: [phone]      Potential assistance Purchasing Medications: Potential Assistance Purchasing Medications: No  Does Patient want to participate in local refill/ meds to beds program?:      Meds To Beds General Rules:  1. Can ONLY be done Monday- Friday between 8:30am-5pm  2. Prescription(s) must be in pharmacy by 3pm to be filled same day  3. Copy of patient's insurance/ prescription drug card and patient face sheet must be sent along with the prescription(s)  4. Cost of Rx cannot be added to hospital bill. If financial assistance is needed, please contact unit  or ;   or  Kiel Montano provide pharmacy voucher for patients co-pays  5. Patients can then  the prescription on their way out of the hospital at discharge, or pharmacy can deliver to the bedside if staff is available. (payment due at time of pick-up or delivery - cash, check, or card accepted)     Able to afford home medications/ co-pay costs: Yes    ADLS  Support Systems: Family Members, Parent, Spouse/Significant Other    PT AM-PAC:   /24  OT AM-PAC:   /24    [place]: home with spouse  Steps:     Plans to RETURN to current housing: Yes  Barriers to RETURNING to current housing: none    Home Care Information  Currently ACTIVE with [address]: No  Home Care Agency: Not Applicable        Durable Medical Equipment  DME Provider: n/a  Equipment: n/a    Home Oxygen and Respiratory Equipment  Has HOME OXYGEN prior to admission: No  [address]: Not Applicable  Other Respiratory Equipment:         Dialysis  Active with HD/PD prior to admission: No  Nephrologist:     HD Center:  Not Applicable    DISCHARGE PLAN:  Disposition: Home- No Services Needed    Transportation PLAN for discharge: family     Factors facilitating achievement of predicted outcomes: Family support, Cooperative, and Pleasant    Barriers to discharge: Medical complications    Additional Case Management Notes: Patient is from home with spouse, independent pta. No CM needs at this time. Family to transport home. The Plan for Transition of Care is related to the following treatment goals of Right leg weakness [R29.898]    The Patient and/or patient representative Faby Luciano and her family were provided with a choice of provider and agrees with the discharge plan Yes    Freedom of choice list was provided with basic dialogue that supports the patient's individualized plan of care/goals and shares the quality data associated with the providers.  Yes    Care Transition patient: No    Grant Green RN  [facility] [place] [employer]  Case Management Department  Ph: [phone]   Fax: [phone] Detail Level: Detailed Biopsy Photograph Reviewed: Yes Number Of Curettages: 3 Size Of Lesion In Cm: 0.5 Size Of Lesion After Curettage: 1.2 Add Intralesional Injection: No Concentration (Mg/Ml Or Millions Of Plaque Forming Units/Cc): 0.01 Total Volume (Ccs): 1 Anesthesia Type: 1% lidocaine with epinephrine Cautery Type: electrodesiccation What Was Performed First?: Curettage Final Size Statement: The size of the lesion after curettage was Additional Information: (Optional): The wound was cleaned, and a pressure dressing was applied.  The patient received detailed post-op instructions. Consent was obtained from the patient. The risks, benefits and alternatives to therapy were discussed in detail. Specifically, the risks of infection, scarring, bleeding, prolonged wound healing, nerve injury, incomplete removal, allergy to anesthesia and recurrence were addressed. Alternatives to ED&C, such as: surgical removal and XRT were also discussed.  Prior to the procedure, the treatment site was clearly identified and confirmed by the patient. All components of Universal Protocol/PAUSE Rule completed. Post-Care Instructions: I reviewed with the patient in detail post-care instructions. Patient is to keep the area dry for 48 hours, and not to engage in any swimming until the area is healed. Should the patient develop any fevers, chills, bleeding, severe pain patient will contact the office immediately. Bill As A Line Item Or As Units: Line Item

## 2024-12-13 ENCOUNTER — TELEPHONE (OUTPATIENT)
Age: 54
End: 2024-12-13

## 2024-12-13 NOTE — TELEPHONE ENCOUNTER
Patient cancelled appointment on 12/16/24 with Dr Nguyen for botox.    Reason: office does not have medication; I informed pt to reach out to [facility] to give consent to ship med. Once med is received by office can work pt in for injection.

## 2024-12-13 NOTE — TELEPHONE ENCOUNTER
Spoke with [facility] - they don't deliver Botox on Mondays so delivery is scheduled for Tues 12/17.  Will watch for cancellations 12/18 and 12/19.  LM for pt with that info.

## 2024-12-13 NOTE — TELEPHONE ENCOUNTER
Pt called back , pt spoke to [facility], they are waiting on this office to confirm delivery date.   Pt ask this office to call [facility] and confirm delivery date.

## 2024-12-18 ENCOUNTER — OFFICE VISIT (OUTPATIENT)
Age: 54
End: 2024-12-18
Payer: COMMERCIAL

## 2024-12-18 VITALS
HEART RATE: 65 BPM | HEIGHT: 65 IN | BODY MASS INDEX: 21.66 KG/M2 | DIASTOLIC BLOOD PRESSURE: 70 MMHG | SYSTOLIC BLOOD PRESSURE: 108 MMHG | OXYGEN SATURATION: 97 % | WEIGHT: 130 LBS

## 2024-12-18 DIAGNOSIS — G43.119 INTRACTABLE MIGRAINE WITH AURA WITHOUT STATUS MIGRAINOSUS: ICD-10-CM

## 2024-12-18 PROCEDURE — 64615 CHEMODENERV MUSC MIGRAINE: CPT | Performed by: PSYCHIATRY & NEUROLOGY

## 2024-12-18 RX ORDER — VERAPAMIL HYDROCHLORIDE 120 MG/1
120 TABLET ORAL NIGHTLY
Qty: 90 TABLET | Refills: 1 | Status: SHIPPED | OUTPATIENT
Start: 2024-12-18

## 2024-12-18 NOTE — PATIENT INSTRUCTIONS
YOU MUST CONFIRM YOUR APPOINTMENT 1 DAY PRIOR OR IT WILL BE CANCELLED!!   Our office will call you 3 times the day prior to your appointment in an attempt to confirm.  Please return our call ASAP or confirm your appt through [employer] no later than 3 pm the day before your appointment.  If we do not hear back from you by 3 pm to confirm, your appointment will be cancelled & someone will be added into that slot from our wait list.

## 2024-12-18 NOTE — PROGRESS NOTES
Botox for migraine    Indication:    Chronic refractory migraine headache.    Technique:    31 sites are injected with 5 unites each. Total dose of 155 units each session.  Injections should be done at least 12 weeks after the prior injection to avoid antibody formation.    Preparation:    While the patient is setting in a chair, examine the patient to identify the landmarks (listed below).  Withdraw 4 ml of normal saline and smoothly inject it into the Botox vial.  Using a 1.5\" 25G needle, withdraw the reconstituted Botox into a 1ml syringe then switch the needle to 30G 0.5\". This way, each 10 units of the syringe (0.1ml) will contain 5 units of Botox.  Adjust the needle so the bevel faces the same direction as the syringe marks.    Injection:    Make sure the bevel is facing up, aspirate before injection, try to inject in the superficial layer of the muscle (once you feel the loss of resistance, when needle cross the dermis).                   The patient agrees to discontinue pregabalin and duloxetine at this time.

## 2024-12-31 ENCOUNTER — HOSPITAL ENCOUNTER (OUTPATIENT)
Dept: CT IMAGING | Age: 54
Discharge: HOME | End: 2024-12-31
Attending: STUDENT IN AN ORGANIZED HEALTH CARE EDUCATION/TRAINING PROGRAM
Payer: COMMERCIAL

## 2024-12-31 DIAGNOSIS — Z72.0 TOBACCO USE: ICD-10-CM

## 2024-12-31 PROCEDURE — 71271 CT THORAX LUNG CANCER SCR C-: CPT

## 2025-01-03 DIAGNOSIS — J43.9 PULMONARY EMPHYSEMA, UNSPECIFIED EMPHYSEMA TYPE (HCC): Primary | ICD-10-CM

## 2025-01-03 RX ORDER — ALBUTEROL SULFATE 90 UG/1
2 INHALANT RESPIRATORY (INHALATION) EVERY 4 HOURS PRN
Qty: 18 G | Refills: 0 | Status: SHIPPED | OUTPATIENT
Start: 2025-01-03 | End: 2025-01-14

## 2025-02-11 RX ORDER — DULOXETIN HYDROCHLORIDE 60 MG/1
60 CAPSULE, DELAYED RELEASE ORAL DAILY
Qty: 90 CAPSULE | Refills: 0 | OUTPATIENT
Start: 2025-02-11

## 2025-02-13 ENCOUNTER — APPOINTMENT (OUTPATIENT)
Dept: CT IMAGING | Age: 55
End: 2025-02-13
Payer: COMMERCIAL

## 2025-02-13 ENCOUNTER — HOSPITAL ENCOUNTER (EMERGENCY)
Age: 55
Discharge: HOME | End: 2025-02-14
Attending: EMERGENCY MEDICINE
Payer: COMMERCIAL

## 2025-02-13 DIAGNOSIS — M54.50 ACUTE EXACERBATION OF CHRONIC LOW BACK PAIN: Primary | ICD-10-CM

## 2025-02-13 DIAGNOSIS — G89.29 ACUTE EXACERBATION OF CHRONIC LOW BACK PAIN: Primary | ICD-10-CM

## 2025-02-13 LAB
BILIRUB UR QL STRIP.AUTO: NEGATIVE
CLARITY UR: CLEAR
COLOR UR: YELLOW
GLUCOSE UR STRIP.AUTO-MCNC: NEGATIVE MG/DL
HGB UR QL STRIP.AUTO: NEGATIVE
KETONES UR STRIP.AUTO-MCNC: NEGATIVE MG/DL
LEUKOCYTE ESTERASE UR QL STRIP.AUTO: NEGATIVE
NITRITE UR QL STRIP.AUTO: NEGATIVE
PH UR STRIP.AUTO: 6 [PH] (ref 5–8)
PROT UR STRIP.AUTO-MCNC: NEGATIVE MG/DL
SP GR UR STRIP.AUTO: 1.01 (ref 1–1.03)
UA COMPLETE W REFLEX CULTURE PNL UR: NORMAL
UA DIPSTICK W REFLEX MICRO PNL UR: NORMAL
URN SPEC COLLECT METH UR: NORMAL
UROBILINOGEN UR STRIP-ACNC: 0.2 E.U./DL

## 2025-02-13 PROCEDURE — 6370000000 HC RX 637 (ALT 250 FOR IP): Performed by: EMERGENCY MEDICINE

## 2025-02-13 PROCEDURE — 81003 URINALYSIS AUTO W/O SCOPE: CPT

## 2025-02-13 PROCEDURE — 99284 EMERGENCY DEPT VISIT MOD MDM: CPT

## 2025-02-13 PROCEDURE — 72131 CT LUMBAR SPINE W/O DYE: CPT

## 2025-02-13 RX ORDER — OXYCODONE AND ACETAMINOPHEN 5; 325 MG/1; MG/1
1 TABLET ORAL ONCE
Status: COMPLETED | OUTPATIENT
Start: 2025-02-13 | End: 2025-02-13

## 2025-02-13 RX ORDER — DIAZEPAM 5 MG/1
5 TABLET ORAL ONCE
Status: COMPLETED | OUTPATIENT
Start: 2025-02-13 | End: 2025-02-13

## 2025-02-13 RX ADMIN — DIAZEPAM 5 MG: 5 TABLET ORAL at 22:44

## 2025-02-13 RX ADMIN — OXYCODONE HYDROCHLORIDE AND ACETAMINOPHEN 1 TABLET: 5; 325 TABLET ORAL at 22:44

## 2025-02-13 ASSESSMENT — PAIN DESCRIPTION - LOCATION: LOCATION: BACK

## 2025-02-13 ASSESSMENT — PAIN SCALES - GENERAL
PAINLEVEL_OUTOF10: 9
PAINLEVEL_OUTOF10: 6

## 2025-02-13 ASSESSMENT — PAIN DESCRIPTION - DESCRIPTORS: DESCRIPTORS: SHARP;SHOOTING

## 2025-02-13 ASSESSMENT — PAIN - FUNCTIONAL ASSESSMENT: PAIN_FUNCTIONAL_ASSESSMENT: 0-10

## 2025-02-13 ASSESSMENT — PAIN DESCRIPTION - ORIENTATION: ORIENTATION: LEFT

## 2025-02-14 VITALS
RESPIRATION RATE: 20 BRPM | TEMPERATURE: 98 F | HEART RATE: 80 BPM | SYSTOLIC BLOOD PRESSURE: 122 MMHG | DIASTOLIC BLOOD PRESSURE: 80 MMHG | OXYGEN SATURATION: 100 %

## 2025-02-14 RX ORDER — OXYCODONE AND ACETAMINOPHEN 5; 325 MG/1; MG/1
1 TABLET ORAL EVERY 4 HOURS PRN
Qty: 8 TABLET | Refills: 0 | Status: SHIPPED | OUTPATIENT
Start: 2025-02-14 | End: 2025-02-16

## 2025-02-14 RX ORDER — METHYLPREDNISOLONE 4 MG/1
TABLET ORAL
Qty: 1 KIT | Refills: 0 | Status: SHIPPED | OUTPATIENT
Start: 2025-02-14 | End: 2025-02-20

## 2025-02-14 RX ORDER — CYCLOBENZAPRINE HCL 10 MG
10 TABLET ORAL 3 TIMES DAILY PRN
Qty: 15 TABLET | Refills: 0 | Status: SHIPPED | OUTPATIENT
Start: 2025-02-14 | End: 2025-02-19

## 2025-02-14 ASSESSMENT — PAIN DESCRIPTION - ORIENTATION: ORIENTATION: LEFT

## 2025-02-14 ASSESSMENT — PAIN DESCRIPTION - PAIN TYPE: TYPE: ACUTE PAIN

## 2025-02-14 ASSESSMENT — PAIN SCALES - GENERAL: PAINLEVEL_OUTOF10: 6

## 2025-02-14 ASSESSMENT — PAIN DESCRIPTION - LOCATION: LOCATION: BACK;HIP

## 2025-02-14 ASSESSMENT — PAIN DESCRIPTION - DESCRIPTORS: DESCRIPTORS: SHOOTING

## 2025-02-14 NOTE — ED PROVIDER NOTES
[facility] EMERGENCY DEPARTMENT    CHIEF COMPLAINT  Back Pain (X 2 weeks. Has been taking her Lyrica with no relief)       HISTORY OF PRESENT ILLNESS  Teodora Glover is a 54 y.o. female who presents to the ED presents for evaluation of low back pain.  Patient states that she has a history of having problems with her back but has noticed over the past 2 weeks that the pain is gotten worse.  According to patient and her  she is having a hard time walking because of the discomfort.  She states that the pain appears to be radiating down her left leg.  She denies any bowel or bladder incontinence denies any complaints of any dysuria or hematuria also denies any associated numbness.  Pain is described as moderate to severe aggravated with any type of movement patient states that there have been no relieving factors      Review of systems all systems reviewed are negative except per HPI    I have reviewed the following from the nursing documentation:    Past Medical History:   Diagnosis Date    Alcohol abuse 2014    DDD (degenerative disc disease), lumbosacral     Depression     Gastritis     Heart murmur     Heart palpitations     Migraine     Osteoarthritis     Overactive bladder     SCIATIC NERVE DISEASE     Subarachnoid hemorrhage (HCC) 2007     Past Surgical History:   Procedure Laterality Date    BRAIN SURGERY      PT STATED DRILLED HOLE TO RELIEVE BLOOD FROM HEMORRHAGE    CHOLECYSTECTOMY  01/15/2018     LAPAROSCOPIC CHOLECYSTECTOMY              ENDOMETRIAL ABLATION      TONSILLECTOMY      TUBAL LIGATION       Family History   Problem Relation Age of Onset    Arthritis Mother     Asthma Mother     Depression Mother     Diabetes Mother     Hearing Loss Mother     Heart Disease Mother     High Blood Pressure Mother     High Cholesterol Mother     Mental Illness Mother     Stroke Mother     Vision Loss Mother     Arthritis Father     Hearing Loss Father     High Blood Pressure Father     High  02/14/2025 12:14:56 AM     Suzette Garrison MD    Note: This chart was created using voice recognition dictation software. Efforts were made by me to ensure accuracy, however some errors may be present due to limitations of this technology and occasionally words are not transcribed correctly.       Suzette Garrison MD  02/14/25 0946

## 2025-02-18 DIAGNOSIS — M79.7 FIBROMYALGIA: ICD-10-CM

## 2025-02-18 DIAGNOSIS — G43.119 INTRACTABLE MIGRAINE WITH AURA WITHOUT STATUS MIGRAINOSUS: ICD-10-CM

## 2025-02-18 NOTE — TELEPHONE ENCOUNTER
LOV: 12/18/24  Next appt: 3/12/25 (this appt will need to be r/s)  Last refill: 12/2/24 15 caps 2 refills.

## 2025-02-20 RX ORDER — BUTALBITAL, ACETAMINOPHEN AND CAFFEINE 300; 40; 50 MG/1; MG/1; MG/1
CAPSULE ORAL
Qty: 15 CAPSULE | Refills: 4 | Status: SHIPPED | OUTPATIENT
Start: 2025-02-20

## 2025-03-05 ENCOUNTER — TELEPHONE (OUTPATIENT)
Age: 55
End: 2025-03-05

## 2025-03-05 NOTE — TELEPHONE ENCOUNTER
Ana @ [facility] called to schedule delivery of Pt's Botox    CB: [phone]

## 2025-03-12 ENCOUNTER — OFFICE VISIT (OUTPATIENT)
Dept: NEUROLOGY | Age: 55
End: 2025-03-12
Payer: COMMERCIAL

## 2025-03-12 VITALS
DIASTOLIC BLOOD PRESSURE: 73 MMHG | OXYGEN SATURATION: 96 % | BODY MASS INDEX: 19.99 KG/M2 | SYSTOLIC BLOOD PRESSURE: 125 MMHG | WEIGHT: 120 LBS | HEIGHT: 65 IN | HEART RATE: 74 BPM

## 2025-03-12 DIAGNOSIS — G43.E19 INTRACTABLE CHRONIC MIGRAINE WITH AURA AND WITHOUT STATUS MIGRAINOSUS: Primary | ICD-10-CM

## 2025-03-12 DIAGNOSIS — M79.7 FIBROMYALGIA: ICD-10-CM

## 2025-03-12 PROCEDURE — 64615 CHEMODENERV MUSC MIGRAINE: CPT | Performed by: STUDENT IN AN ORGANIZED HEALTH CARE EDUCATION/TRAINING PROGRAM

## 2025-03-12 PROCEDURE — 99214 OFFICE O/P EST MOD 30 MIN: CPT | Performed by: STUDENT IN AN ORGANIZED HEALTH CARE EDUCATION/TRAINING PROGRAM

## 2025-03-12 RX ORDER — UBROGEPANT 100 MG/1
100 TABLET ORAL PRN
Qty: 16 TABLET | Refills: 11 | Status: SHIPPED | OUTPATIENT
Start: 2025-03-12

## 2025-03-12 RX ORDER — VERAPAMIL HYDROCHLORIDE 120 MG/1
120 TABLET ORAL NIGHTLY
Qty: 90 TABLET | Refills: 1 | Status: SHIPPED | OUTPATIENT
Start: 2025-03-12

## 2025-03-12 RX ORDER — ERENUMAB-AOOE 70 MG/ML
70 INJECTION SUBCUTANEOUS
Qty: 1 ADJUSTABLE DOSE PRE-FILLED PEN SYRINGE | Refills: 11 | Status: SHIPPED | OUTPATIENT
Start: 2025-03-12 | End: 2026-03-12

## 2025-03-12 RX ORDER — BUTALBITAL, ACETAMINOPHEN AND CAFFEINE 300; 40; 50 MG/1; MG/1; MG/1
1 CAPSULE ORAL EVERY 6 HOURS PRN
Qty: 9 CAPSULE | Refills: 5 | Status: SHIPPED | OUTPATIENT
Start: 2025-03-12 | End: 2025-09-08

## 2025-03-12 NOTE — PROGRESS NOTES
Teodora Glover (:  1970) is a 54 y.o. female,Established patient, here for evaluation of the following chief complaint(s):  Follow-up (Dr Nguyen patient  Botox)      Assessment & Plan   1. Intractable chronic migraine with aura and without status migrainosus  Assessment & Plan:  Her migraines are controlled.  Headache significant improved with Botox injections but she continues to have frequent migrainous headaches.  She has difficulty quantifying her headache frequency.  I am starting Aimovig 70 mg daily for prophylaxis.  She received her typical Botox dose and injection pattern today.  She reports some difficulties with ptosis and mild neck weakness that is not present on exam but may be present at peak this time so I will not increase the dose of Botox.  She is also prescribed verapamil for chest pain that is most likely due to fibromyalgia and this does not appear to improve her headaches.  I am starting Ubrelvy to use as an alternative to combination butalbital, acetaminophen, and caffeine for rescue therapy to prevent medication overuse headache.  She has a contraindication to triptans namely history of subarachnoid hemorrhage.  Orders:  -     verapamil (CALAN) 120 MG tablet; Take 1 tablet by mouth nightly, Disp-90 tablet, R-1Normal  -     Erenumab-aooe (AIMOVIG) 70 MG/ML SOAJ; Inject 70 mg into the skin every 30 days, Disp-1 Adjustable Dose Pre-filled Pen Syringe, R-11Normal  -     Ubrogepant (UBRELVY) 100 MG TABS; Take 100 mg by mouth as needed (migraine) If symptoms persist or return, may repeat dose after 2 hours. Maximum: 100mg per dose; 200 mg per 24 hours., Disp-16 tablet, R-11Normal  -     butalbital-APAP-caffeine -40 MG CAPS per capsule; Take 1 capsule by mouth every 6 hours as needed for Headaches TAKE 1 CAPSULE BY MOUTH EVERY 6 HOURS AS NEEDED FOR HEADACHE Max Daily Amount: 4 capsules, Disp-9 capsule, R-5Normal  -     Onabotulinumtoxin A (BOTOX) injection 200 Units; 200 Units,

## 2025-03-12 NOTE — PROGRESS NOTES
Botox Injection    Teodora Glover came today for Botox injection.  The patient is benefiting from treatment, with greater than 50% reduction in headache frequency. I reviewed with her the risks and benefits including ptosis, infection, and bleeding. Patient has no contraindications.    Consent was reviewed and signed.     Skin cleaned with isopropyl alcohol. 155 units Botox A injected per FDA approved protocol in divided locations.   procerus muscle 5 units    muscle 5 units left, 5 units right   frontalis muscle 10 units left, 10 units right   temporalis muscle 20 units left, 20 units right   occipitalis muscle 15 units left, 15 units right  upper cervical paraspinal muscle 10 units left, 10 units right   upper trapezius muscle 15 units left, 15 units right     Patient tolerated the procedure well.   No complications.     Patient understands the side effects and risks, as well as the necessity for continued treatment to maintain improvement.  See diagram for injection sites and dosages. 155 units were used at a concentration of 5 units per 0.1 mL. 45 units wasted as unavoidable waste.     I recommend continued treatment every 91 days for chronic intractable migraine.      Electronically signed by Clifford Walden MD on 3/12/2025 at 12:49 PM

## 2025-03-13 ENCOUNTER — TELEPHONE (OUTPATIENT)
Dept: ADMINISTRATIVE | Age: 55
End: 2025-03-13

## 2025-03-13 NOTE — TELEPHONE ENCOUNTER
Submitted PA for Ubrelvy Via [facility] Key: [id number] STATUS: PENDING.    Follow up done daily; if no decision with in three days we will refax.  If another three days goes by with no decision will call the insurance for status.

## 2025-03-13 NOTE — TELEPHONE ENCOUNTER
Submitted PA for Aimovig Via [facility] Key: [id number] STATUS: PENDING.    Follow up done daily; if no decision with in three days we will refax.  If another three days goes by with no decision will call the insurance for status.

## 2025-03-13 NOTE — TELEPHONE ENCOUNTER
The medication is APPROVED.    Authorization Expiration Date: 3/13/2026.    If this requires a response please respond to the pool ( P MHCX PSC MEDICATION PRE-AUTH).      Thank you please advise patient.

## 2025-03-13 NOTE — ASSESSMENT & PLAN NOTE
Her chest pain is now well-controlled located over the ribs bilateral.  I do not think this is a neurogenic pain.  However, verapamil was started previously for fibromyalgia and improved her pain transiently.  Continue the same dose of verapamil at this time.

## 2025-03-13 NOTE — ASSESSMENT & PLAN NOTE
Her migraines are controlled.  Headache significant improved with Botox injections but she continues to have frequent migrainous headaches.  She has difficulty quantifying her headache frequency.  I am starting Aimovig 70 mg daily for prophylaxis.  She received her typical Botox dose and injection pattern today.  She reports some difficulties with ptosis and mild neck weakness that is not present on exam but may be present at peak this time so I will not increase the dose of Botox.  She is also prescribed verapamil for chest pain that is most likely due to fibromyalgia and this does not appear to improve her headaches.  I am starting Ubrelvy to use as an alternative to combination butalbital, acetaminophen, and caffeine for rescue therapy to prevent medication overuse headache.  She has a contraindication to triptans namely history of subarachnoid hemorrhage.

## 2025-03-13 NOTE — TELEPHONE ENCOUNTER
The medication is APPROVED.    Authorization Expiration Date: 6/11/2025.    If this requires a response please respond to the pool ( P MHCX PSC MEDICATION PRE-AUTH).      Thank you please advise patient.

## 2025-03-24 ENCOUNTER — HOSPITAL ENCOUNTER (EMERGENCY)
Age: 55
Discharge: HOME | End: 2025-03-24
Attending: STUDENT IN AN ORGANIZED HEALTH CARE EDUCATION/TRAINING PROGRAM
Payer: COMMERCIAL

## 2025-03-24 VITALS
WEIGHT: 115.8 LBS | SYSTOLIC BLOOD PRESSURE: 157 MMHG | TEMPERATURE: 97.8 F | BODY MASS INDEX: 19.29 KG/M2 | HEART RATE: 74 BPM | DIASTOLIC BLOOD PRESSURE: 76 MMHG | OXYGEN SATURATION: 99 % | RESPIRATION RATE: 18 BRPM | HEIGHT: 65 IN

## 2025-03-24 DIAGNOSIS — G89.29 ACUTE EXACERBATION OF CHRONIC LOW BACK PAIN: Primary | ICD-10-CM

## 2025-03-24 DIAGNOSIS — M54.50 ACUTE EXACERBATION OF CHRONIC LOW BACK PAIN: Primary | ICD-10-CM

## 2025-03-24 DIAGNOSIS — M54.32 SCIATICA OF LEFT SIDE: ICD-10-CM

## 2025-03-24 LAB
BILIRUB UR QL STRIP.AUTO: NEGATIVE
CLARITY UR: CLEAR
COLOR UR: YELLOW
GLUCOSE UR STRIP.AUTO-MCNC: NEGATIVE MG/DL
HGB UR QL STRIP.AUTO: NEGATIVE
KETONES UR STRIP.AUTO-MCNC: NEGATIVE MG/DL
LEUKOCYTE ESTERASE UR QL STRIP.AUTO: NEGATIVE
NITRITE UR QL STRIP.AUTO: NEGATIVE
PH UR STRIP.AUTO: 6 [PH] (ref 5–8)
PROT UR STRIP.AUTO-MCNC: NEGATIVE MG/DL
SP GR UR STRIP.AUTO: <=1.005 (ref 1–1.03)
UA COMPLETE W REFLEX CULTURE PNL UR: NORMAL
UA DIPSTICK W REFLEX MICRO PNL UR: NORMAL
URN SPEC COLLECT METH UR: NORMAL
UROBILINOGEN UR STRIP-ACNC: 0.2 E.U./DL

## 2025-03-24 PROCEDURE — 6370000000 HC RX 637 (ALT 250 FOR IP): Performed by: STUDENT IN AN ORGANIZED HEALTH CARE EDUCATION/TRAINING PROGRAM

## 2025-03-24 PROCEDURE — 81003 URINALYSIS AUTO W/O SCOPE: CPT

## 2025-03-24 PROCEDURE — 99283 EMERGENCY DEPT VISIT LOW MDM: CPT

## 2025-03-24 PROCEDURE — 80307 DRUG TEST PRSMV CHEM ANLYZR: CPT

## 2025-03-24 RX ORDER — PREDNISONE 20 MG/1
40 TABLET ORAL ONCE
Status: COMPLETED | OUTPATIENT
Start: 2025-03-24 | End: 2025-03-24

## 2025-03-24 RX ORDER — OXYCODONE AND ACETAMINOPHEN 5; 325 MG/1; MG/1
1 TABLET ORAL ONCE
Refills: 0 | Status: COMPLETED | OUTPATIENT
Start: 2025-03-24 | End: 2025-03-24

## 2025-03-24 RX ORDER — OXYCODONE AND ACETAMINOPHEN 5; 325 MG/1; MG/1
1 TABLET ORAL EVERY 6 HOURS PRN
Qty: 6 TABLET | Refills: 0 | Status: SHIPPED | OUTPATIENT
Start: 2025-03-24 | End: 2025-03-27

## 2025-03-24 RX ORDER — DIAZEPAM 5 MG/1
5 TABLET ORAL ONCE
Status: COMPLETED | OUTPATIENT
Start: 2025-03-24 | End: 2025-03-24

## 2025-03-24 RX ORDER — CYCLOBENZAPRINE HCL 5 MG
5 TABLET ORAL 3 TIMES DAILY PRN
Qty: 15 TABLET | Refills: 0 | Status: SHIPPED | OUTPATIENT
Start: 2025-03-24 | End: 2025-04-03

## 2025-03-24 RX ORDER — PREDNISONE 20 MG/1
20 TABLET ORAL DAILY
Qty: 4 TABLET | Refills: 0 | Status: SHIPPED | OUTPATIENT
Start: 2025-03-24 | End: 2025-03-28

## 2025-03-24 RX ADMIN — DIAZEPAM 5 MG: 5 TABLET ORAL at 22:11

## 2025-03-24 RX ADMIN — OXYCODONE HYDROCHLORIDE AND ACETAMINOPHEN 1 TABLET: 5; 325 TABLET ORAL at 22:11

## 2025-03-24 RX ADMIN — PREDNISONE 40 MG: 20 TABLET ORAL at 22:11

## 2025-03-24 ASSESSMENT — LIFESTYLE VARIABLES
HOW OFTEN DO YOU HAVE A DRINK CONTAINING ALCOHOL: NEVER
HOW MANY STANDARD DRINKS CONTAINING ALCOHOL DO YOU HAVE ON A TYPICAL DAY: PATIENT DOES NOT DRINK

## 2025-03-24 ASSESSMENT — PAIN SCALES - GENERAL: PAINLEVEL_OUTOF10: 4

## 2025-03-25 LAB
AMPHETAMINES UR QL SCN>1000 NG/ML: ABNORMAL
BARBITURATES UR QL SCN>200 NG/ML: ABNORMAL
BENZODIAZ UR QL SCN>200 NG/ML: ABNORMAL
CANNABINOIDS UR QL SCN>50 NG/ML: ABNORMAL
COCAINE UR QL SCN: ABNORMAL
DRUG SCREEN COMMENT UR-IMP: ABNORMAL
FENTANYL SCREEN, URINE: ABNORMAL
METHADONE UR QL SCN>300 NG/ML: ABNORMAL
OPIATES UR QL SCN>300 NG/ML: ABNORMAL
OXYCODONE UR QL SCN: POSITIVE
PCP UR QL SCN>25 NG/ML: ABNORMAL
PH UR STRIP: 6 [PH]

## 2025-03-25 NOTE — DISCHARGE INSTRUCTIONS
Please return to the ER for any worsening symptoms or concerns.  Please avoid taking Percocet tablets prior to operating heavy machinery as it may cause drowsiness.  Please take prednisone with either food or milk.   Opt out

## 2025-03-25 NOTE — ED PROVIDER NOTES
(*)     All other components within normal limits   URINALYSIS WITH REFLEX TO CULTURE       When ordered only abnormal lab results are displayed. All other labs were within normal range or not returned as of this dictation.    EKG  The EKG, as interpreted by myself, in the emergency department in the absence of a cardiologist.    The Ekg interpreted by me shows  [] with a rate of []  Axis is []  QTc is []  Intervals and Durations are unremarkable.      ST Segments: []  No significant change from prior EKG dated []    RADIOLOGY:     Non-plain film images such as CT, Ultrasound and MRI are read by the radiologist. Plain radiographic images personally reviewed.     Interpretation per the Radiologist below, if available at the time of this note:  No orders to display     No results found.      Bedside Ultrasound, as interpreted by me, if performed:    No results found.    PROCEDURES     Unless otherwise noted below, none     Procedures    CRITICAL CARE TIME     I personally spent a total of 0 minutes of critical care time in obtaining history, performing a physical exam, bedside monitoring of interventions, collecting and interpreting tests and discussion with consultants but excluding time spent performing procedures, treating other patients and teaching time.                                                                                                         EMERGENCY DEPARTMENT COURSE and DIFFERENTIAL DIAGNOSIS/MDM:     Patient seen and evaluated. At presentation, patient was in moderate distress yet hemodynamically stable.  1 Percocet tab, prednisone 40 mg and Valium was provided.  She was recently seen in the ED on 1/25/2025, CT of the lumbar spine noncontrast was obtained and found to be negative for acute osseous abnormalities of the spine.  Patient denies any recent trauma and states that the pain she is experiencing is similar to sciatica-like pain that she is experienced in the past.  No red flag signs.  No

## 2025-03-26 ENCOUNTER — RESULTS FOLLOW-UP (OUTPATIENT)
Dept: EMERGENCY DEPT | Age: 55
End: 2025-03-26

## 2025-03-29 ENCOUNTER — HOSPITAL ENCOUNTER (EMERGENCY)
Age: 55
Discharge: HOME | End: 2025-03-30
Attending: STUDENT IN AN ORGANIZED HEALTH CARE EDUCATION/TRAINING PROGRAM
Payer: COMMERCIAL

## 2025-03-29 VITALS
HEIGHT: 65 IN | SYSTOLIC BLOOD PRESSURE: 140 MMHG | OXYGEN SATURATION: 97 % | TEMPERATURE: 97.7 F | RESPIRATION RATE: 16 BRPM | DIASTOLIC BLOOD PRESSURE: 84 MMHG | WEIGHT: 117 LBS | HEART RATE: 81 BPM | BODY MASS INDEX: 19.49 KG/M2

## 2025-03-29 DIAGNOSIS — M54.32 SCIATICA OF LEFT SIDE: Primary | ICD-10-CM

## 2025-03-29 PROCEDURE — 6360000002 HC RX W HCPCS: Performed by: STUDENT IN AN ORGANIZED HEALTH CARE EDUCATION/TRAINING PROGRAM

## 2025-03-29 PROCEDURE — 6370000000 HC RX 637 (ALT 250 FOR IP)

## 2025-03-29 PROCEDURE — 99284 EMERGENCY DEPT VISIT MOD MDM: CPT

## 2025-03-29 PROCEDURE — 96372 THER/PROPH/DIAG INJ SC/IM: CPT

## 2025-03-29 RX ORDER — LIDOCAINE 4 G/G
1 PATCH TOPICAL ONCE
Status: DISCONTINUED | OUTPATIENT
Start: 2025-03-29 | End: 2025-03-30 | Stop reason: HOSPADM

## 2025-03-29 RX ORDER — KETOROLAC TROMETHAMINE 30 MG/ML
15 INJECTION, SOLUTION INTRAMUSCULAR; INTRAVENOUS ONCE
Status: COMPLETED | OUTPATIENT
Start: 2025-03-29 | End: 2025-03-29

## 2025-03-29 RX ORDER — KETOROLAC TROMETHAMINE 30 MG/ML
15 INJECTION, SOLUTION INTRAMUSCULAR; INTRAVENOUS ONCE
Status: DISCONTINUED | OUTPATIENT
Start: 2025-03-29 | End: 2025-03-29

## 2025-03-29 RX ORDER — METHOCARBAMOL 500 MG/1
750 TABLET, FILM COATED ORAL ONCE
Status: COMPLETED | OUTPATIENT
Start: 2025-03-29 | End: 2025-03-29

## 2025-03-29 RX ORDER — ACETAMINOPHEN 325 MG/1
650 TABLET ORAL ONCE
Status: COMPLETED | OUTPATIENT
Start: 2025-03-29 | End: 2025-03-29

## 2025-03-29 RX ADMIN — METHOCARBAMOL 750 MG: 500 TABLET ORAL at 21:24

## 2025-03-29 RX ADMIN — KETOROLAC TROMETHAMINE 15 MG: 30 INJECTION, SOLUTION INTRAMUSCULAR at 21:22

## 2025-03-29 RX ADMIN — ACETAMINOPHEN 650 MG: 325 TABLET, FILM COATED ORAL at 21:23

## 2025-03-29 ASSESSMENT — PAIN - FUNCTIONAL ASSESSMENT: PAIN_FUNCTIONAL_ASSESSMENT: 0-10

## 2025-03-29 ASSESSMENT — PAIN DESCRIPTION - LOCATION: LOCATION: BACK;LEG

## 2025-03-29 ASSESSMENT — PAIN SCALES - GENERAL: PAINLEVEL_OUTOF10: 9

## 2025-03-30 NOTE — DISCHARGE INSTRUCTIONS
1. Today you were seen at the [facility] Emergency Department for sciatica related hip and leg pain.  We treated you with Toradol, Tylenol, muscle relaxer, and lidocaine patch with good relief of your symptoms.  We strongly recommended that you follow-up with a spine doctor so that they can evaluate you and get you in for physical therapy and possibly injections to help with your pain and more definitive care  2. Please return if you begin to experiencing worsening of your symptoms including: Inability to walk due to objective weakness, new numbness in the leg, inability to control your bowel or bladder, numbness in your genitals, fever of 100.4 °F or greater, or any other new concerns.  3. Please follow-up with your primary care doctor for further evaluation and to ensure resolution of your symptoms.

## 2025-03-30 NOTE — ED PROVIDER NOTES
[facility]  EMERGENCY DEPARTMENT ENCOUNTER          EM RESIDENT NOTE       Date of evaluation: 3/29/2025    Chief Complaint     Back Pain (Patient stated that she has had back and leg pain x 2 weeks, hx of sciatic problems, stated that she wants a medrol dose pack. )      History of Present Illness     Teodora Glover is a 55 y.o. female with PMHx chronic sciatica on both sides who presents with Back Pain (Patient stated that she has had back and leg pain x 2 weeks, hx of sciatic problems, stated that she wants a medrol dose pack. )  .  Patient reports that at the beginning of this month that she was lifting an empty toy box and she started having left-sided buttock and leg pain that shoots down into her leg with numbness in her posterior knee that is very classic for her previous sciatica episodes.  She states that she has them approximately once a year usually and switches sides from left to right but this time is on the left side.  She notes that she was then seen outpatient and given Robaxin, Percocet, and steroids and her symptoms improved, but on March 24 her symptoms recurred and she was seen in the emergency department where she was given the same regimen but this has not improved her pain for the second episode.  She states that she is unable to do any of her ADLs and her  typically finds her hunched over in pain.  She denies any red flag symptoms including fever, stool incontinence, urine continence, saddle anesthesia, or any other red flags.      MEDICAL DECISION MAKING / ASSESSMENT / PLAN     INITIAL VITALS: BP: (!) 133/90, Temp: 97.7 °F (36.5 °C), Pulse: 81, Respirations: 16, SpO2: 95 %    ED Course as of 03/29/25 2309   Sat Mar 29, 2025   2016 On initial evaluation, patient is uncomfortable appearing but hemodynamically stable.  Patient is presenting for left-sided buttock and leg pain that she describes as exactly similar to her previous sciatic flares and would like help with

## 2025-03-30 NOTE — ED PROVIDER NOTES
ED Attending Attestation Note     Date of evaluation: 3/29/2025    This patient was seen by the resident.  I have seen and examined the patient, agree with the workup, evaluation, management and diagnosis. The care plan has been discussed.  My assessment reveals an uncomfortable appearing 55 year old female with history of DDD, chronic lumbar pain, left sided sciatica presenting with left lower back pain. On exam she has left lumbar paraspinal muscle tenderness with positive straight leg raise. She has 4/5 strength to left hip flexion that is pain limited. She has 5/5 strength to left knee flexion and extension, 5/5 plantar/dorsiflexion of the ankle. She is ambulatory with her cane. No midline tenderness to the C/T/L spines. No urinary or fecal incontinence. Symptoms appear consistent with patient's known sciatica without additional red flag findings. Patient has had 2 courses of steroids in recent history and would likely not benefit from additional courses with risk for harm. Will provide referral to our spine surgeons for re-evaluation. Do not feel cross sectional imaging is necessary today given no traumatic mechanism, stable neurologic exam. Will treat with multimodal analgesia.        Eric Jimenes MD  03/29/25 [id number]

## 2025-04-01 ENCOUNTER — OFFICE VISIT (OUTPATIENT)
Dept: ORTHOPEDIC SURGERY | Age: 55
End: 2025-04-01
Payer: COMMERCIAL

## 2025-04-01 DIAGNOSIS — M79.605 PAIN OF LEFT LOWER EXTREMITY: Primary | ICD-10-CM

## 2025-04-01 DIAGNOSIS — M47.27 LUMBOSACRAL SPONDYLOSIS WITH RADICULOPATHY: ICD-10-CM

## 2025-04-01 PROCEDURE — 99203 OFFICE O/P NEW LOW 30 MIN: CPT | Performed by: PHYSICIAN ASSISTANT

## 2025-04-01 RX ORDER — GABAPENTIN 100 MG/1
100 CAPSULE ORAL 3 TIMES DAILY
Qty: 90 CAPSULE | Refills: 0 | Status: SHIPPED | OUTPATIENT
Start: 2025-04-01 | End: 2025-05-01

## 2025-04-02 NOTE — PROGRESS NOTES
the office today.  X-rays show significant degenerative disc disease L5-S1 greater than L4-5.  No spondylolisthesis.    Diagnosis:      ICD-10-CM    1. Pain of left lower extremity  M79.605 XR LUMBAR SPINE (2-3 VIEWS)      2. Lumbosacral spondylosis with radiculopathy  M47.27 [facility]     MRI LUMBAR SPINE WO CONTRAST           Assessment/ Plan:    Teodora is a 55-year-old female who has lumbar spondylosis with bilateral leg radiculopathy.  Symptoms are most likely related to her lumbar stenosis.  She reports significant pain, difficulty standing and walking, difficulty performing ADLs due to pain.    I had an extensive discussion with Ms. Teodora Glover and/or family regarding the natural history, etiology, and long term consequences of her condition.   I have presented reasonable alternatives to the patient's proposed care, treatment, and services.  Risks and benefits of the treatment options also reviewed in detail. I have outlined a treatment plan with them. She has had full opportunity to ask her questions.  I have answered them all to her satisfaction.  I feel that Ms. Teodora Glover understands our discussion today.     New Medications prescribed today:  Gabapentin 100 mg 3 times daily for radicular pain.  Tizanidine 4 mg every 6 hours as needed spasm.    Referred to outpatient physical therapy for lumbar exercises and modalities.    Further Imaging:  Recommend updated MRI lumbar spine to assess severity of canal and/or foraminal stenosis as well as levels of stenosis.  - Considering surgery or other therapeutic options such as epidural steroid injection, medial branch block injections or other interventional procedures.        Follow up:   In the office after MRI     She was instructed to call us emergently if she begins to experience bowel or bladder dysfunction, saddle anesthesia, increasing muscle weakness, or onset/ worsening leg symptoms.

## 2025-04-08 ENCOUNTER — HOSPITAL ENCOUNTER (OUTPATIENT)
Dept: PHYSICAL THERAPY | Age: 55
Setting detail: THERAPIES SERIES
Discharge: HOME | End: 2025-04-08
Payer: COMMERCIAL

## 2025-04-08 DIAGNOSIS — R53.1 WEAKNESS: Primary | ICD-10-CM

## 2025-04-08 DIAGNOSIS — M53.3 SACRAL DYSFUNCTION: ICD-10-CM

## 2025-04-08 DIAGNOSIS — M54.42 ACUTE LEFT-SIDED LOW BACK PAIN WITH LEFT-SIDED SCIATICA: ICD-10-CM

## 2025-04-08 DIAGNOSIS — M25.60 LIMITED JOINT RANGE OF MOTION (ROM): ICD-10-CM

## 2025-04-08 DIAGNOSIS — M62.89 MUSCLE TIGHTNESS: ICD-10-CM

## 2025-04-08 DIAGNOSIS — R29.3 POOR POSTURE: ICD-10-CM

## 2025-04-08 PROCEDURE — 97161 PT EVAL LOW COMPLEX 20 MIN: CPT

## 2025-04-08 PROCEDURE — 97110 THERAPEUTIC EXERCISES: CPT

## 2025-04-08 PROCEDURE — 97140 MANUAL THERAPY 1/> REGIONS: CPT

## 2025-04-09 ENCOUNTER — RESULTS FOLLOW-UP (OUTPATIENT)
Dept: ORTHOPEDIC SURGERY | Age: 55
End: 2025-04-09

## 2025-04-16 ENCOUNTER — APPOINTMENT (OUTPATIENT)
Dept: PHYSICAL THERAPY | Age: 55
End: 2025-04-16
Payer: COMMERCIAL

## 2025-04-17 ENCOUNTER — OFFICE VISIT (OUTPATIENT)
Dept: ORTHOPEDIC SURGERY | Age: 55
End: 2025-04-17

## 2025-04-17 VITALS — HEIGHT: 65 IN | WEIGHT: 116 LBS | BODY MASS INDEX: 19.33 KG/M2

## 2025-04-17 DIAGNOSIS — M47.27 LUMBOSACRAL SPONDYLOSIS WITH RADICULOPATHY: ICD-10-CM

## 2025-04-17 DIAGNOSIS — M48.061 LUMBAR FORAMINAL STENOSIS: Primary | ICD-10-CM

## 2025-04-18 ENCOUNTER — HOSPITAL ENCOUNTER (OUTPATIENT)
Dept: PHYSICAL THERAPY | Age: 55
Setting detail: THERAPIES SERIES
Discharge: HOME | End: 2025-04-18
Payer: COMMERCIAL

## 2025-04-18 PROCEDURE — 97110 THERAPEUTIC EXERCISES: CPT

## 2025-04-18 PROCEDURE — 97140 MANUAL THERAPY 1/> REGIONS: CPT

## 2025-04-18 NOTE — PLAN OF CARE
be achieved in: 2 weeks  1Independent in HEP and progression per patient tolerance, in order to prevent re-injury.   [] Progressing: [] Met: [] Not Met: [] Adjusted  Patient will have a decrease in pain to <3/10 to facilitate improvement in movement, function, and ADLs as indicated by Functional Deficits.  [] Progressing: [] Met: [] Not Met: [] Adjusted    Long Term Goals: To be achieved in: 8 weeks  Disability index score of 20% or less for the Modified Oswestry to assist with reaching prior level of function with activities such as tolerance of prolonged positioning and activity >1 hour.  [] Progressing: [] Met: [] Not Met: [] Adjusted  Patient will demonstrate increased AROM of lumbar spine and HS to WNL without pain to allow for proper joint functioning to enable patient to put on shoes/socks.   [] Progressing: [] Met: [] Not Met: [] Adjusted  Patient will demonstrate increased Strength of L LE to at least 4+/5 throughout without pain to allow for proper functional mobility to enable patient to return to squatting and lifting 10#.   [] Progressing: [] Met: [] Not Met: [] Adjusted  Patient will return to ADL's and household chores without increased symptoms or restriction.   [] Progressing: [] Met: [] Not Met: [] Adjusted  Patient will ambulate community distances on varied surfaces without AD, and up/down steps with an alt patten without rail independently and without deviations.    [] Progressing: [] Met: [] Not Met: [] Adjusted   Correct pelvic/sacral dysfunction  [] Progressing: [] Met: [] Not Met: [] Adjusted       Overall Progression Towards Functional goals/ Treatment Progress Update:  [] Patient is progressing as expected towards functional goals listed.    [] Progression is slowed due to complexities/Impairments listed.  [] Progression has been slowed due to co-morbidities.  [x] Plan just implemented, too soon (<30days) to assess goals progression   [] Goals require adjustment due to lack of progress  []

## 2025-04-19 NOTE — PROGRESS NOTES
Subjective:      Patient ID: Teodora Glover is a 55 y.o. female who is here for follow up evaluation of low back pain and left greater than right lower extremity radicular pain.  Symptoms have been the same.  Minimal to no improvement since last visit 4/1/2025 when she was prescribed gabapentin 100 mg 3 times daily, tizanidine 4 mg every 6 hours and physical therapy.  She is here today to review recent MRI of the lumbar spine.    HPI 4/1/2025:  Ms. eTodora Glover is a pleasant 55 y.o. female kindly referred by [facility] for consultation regarding her low back pain and left greater than right lower extremity radicular pain.   She has a history of chronic low back pain for the past 20 years after a lifting injury at work.  She states her symptoms tend to wax and wane.  She did have epidural steroid injections for spinal stenosis about 6 or 7 years ago which helped for only 18 hours.  This flareup of back pain and leg pain began 3 weeks ago after moving a heavy wooden toy box.   Pain has steadily worsened since onset.  She has been seen recently in the ER 2/13/2025, 3/24/2025 and 3/29/2025.  She has done several rounds of steroids and muscle relaxers with minimal relief.  Back pain 7/10 VAS, right and left buttock pain 8/10 VAS, right and left leg pain 8/10 VAS.   Pain is describes as sharp pain.   She reports numbness and tingling bilateral lower extremities.    She reports weakness of her right and left leg.  She denies bowel or bladder dysfunction and saddle anesthesia.   She can sit for a maximum of 10 minutes and stand for a maximum 10 minutes.   Pain does disrupt her sleep.        Review Of Systems:   Significant for back pain and negative for recent weight loss, fatigue, chills, visual disturbances, blood in stool or urine, recent infection.        Past Medical History:   Diagnosis Date    Alcohol abuse 2014    DDD (degenerative disc disease), lumbosacral     Depression     Gastritis     Heart murmur

## 2025-04-23 ENCOUNTER — HOSPITAL ENCOUNTER (OUTPATIENT)
Dept: PHYSICAL THERAPY | Age: 55
Setting detail: THERAPIES SERIES
Discharge: HOME | End: 2025-04-23
Payer: COMMERCIAL

## 2025-04-23 PROCEDURE — 97110 THERAPEUTIC EXERCISES: CPT

## 2025-04-23 PROCEDURE — 97140 MANUAL THERAPY 1/> REGIONS: CPT

## 2025-04-23 NOTE — FLOWSHEET NOTE
[facility] - Outpatient Rehabilitation and Therapy: [facility] [address], [place] [address] office: [phone] fax: [phone]         Physical Therapy: TREATMENT/PROGRESS NOTE   Patient: Teodora Glover (55 y.o. female)  \"Maru\" Examination Date: 2025   :  1970 MRN: [id number]   Visit #: 3   Insurance Allowable Auth Needed   Med nec []Yes    [x]No    Insurance: Payor: [employer] / Plan: [employer]ANS / Product Type: *No Product type* /   Insurance ID: [id number] - (Medicare Managed)  Secondary Insurance (if applicable): MEDICAID [place]   Treatment Diagnosis:     ICD-10-CM    1. Weakness  R53.1       2. Poor posture  R29.3       3. Limited joint range of motion (ROM)  M25.60       4. Muscle tightness  M62.89       5. Sacral dysfunction  M53.3       6. Acute left-sided low back pain with left-sided sciatica  M54.42          Medical Diagnosis:  Lumbosacral spondylosis with radiculopathy [M47.27]   Referring Physician: Chaitanya Chambers PA  PCP: No, Pcp     Plan of care signed (Y/N): yes, 25    Date of Patient follow up with Physician:  LAUREN     Plan of Care Report: NO  POC update due: (10 visits /OR AUTH LIMITS, whichever is less)  2025                                             Medical History:  Comorbidities:  Stroke/TIA  Osteoarthritis  Anxiety  Depression  Relevant Medical History: DDD lumbar spine, alcohol abuse- clean for the past year, heart murmur, heart palpitations, migraines, botox injections, sciatic nerve disease, subarachnoid hemorrhage,  smokes 1ppd, brain surgery to relieve blood from hemorrhage, choley, endometrial ablation, tonsillectomy, tubal, epidurals at cervical and lumbar, ADHD                                         Precautions/ Contra-indications:           Latex allergy:  NO  Pacemaker:    NO  Contraindications for Manipulation: None    Red Flags:  Unexplained weightloss  180# -116# over the past year.  MD aware and things it is because

## 2025-04-25 ENCOUNTER — HOSPITAL ENCOUNTER (OUTPATIENT)
Dept: PHYSICAL THERAPY | Age: 55
Setting detail: THERAPIES SERIES
Discharge: HOME | End: 2025-04-25
Payer: COMMERCIAL

## 2025-04-25 PROCEDURE — 97140 MANUAL THERAPY 1/> REGIONS: CPT

## 2025-04-25 PROCEDURE — 97110 THERAPEUTIC EXERCISES: CPT

## 2025-04-25 NOTE — FLOWSHEET NOTE
[facility] - Outpatient Rehabilitation and Therapy: [facility] [address], [place] [address] office: [phone] fax: [phone]         Physical Therapy: TREATMENT/PROGRESS NOTE   Patient: Teodora Glover (55 y.o. female)  \"Maru\" Examination Date: 2025   :  1970 MRN: [id number]   Visit #: 4   Insurance Allowable Auth Needed   Med nec []Yes    [x]No    Insurance: Payor: [employer] / Plan: [employer]ANS / Product Type: *No Product type* /   Insurance ID: [id number] - (Medicare Managed)  Secondary Insurance (if applicable): MEDICAID [place]   Treatment Diagnosis:     ICD-10-CM    1. Weakness  R53.1       2. Poor posture  R29.3       3. Limited joint range of motion (ROM)  M25.60       4. Muscle tightness  M62.89       5. Sacral dysfunction  M53.3       6. Acute left-sided low back pain with left-sided sciatica  M54.42          Medical Diagnosis:  Lumbosacral spondylosis with radiculopathy [M47.27]   Referring Physician: Chaitanya Chambers PA  PCP: No, Pcp     Plan of care signed (Y/N): yes, 25    Date of Patient follow up with Physician:  LAUREN     Plan of Care Report: NO  POC update due: (10 visits /OR AUTH LIMITS, whichever is less)  2025                                             Medical History:  Comorbidities:  Stroke/TIA  Osteoarthritis  Anxiety  Depression  Relevant Medical History: DDD lumbar spine, alcohol abuse- clean for the past year, heart murmur, heart palpitations, migraines, botox injections, sciatic nerve disease, subarachnoid hemorrhage,  smokes 1ppd, brain surgery to relieve blood from hemorrhage, choley, endometrial ablation, tonsillectomy, tubal, epidurals at cervical and lumbar, ADHD                                         Precautions/ Contra-indications:           Latex allergy:  NO  Pacemaker:    NO  Contraindications for Manipulation: None    Red Flags:  Unexplained weightloss  180# -116# over the past year.  MD aware and things it is because

## 2025-04-28 RX ORDER — GABAPENTIN 100 MG/1
100 CAPSULE ORAL 3 TIMES DAILY
Qty: 90 CAPSULE | Refills: 2 | Status: SHIPPED | OUTPATIENT
Start: 2025-04-28 | End: 2025-07-27

## 2025-04-30 ENCOUNTER — HOSPITAL ENCOUNTER (OUTPATIENT)
Dept: PHYSICAL THERAPY | Age: 55
Setting detail: THERAPIES SERIES
Discharge: HOME | End: 2025-04-30
Payer: COMMERCIAL

## 2025-04-30 PROCEDURE — 97140 MANUAL THERAPY 1/> REGIONS: CPT

## 2025-04-30 PROCEDURE — 97110 THERAPEUTIC EXERCISES: CPT

## 2025-04-30 NOTE — FLOWSHEET NOTE
[facility] - Outpatient Rehabilitation and Therapy: [facility] [address], [place] [address] office: [phone] fax: [phone]         Physical Therapy: TREATMENT/PROGRESS NOTE   Patient: Teodora Glover (55 y.o. female)  \"Maru\" Examination Date: 2025   :  1970 MRN: [id number]   Visit #: 5   Insurance Allowable Auth Needed   Med nec []Yes    [x]No    Insurance: Payor: [employer]AN / Plan: [employer] PLANS / Product Type: *No Product type* /   Insurance ID: [id number] - (Medicare Managed)  Secondary Insurance (if applicable): [place]ICAID [place]   Treatment Diagnosis:     ICD-10-CM    1. Weakness  R53.1       2. Poor posture  R29.3       3. Limited joint range of motion (ROM)  M25.60       4. Muscle tightness  M62.89       5. Sacral dysfunction  M53.3       6. Acute left-sided low back pain with left-sided sciatica  M54.42          Medical Diagnosis:  Lumbosacral spondylosis with radiculopathy [M47.27]   Referring Physician: Chaitanya Chambers PA  PCP: No, Pcp     Plan of care signed (Y/N): yes, 25    Date of Patient follow up with Physician:  LAUREN     Plan of Care Report: NO  POC update due: (10 visits /OR AUTH LIMITS, whichever is less)  2025                                             Medical History:  Comorbidities:  Stroke/TIA  Osteoarthritis  Anxiety  Depression  Relevant Medical History: DDD lumbar spine, alcohol abuse- clean for the past year, heart murmur, heart palpitations, migraines, botox injections, sciatic nerve disease, subarachnoid hemorrhage,  smokes 1ppd, brain surgery to relieve blood from hemorrhage, choley, endometrial ablation, tonsillectomy, tubal, epidurals at cervical and lumbar, ADHD                                         Precautions/ Contra-indications:           Latex allergy:  NO  Pacemaker:    NO  Contraindications for Manipulation: None    Red Flags:  Unexplained weightloss  180# -116# over the past year.  MD aware and things it is because

## 2025-06-12 ENCOUNTER — OFFICE VISIT (OUTPATIENT)
Dept: NEUROLOGY | Age: 55
End: 2025-06-12
Payer: MEDICARE

## 2025-06-12 VITALS
SYSTOLIC BLOOD PRESSURE: 122 MMHG | RESPIRATION RATE: 16 BRPM | HEIGHT: 65 IN | DIASTOLIC BLOOD PRESSURE: 70 MMHG | WEIGHT: 116 LBS | HEART RATE: 62 BPM | OXYGEN SATURATION: 98 % | BODY MASS INDEX: 19.33 KG/M2

## 2025-06-12 DIAGNOSIS — G43.E19 INTRACTABLE CHRONIC MIGRAINE WITH AURA AND WITHOUT STATUS MIGRAINOSUS: Primary | ICD-10-CM

## 2025-06-12 DIAGNOSIS — M79.7 FIBROMYALGIA: ICD-10-CM

## 2025-06-12 PROCEDURE — 3017F COLORECTAL CA SCREEN DOC REV: CPT | Performed by: STUDENT IN AN ORGANIZED HEALTH CARE EDUCATION/TRAINING PROGRAM

## 2025-06-12 PROCEDURE — G8427 DOCREV CUR MEDS BY ELIG CLIN: HCPCS | Performed by: STUDENT IN AN ORGANIZED HEALTH CARE EDUCATION/TRAINING PROGRAM

## 2025-06-12 PROCEDURE — G8420 CALC BMI NORM PARAMETERS: HCPCS | Performed by: STUDENT IN AN ORGANIZED HEALTH CARE EDUCATION/TRAINING PROGRAM

## 2025-06-12 PROCEDURE — 4004F PT TOBACCO SCREEN RCVD TLK: CPT | Performed by: STUDENT IN AN ORGANIZED HEALTH CARE EDUCATION/TRAINING PROGRAM

## 2025-06-12 PROCEDURE — 99214 OFFICE O/P EST MOD 30 MIN: CPT | Performed by: STUDENT IN AN ORGANIZED HEALTH CARE EDUCATION/TRAINING PROGRAM

## 2025-06-12 PROCEDURE — 64615 CHEMODENERV MUSC MIGRAINE: CPT | Performed by: STUDENT IN AN ORGANIZED HEALTH CARE EDUCATION/TRAINING PROGRAM

## 2025-06-12 RX ORDER — ERENUMAB-AOOE 140 MG/ML
140 INJECTION, SOLUTION SUBCUTANEOUS
Qty: 1 ADJUSTABLE DOSE PRE-FILLED PEN SYRINGE | Refills: 11 | Status: SHIPPED | OUTPATIENT
Start: 2025-06-12 | End: 2026-06-12

## 2025-06-12 NOTE — PROGRESS NOTES
Botox 200 units    Lot # [id number]  Exp date 10/31/2027  NDC  6775-2799-79    Patient supplied  
Inhale 2 puffs into the lungs every 4 hours as needed for Wheezing or Shortness of Breath 18 g 0    BOTOX 200 units injection INJECT 155 UNITS INTRAMUSCULARLY EVERY 3 MONTHS 1 each 3    fluticasone (FLONASE) 50 MCG/ACT nasal spray 2 sprays by Each Nostril route daily 16 g 0    Cholecalciferol (VITAMIN D3) 125 MCG (5000 UT) TABS Take 1 tablet by mouth daily      aspirin 325 MG tablet Take 1 tablet by mouth daily      magnesium 200 MG TABS tablet Take 1 tablet by mouth daily      loratadine (CLARITIN) 10 MG tablet Take 1 tablet by mouth daily      Multiple Vitamins-Minerals (VITAMIN D3 COMPLETE PO) Take by mouth daily      Omega-3 Fatty Acids (FISH OIL PO) Take by mouth      tiZANidine (ZANAFLEX) 4 MG tablet Take 1 tablet by mouth 4 times daily as needed (spasm) (Patient not taking: Reported on 6/12/2025) 60 tablet 0    butalbital-APAP-caffeine -40 MG CAPS per capsule Take 1 capsule by mouth every 6 hours as needed for Headaches TAKE 1 CAPSULE BY MOUTH EVERY 6 HOURS AS NEEDED FOR HEADACHE Max Daily Amount: 4 capsules (Patient not taking: Reported on 6/12/2025) 9 capsule 5    [DISCONTINUED] Erenumab-aooe (AIMOVIG) 70 MG/ML SOAJ Inject 70 mg into the skin every 30 days 1 Adjustable Dose Pre-filled Pen Syringe 11    [DISCONTINUED] Hypertonic Nasal Wash (SINUS RINSE) PACK 1 kit by Nasal route daily (Patient not taking: Reported on 3/12/2025) 1 kit 0    [DISCONTINUED] oxybutynin (DITROPAN) 5 MG tablet Take 1 tablet by mouth daily. 30 tablet 5     No facility-administered encounter medications on file as of 6/12/2025.        The procedure was tolerated well with no complications  
Nerves  CN III, IV, VI: Normal lids and orbits bilaterally.  CN VII:  Right: There is no facial weakness.  Left: There is no facial weakness.  CN XI:  Right: Sternocleidomastoid strength is normal. Trapezius strength is normal.  Left: Sternocleidomastoid strength is normal. Trapezius strength is normal.       Results      Assessment & Plan  1. Intractable migraine with aura without status migrainosus  Her migraines remain poorly controlled. Migraine frequency remains at least 15 days per mo. However, this is a significant improvement since starting Botox injections. Addition of Aimovig 70mg/d provided improvement for the first month but then headaches returned to previous frequency. Verapamil is prescribed for fibromyalgia (ineffective for headache at current dose). Current abortive medications are effective. She has a contraindication to triptans namely history of subarachnoid hemorrhage. Failed treatment trials = duloxetine, Lyrica, Tegretol, amitriptyline.     I performed Botox injections today. No change to dose/pattern. See procedure note for details.   Increase Aimovig from 70mg/mo to 140mg/mo  Ubrelvy prn = rescue plan A  Fioricet prn = rescue plan B    2. Fibromyalgia  Chest pain improved with verapamil.     Verapamil 120mg QHS    Follow up in 3mo.    The patient (or guardian, if applicable) and other individuals in attendance with the patient were advised that Artificial Intelligence will be utilized during this visit to record, process the conversation to generate a clinical note, and support improvement of the AI technology. The patient (or guardian, if applicable) and other individuals in attendance at the appointment consented to the use of AI, including the recording.

## 2025-06-12 NOTE — PATIENT INSTRUCTIONS

## 2025-07-10 ENCOUNTER — TELEPHONE (OUTPATIENT)
Dept: ADMINISTRATIVE | Age: 55
End: 2025-07-10

## 2025-07-10 NOTE — TELEPHONE ENCOUNTER
The medication is APPROVED 7/10/25.    Authorization Expiration Date: 12/31/25.    If this requires a response please respond to the pool (P [facility] MEDICATION PRE-AUTH).      Thank you, please advise patient.

## 2025-07-10 NOTE — TELEPHONE ENCOUNTER
Submitted PA for Aimovig Via [facility] Key: [id number] STATUS: PENDING.    Follow up done daily; if no decision with in three days we will refax.  If another three days goes by with no decision will call the insurance for status.

## 2025-07-22 DIAGNOSIS — G43.901 STATUS MIGRAINOSUS: Primary | ICD-10-CM

## 2025-07-22 RX ORDER — DIVALPROEX SODIUM 250 MG/1
250 TABLET, DELAYED RELEASE ORAL 2 TIMES DAILY
Qty: 8 TABLET | Refills: 0 | Status: SHIPPED | OUTPATIENT
Start: 2025-07-22 | End: 2025-07-26

## 2025-07-22 NOTE — PROGRESS NOTES
Spoke with patient, she is aware of Dr Walden's recommendations.     She voiced understanding.     Office Instructed her to upload copy of new insurance, she said that she would bring in a copy.

## 2025-07-22 NOTE — PROGRESS NOTES
There has been a delay in obtaining Ubrelvy due to prior authorization issues.  She appears to be having status migrainosus.  Unresponsive to Fioricet.    Recommendations:  1.  Start Depakote 250 mg twice daily for 4 days while waiting for Ubrelvy prior authorization.  2.  Avoid taking Fioricet while taking Depakote.

## 2025-07-24 ENCOUNTER — TELEPHONE (OUTPATIENT)
Dept: ORTHOPEDIC SURGERY | Age: 55
End: 2025-07-24

## 2025-07-24 ENCOUNTER — TELEPHONE (OUTPATIENT)
Dept: NEUROLOGY | Age: 55
End: 2025-07-24

## 2025-07-24 NOTE — TELEPHONE ENCOUNTER
----- Message from Glendy VALENCIA sent at 7/24/2025 10:54 AM EDT -----  Regarding: Specialty Message to Provider  Specialty Message to Provider    Relationship to Patient: Covered Entity [facility]     Patient Message: [facility] is calling because they are needing any OV notes for this patient faxed over as she is trying to get into pain management. Please fax it to [phone]  attn: Dr. Khan  --------------------------------------------------------------------------------------------------------------------------    Call Back Information: Do not leave any message, patient will call back for answer  Preferred Call Back Number: Phone [phone]

## 2025-07-24 NOTE — TELEPHONE ENCOUNTER
Patient needs a pa done for Ubrelvy as she has new insurance.     New insurance is scanned into media    Please review

## 2025-07-24 NOTE — TELEPHONE ENCOUNTER
The medication is APPROVED 7/24/25.    Authorization Expiration Date: 12/31/25.    If this requires a response please respond to the pool (P MHCX PSC MEDICATION PRE-AUTH).      Thank you, please advise patient.

## 2025-07-24 NOTE — TELEPHONE ENCOUNTER
Submitted PA for Ubrelvy Via [facility] Key: [id number] STATUS: PENDING.    Follow up done daily; if no decision with in three days we will refax.  If another three days goes by with no decision will call the insurance for status.

## 2025-08-27 ENCOUNTER — TELEPHONE (OUTPATIENT)
Dept: NEUROLOGY | Age: 55
End: 2025-08-27

## 2025-08-27 DIAGNOSIS — G43.119 INTRACTABLE MIGRAINE WITH AURA WITHOUT STATUS MIGRAINOSUS: ICD-10-CM

## 2025-08-28 ENCOUNTER — TRANSCRIBE ORDERS (OUTPATIENT)
Dept: ADMINISTRATIVE | Age: 55
End: 2025-08-28

## 2025-08-28 DIAGNOSIS — Z12.31 ENCOUNTER FOR SCREENING MAMMOGRAM FOR MALIGNANT NEOPLASM OF BREAST: Primary | ICD-10-CM
